# Patient Record
Sex: FEMALE | Race: WHITE | NOT HISPANIC OR LATINO | Employment: OTHER | URBAN - METROPOLITAN AREA
[De-identification: names, ages, dates, MRNs, and addresses within clinical notes are randomized per-mention and may not be internally consistent; named-entity substitution may affect disease eponyms.]

---

## 2023-03-23 PROBLEM — D64.9 ANEMIA: Status: ACTIVE | Noted: 2023-03-23

## 2023-03-23 PROBLEM — E78.00 HYPERCHOLESTEREMIA: Status: ACTIVE | Noted: 2023-03-23

## 2023-03-23 PROBLEM — K21.9 ACID REFLUX: Status: ACTIVE | Noted: 2023-03-23

## 2023-03-23 PROBLEM — F33.9 MAJOR DEPRESSION, RECURRENT (CMS-HCC): Status: ACTIVE | Noted: 2023-03-23

## 2023-03-23 PROBLEM — I10 HYPERTENSION: Status: ACTIVE | Noted: 2023-03-23

## 2023-03-23 PROBLEM — G47.30 SEVERE SLEEP APNEA: Status: ACTIVE | Noted: 2023-03-23

## 2023-03-23 PROBLEM — M85.80 OSTEOPENIA: Status: ACTIVE | Noted: 2023-03-23

## 2023-03-23 PROBLEM — E11.22: Status: ACTIVE | Noted: 2023-03-23

## 2023-03-23 PROBLEM — N18.4 STAGE 4 CHRONIC KIDNEY DISEASE (MULTI): Status: ACTIVE | Noted: 2023-03-23

## 2023-03-23 PROBLEM — E66.812 CLASS 2 SEVERE OBESITY WITH SERIOUS COMORBIDITY AND BODY MASS INDEX (BMI) OF 38.0 TO 38.9 IN ADULT: Status: ACTIVE | Noted: 2023-03-23

## 2023-03-23 PROBLEM — E83.42 HYPOMAGNESEMIA: Status: ACTIVE | Noted: 2023-03-23

## 2023-03-23 PROBLEM — E66.01 CLASS 2 SEVERE OBESITY WITH SERIOUS COMORBIDITY AND BODY MASS INDEX (BMI) OF 38.0 TO 38.9 IN ADULT (MULTI): Status: ACTIVE | Noted: 2023-03-23

## 2023-03-23 RX ORDER — MULTIVITAMIN
TABLET ORAL
COMMUNITY

## 2023-03-23 RX ORDER — NIFEDIPINE 60 MG/1
1 TABLET, FILM COATED, EXTENDED RELEASE ORAL DAILY
COMMUNITY
Start: 2021-09-14 | End: 2024-03-20 | Stop reason: SDUPTHER

## 2023-03-23 RX ORDER — LANOLIN ALCOHOL/MO/W.PET/CERES
1 CREAM (GRAM) TOPICAL DAILY
COMMUNITY

## 2023-03-23 RX ORDER — SERTRALINE HYDROCHLORIDE 50 MG/1
1 TABLET, FILM COATED ORAL DAILY
COMMUNITY
Start: 2020-12-07 | End: 2023-04-11

## 2023-03-23 RX ORDER — OMEPRAZOLE 40 MG/1
1 CAPSULE, DELAYED RELEASE ORAL 2 TIMES DAILY
COMMUNITY
Start: 2020-12-07 | End: 2023-04-11

## 2023-03-23 RX ORDER — LANCETS
EACH MISCELLANEOUS DAILY
COMMUNITY
End: 2024-01-02

## 2023-03-23 RX ORDER — LISINOPRIL 40 MG/1
1 TABLET ORAL DAILY
COMMUNITY
Start: 2021-04-22 | End: 2023-08-17 | Stop reason: SDUPTHER

## 2023-03-23 RX ORDER — ASPIRIN 81 MG/1
1 TABLET ORAL DAILY
COMMUNITY
End: 2024-02-15 | Stop reason: ALTCHOICE

## 2023-03-23 RX ORDER — PRAVASTATIN SODIUM 20 MG/1
20 TABLET ORAL DAILY
COMMUNITY
End: 2023-05-18

## 2023-03-23 RX ORDER — IBUPROFEN 200 MG
CAPSULE ORAL
COMMUNITY
End: 2024-01-02

## 2023-03-24 ENCOUNTER — LAB (OUTPATIENT)
Dept: LAB | Facility: LAB | Age: 78
End: 2023-03-24
Payer: MEDICARE

## 2023-03-24 ENCOUNTER — TELEPHONE (OUTPATIENT)
Dept: PRIMARY CARE | Facility: CLINIC | Age: 78
End: 2023-03-24

## 2023-03-24 ENCOUNTER — OFFICE VISIT (OUTPATIENT)
Dept: PRIMARY CARE | Facility: CLINIC | Age: 78
End: 2023-03-24
Payer: MEDICARE

## 2023-03-24 VITALS
SYSTOLIC BLOOD PRESSURE: 130 MMHG | BODY MASS INDEX: 37.38 KG/M2 | HEART RATE: 72 BPM | WEIGHT: 198 LBS | DIASTOLIC BLOOD PRESSURE: 80 MMHG | HEIGHT: 61 IN

## 2023-03-24 DIAGNOSIS — R82.90 ABNORMAL FINDING ON URINALYSIS: Primary | ICD-10-CM

## 2023-03-24 DIAGNOSIS — R10.84 GENERALIZED ABDOMINAL PAIN: ICD-10-CM

## 2023-03-24 DIAGNOSIS — N18.4 TYPE 2 DIABETES MELLITUS WITH STAGE 4 CHRONIC KIDNEY DISEASE, WITHOUT LONG-TERM CURRENT USE OF INSULIN (MULTI): ICD-10-CM

## 2023-03-24 DIAGNOSIS — F33.0 MILD EPISODE OF RECURRENT MAJOR DEPRESSIVE DISORDER (CMS-HCC): ICD-10-CM

## 2023-03-24 DIAGNOSIS — K59.09 OTHER CONSTIPATION: ICD-10-CM

## 2023-03-24 DIAGNOSIS — E11.22 TYPE 2 DIABETES MELLITUS WITH STAGE 4 CHRONIC KIDNEY DISEASE, WITHOUT LONG-TERM CURRENT USE OF INSULIN (MULTI): ICD-10-CM

## 2023-03-24 DIAGNOSIS — E66.1 CLASS 2 DRUG-INDUCED OBESITY WITH SERIOUS COMORBIDITY AND BODY MASS INDEX (BMI) OF 37.0 TO 37.9 IN ADULT: Primary | ICD-10-CM

## 2023-03-24 DIAGNOSIS — N18.4 STAGE 4 CHRONIC KIDNEY DISEASE (MULTI): ICD-10-CM

## 2023-03-24 PROBLEM — E66.812 CLASS 2 SEVERE OBESITY WITH SERIOUS COMORBIDITY AND BODY MASS INDEX (BMI) OF 38.0 TO 38.9 IN ADULT: Status: RESOLVED | Noted: 2023-03-23 | Resolved: 2023-03-24

## 2023-03-24 PROBLEM — E66.01 CLASS 2 SEVERE OBESITY WITH SERIOUS COMORBIDITY AND BODY MASS INDEX (BMI) OF 38.0 TO 38.9 IN ADULT (MULTI): Status: RESOLVED | Noted: 2023-03-23 | Resolved: 2023-03-24

## 2023-03-24 LAB
ALANINE AMINOTRANSFERASE (SGPT) (U/L) IN SER/PLAS: 9 U/L (ref 7–45)
ALBUMIN (G/DL) IN SER/PLAS: 4.3 G/DL (ref 3.4–5)
ALKALINE PHOSPHATASE (U/L) IN SER/PLAS: 84 U/L (ref 33–136)
ANION GAP IN SER/PLAS: 14 MMOL/L (ref 10–20)
ASPARTATE AMINOTRANSFERASE (SGOT) (U/L) IN SER/PLAS: 15 U/L (ref 9–39)
BACTERIA, URINE: ABNORMAL /HPF
BASOPHILS (10*3/UL) IN BLOOD BY AUTOMATED COUNT: 0.05 X10E9/L (ref 0–0.1)
BASOPHILS/100 LEUKOCYTES IN BLOOD BY AUTOMATED COUNT: 0.4 % (ref 0–2)
BILIRUBIN TOTAL (MG/DL) IN SER/PLAS: 0.7 MG/DL (ref 0–1.2)
CALCIUM (MG/DL) IN SER/PLAS: 9.3 MG/DL (ref 8.6–10.3)
CARBON DIOXIDE, TOTAL (MMOL/L) IN SER/PLAS: 24 MMOL/L (ref 21–32)
CHLORIDE (MMOL/L) IN SER/PLAS: 104 MMOL/L (ref 98–107)
CREATININE (MG/DL) IN SER/PLAS: 1.6 MG/DL (ref 0.5–1.05)
EOSINOPHILS (10*3/UL) IN BLOOD BY AUTOMATED COUNT: 0.13 X10E9/L (ref 0–0.4)
EOSINOPHILS/100 LEUKOCYTES IN BLOOD BY AUTOMATED COUNT: 1 % (ref 0–6)
ERYTHROCYTE DISTRIBUTION WIDTH (RATIO) BY AUTOMATED COUNT: 13.7 % (ref 11.5–14.5)
ERYTHROCYTE MEAN CORPUSCULAR HEMOGLOBIN CONCENTRATION (G/DL) BY AUTOMATED: 31.2 G/DL (ref 32–36)
ERYTHROCYTE MEAN CORPUSCULAR VOLUME (FL) BY AUTOMATED COUNT: 91 FL (ref 80–100)
ERYTHROCYTES (10*6/UL) IN BLOOD BY AUTOMATED COUNT: 4.18 X10E12/L (ref 4–5.2)
GFR FEMALE: 33 ML/MIN/1.73M2
GLUCOSE (MG/DL) IN SER/PLAS: 110 MG/DL (ref 74–99)
HEMATOCRIT (%) IN BLOOD BY AUTOMATED COUNT: 38.2 % (ref 36–46)
HEMOGLOBIN (G/DL) IN BLOOD: 11.9 G/DL (ref 12–16)
IMMATURE GRANULOCYTES/100 LEUKOCYTES IN BLOOD BY AUTOMATED COUNT: 0.5 % (ref 0–0.9)
LEUKOCYTES (10*3/UL) IN BLOOD BY AUTOMATED COUNT: 13.1 X10E9/L (ref 4.4–11.3)
LYMPHOCYTES (10*3/UL) IN BLOOD BY AUTOMATED COUNT: 2.17 X10E9/L (ref 0.8–3)
LYMPHOCYTES/100 LEUKOCYTES IN BLOOD BY AUTOMATED COUNT: 16.5 % (ref 13–44)
MAGNESIUM (MG/DL) IN SER/PLAS: 1.85 MG/DL (ref 1.6–2.4)
MONOCYTES (10*3/UL) IN BLOOD BY AUTOMATED COUNT: 1.3 X10E9/L (ref 0.05–0.8)
MONOCYTES/100 LEUKOCYTES IN BLOOD BY AUTOMATED COUNT: 9.9 % (ref 2–10)
MUCUS, URINE: ABNORMAL /LPF
NEUTROPHILS (10*3/UL) IN BLOOD BY AUTOMATED COUNT: 9.41 X10E9/L (ref 1.6–5.5)
NEUTROPHILS/100 LEUKOCYTES IN BLOOD BY AUTOMATED COUNT: 71.7 % (ref 40–80)
PLATELETS (10*3/UL) IN BLOOD AUTOMATED COUNT: 396 X10E9/L (ref 150–450)
POTASSIUM (MMOL/L) IN SER/PLAS: 4.3 MMOL/L (ref 3.5–5.3)
PROTEIN TOTAL: 6.7 G/DL (ref 6.4–8.2)
RBC, URINE: 3 /HPF (ref 0–5)
SODIUM (MMOL/L) IN SER/PLAS: 138 MMOL/L (ref 136–145)
SQUAMOUS EPITHELIAL CELLS, URINE: 2 /HPF
UREA NITROGEN (MG/DL) IN SER/PLAS: 24 MG/DL (ref 6–23)
WBC, URINE: 15 /HPF (ref 0–5)

## 2023-03-24 PROCEDURE — 36415 COLL VENOUS BLD VENIPUNCTURE: CPT

## 2023-03-24 PROCEDURE — 1159F MED LIST DOCD IN RCRD: CPT | Performed by: FAMILY MEDICINE

## 2023-03-24 PROCEDURE — 1036F TOBACCO NON-USER: CPT | Performed by: FAMILY MEDICINE

## 2023-03-24 PROCEDURE — 81001 URINALYSIS AUTO W/SCOPE: CPT

## 2023-03-24 PROCEDURE — 99214 OFFICE O/P EST MOD 30 MIN: CPT | Performed by: FAMILY MEDICINE

## 2023-03-24 PROCEDURE — 80053 COMPREHEN METABOLIC PANEL: CPT

## 2023-03-24 PROCEDURE — 3075F SYST BP GE 130 - 139MM HG: CPT | Performed by: FAMILY MEDICINE

## 2023-03-24 PROCEDURE — 83735 ASSAY OF MAGNESIUM: CPT

## 2023-03-24 PROCEDURE — 85025 COMPLETE CBC W/AUTO DIFF WBC: CPT

## 2023-03-24 PROCEDURE — 3079F DIAST BP 80-89 MM HG: CPT | Performed by: FAMILY MEDICINE

## 2023-03-24 PROCEDURE — 1160F RVW MEDS BY RX/DR IN RCRD: CPT | Performed by: FAMILY MEDICINE

## 2023-03-24 ASSESSMENT — ENCOUNTER SYMPTOMS
CONSTIPATION: 1
ABDOMINAL PAIN: 1

## 2023-03-24 NOTE — TELEPHONE ENCOUNTER
----- Message from Lindsey Engladn MD sent at 3/24/2023  1:13 PM EDT -----  Can you please see if lab can add a urine culture tp labs done this morning, I'll put the order in

## 2023-03-24 NOTE — PROGRESS NOTES
Subjective  Georgie Friedman is a 77 y.o. female who presents for Abdominal Pain, Constipation, and Chills.  Abdominal Pain  Associated symptoms include constipation.   Constipation  Associated symptoms include abdominal pain.     Pt presents with about two weeks of abdominal cramping, and at first diarrhea.   The diarrhea stopped after a few days, and then became constipated.   Using otc laxatives did help.   Passing gas.  Last BM was two days ago.  Having abdominal pain,, but has improved.  Pain is diffuse.  Appetite was diminished.  Has not eaten today.  She is mildly nauseous.   No blood in stool, no dark tarry stool.    History of diverticulitis, 2017 was hospitalized two weeks.  History of hypomagnesium, she had at some point stopped taking magnesium awhile ago.   No fevers, has had chills.  Miralax and dulcolax helped, she hasn't taken any for two days.   Pleasant.  Poor historian .  Has apt with GI.  Reviewed two pages she brought with her concerns and what she has tried, will scan into record.  BMI 37, history of stage 4 kidney disease, diabetes, have been stable.  Review of Systems   Gastrointestinal:  Positive for abdominal pain and constipation.   All other systems reviewed and are negative.  .    Objective     Visit Vitals  /80 (BP Location: Left arm, Patient Position: Sitting)   Pulse 72      Physical Exam  Vitals and nursing note reviewed.   Constitutional:       General: She is not in acute distress.     Appearance: Normal appearance. She is not toxic-appearing.   HENT:      Head: Normocephalic and atraumatic.   Eyes:      General: No scleral icterus.  Cardiovascular:      Rate and Rhythm: Normal rate and regular rhythm.      Heart sounds: No murmur heard.  Pulmonary:      Effort: Pulmonary effort is normal.      Breath sounds: Normal breath sounds.   Abdominal:      General: There is no distension.      Palpations: Abdomen is soft. There is no mass.      Tenderness: There is no  abdominal tenderness. There is no guarding or rebound.   Musculoskeletal:      Cervical back: Neck supple. No rigidity.      Comments:     Skin:     General: Skin is warm and dry.   Neurological:      General: No focal deficit present.      Mental Status: She is alert and oriented to person, place, and time.   Psychiatric:         Mood and Affect: Mood normal.         Behavior: Behavior normal.         Assessment/Plan   Problem List Items Addressed This Visit          Genitourinary    Stage 4 chronic kidney disease (CMS/HCC)       Endocrine/Metabolic    Diabetes mellitus with chronic kidney disease, without long-term current use of insulin (CMS/HCC)       Other    Major depression, recurrent (CMS/HCC)     Other Visit Diagnoses       Class 2 drug-induced obesity with serious comorbidity and body mass index (BMI) of 37.0 to 37.9 in adult    -  Primary    Generalized abdominal pain        Relevant Orders    XR abdomen 2 views w chest 1 view    CBC and Auto Differential    Comprehensive Metabolic Panel    Magnesium    Urinalysis Microscopic Only    Other constipation                       Lindsey England MD

## 2023-03-26 LAB — URINE CULTURE: NORMAL

## 2023-03-28 ENCOUNTER — TELEPHONE (OUTPATIENT)
Dept: PRIMARY CARE | Facility: CLINIC | Age: 78
End: 2023-03-28
Payer: MEDICARE

## 2023-03-28 NOTE — RESULT ENCOUNTER NOTE
Final urine culture was negative, if her symptoms do not resolve, recommend she make a followup appt for further evaluation.

## 2023-03-28 NOTE — TELEPHONE ENCOUNTER
----- Message from Lindsey England MD sent at 3/28/2023  7:51 AM EDT -----  Final urine culture was negative, if her symptoms do not resolve, recommend she make a followup appt for further evaluation.

## 2023-03-28 NOTE — TELEPHONE ENCOUNTER
"States she is feeling better but still not good. Still having RT ABD pain; states feels like her \"insides are swollen\". States [facility] told her that her WBC was elevated which meant she had some kind of infection; requesting ATB. Please advise, thanks.  "

## 2023-03-29 ENCOUNTER — APPOINTMENT (OUTPATIENT)
Dept: PRIMARY CARE | Facility: CLINIC | Age: 78
End: 2023-03-29
Payer: MEDICARE

## 2023-03-30 ENCOUNTER — OFFICE VISIT (OUTPATIENT)
Dept: PRIMARY CARE | Facility: CLINIC | Age: 78
End: 2023-03-30
Payer: MEDICARE

## 2023-03-30 VITALS
DIASTOLIC BLOOD PRESSURE: 70 MMHG | HEIGHT: 61 IN | SYSTOLIC BLOOD PRESSURE: 124 MMHG | WEIGHT: 196.2 LBS | HEART RATE: 80 BPM | BODY MASS INDEX: 37.04 KG/M2

## 2023-03-30 DIAGNOSIS — K59.00 CONSTIPATION IN FEMALE: Primary | ICD-10-CM

## 2023-03-30 PROCEDURE — 3074F SYST BP LT 130 MM HG: CPT | Performed by: FAMILY MEDICINE

## 2023-03-30 PROCEDURE — 99213 OFFICE O/P EST LOW 20 MIN: CPT | Performed by: FAMILY MEDICINE

## 2023-03-30 PROCEDURE — 1160F RVW MEDS BY RX/DR IN RCRD: CPT | Performed by: FAMILY MEDICINE

## 2023-03-30 PROCEDURE — 1159F MED LIST DOCD IN RCRD: CPT | Performed by: FAMILY MEDICINE

## 2023-03-30 PROCEDURE — 3078F DIAST BP <80 MM HG: CPT | Performed by: FAMILY MEDICINE

## 2023-03-30 PROCEDURE — 1036F TOBACCO NON-USER: CPT | Performed by: FAMILY MEDICINE

## 2023-03-30 ASSESSMENT — ENCOUNTER SYMPTOMS
ABDOMINAL PAIN: 1
FATIGUE: 1
CONSTIPATION: 1

## 2023-03-30 NOTE — PATIENT INSTRUCTIONS
We discussed colace 100 mg daily.  Miralax as directed on the bottle daily or every other day. If needed occasional dose of milk of magnesia.  If not improving,/pain consider ct scan. Call concerns.

## 2023-03-30 NOTE — PROGRESS NOTES
Subjective  Georgie Friedman is a 77 y.o. female who presents for Abdominal Pain, Constipation, and Fatigue.  Abdominal Pain  Associated symptoms include constipation.   Constipation  Associated symptoms include abdominal pain.   Fatigue  Associated symptoms include abdominal pain and fatigue.     Georgie presents for followup, see previous note.  State her main problem now is constipation  she had an acute illness that every one at work had, with diarrhea, cramping, that has resolved.  Now constipated, went small amount this morning . Feels bloated, no vomiting. Tried colace and miralax once, helped some.  No blood, no dark tarry stools.  Reviewed recent results with her.   Review of Systems   Constitutional:  Positive for fatigue.   Gastrointestinal:  Positive for abdominal pain and constipation.   All other systems reviewed and are negative.  .    Objective     Visit Vitals  /70 (BP Location: Left arm, Patient Position: Sitting)   Pulse 80      Physical Exam  Constitutional:       Appearance: Normal appearance.   Pulmonary:      Effort: Pulmonary effort is normal.   Abdominal:      General: Abdomen is flat. There is distension.      Palpations: Abdomen is soft.      Tenderness: There is no abdominal tenderness. There is no guarding or rebound.   Neurological:      Mental Status: She is alert.         Assessment/Plan   Problem List Items Addressed This Visit    None  Visit Diagnoses       Constipation in female    -  Primary                   Lindsey England MD

## 2023-04-10 DIAGNOSIS — K21.9 GASTROESOPHAGEAL REFLUX DISEASE, UNSPECIFIED WHETHER ESOPHAGITIS PRESENT: ICD-10-CM

## 2023-04-10 DIAGNOSIS — F33.9 RECURRENT MAJOR DEPRESSIVE DISORDER, REMISSION STATUS UNSPECIFIED (CMS-HCC): ICD-10-CM

## 2023-04-11 RX ORDER — SERTRALINE HYDROCHLORIDE 50 MG/1
TABLET, FILM COATED ORAL
Qty: 90 TABLET | Refills: 3 | Status: SHIPPED | OUTPATIENT
Start: 2023-04-11 | End: 2023-08-17 | Stop reason: SDUPTHER

## 2023-04-11 RX ORDER — OMEPRAZOLE 40 MG/1
CAPSULE, DELAYED RELEASE ORAL
Qty: 180 CAPSULE | Refills: 3 | Status: SHIPPED | OUTPATIENT
Start: 2023-04-11 | End: 2023-08-17 | Stop reason: SDUPTHER

## 2023-05-18 DIAGNOSIS — E11.22 TYPE 2 DIABETES MELLITUS WITH DIABETIC CHRONIC KIDNEY DISEASE (MULTI): ICD-10-CM

## 2023-05-18 LAB
APPEARANCE, URINE: ABNORMAL
BILIRUBIN, URINE: NEGATIVE
BLOOD, URINE: NEGATIVE
COLOR, URINE: YELLOW
GLUCOSE, URINE: NEGATIVE MG/DL
KETONES, URINE: NEGATIVE MG/DL
LEUKOCYTE ESTERASE, URINE: ABNORMAL
MUCUS, URINE: ABNORMAL /LPF
NITRITE, URINE: NEGATIVE
PH, URINE: 5 (ref 5–8)
PROTEIN, URINE: NEGATIVE MG/DL
RBC, URINE: ABNORMAL /HPF (ref 0–5)
SPECIFIC GRAVITY, URINE: 1.01 (ref 1–1.03)
SQUAMOUS EPITHELIAL CELLS, URINE: 3 /HPF
UROBILINOGEN, URINE: <2 MG/DL (ref 0–1.9)
WBC, URINE: 55 /HPF (ref 0–5)

## 2023-05-18 RX ORDER — PRAVASTATIN SODIUM 20 MG/1
TABLET ORAL
Qty: 90 TABLET | Refills: 1 | Status: SHIPPED | OUTPATIENT
Start: 2023-05-18 | End: 2023-08-17 | Stop reason: SDUPTHER

## 2023-05-27 LAB — URINE CULTURE: ABNORMAL

## 2023-08-10 LAB
ANION GAP IN SER/PLAS: 13 MMOL/L (ref 10–20)
CALCIUM (MG/DL) IN SER/PLAS: 9.4 MG/DL (ref 8.6–10.3)
CARBON DIOXIDE, TOTAL (MMOL/L) IN SER/PLAS: 25 MMOL/L (ref 21–32)
CHLORIDE (MMOL/L) IN SER/PLAS: 106 MMOL/L (ref 98–107)
CREATININE (MG/DL) IN SER/PLAS: 1.7 MG/DL (ref 0.5–1.05)
ESTIMATED AVERAGE GLUCOSE FOR HBA1C: 131 MG/DL
GFR FEMALE: 31 ML/MIN/1.73M2
GLUCOSE (MG/DL) IN SER/PLAS: 103 MG/DL (ref 74–99)
HEMOGLOBIN A1C/HEMOGLOBIN TOTAL IN BLOOD: 6.2 %
POTASSIUM (MMOL/L) IN SER/PLAS: 4.5 MMOL/L (ref 3.5–5.3)
SODIUM (MMOL/L) IN SER/PLAS: 139 MMOL/L (ref 136–145)
UREA NITROGEN (MG/DL) IN SER/PLAS: 30 MG/DL (ref 6–23)

## 2023-08-17 ENCOUNTER — OFFICE VISIT (OUTPATIENT)
Dept: PRIMARY CARE | Facility: CLINIC | Age: 78
End: 2023-08-17
Payer: MEDICARE

## 2023-08-17 VITALS
HEART RATE: 68 BPM | WEIGHT: 196.38 LBS | SYSTOLIC BLOOD PRESSURE: 132 MMHG | DIASTOLIC BLOOD PRESSURE: 80 MMHG | BODY MASS INDEX: 37.08 KG/M2 | HEIGHT: 61 IN

## 2023-08-17 DIAGNOSIS — Z23 NEED FOR PNEUMOCOCCAL VACCINATION: Primary | ICD-10-CM

## 2023-08-17 DIAGNOSIS — E11.22 TYPE 2 DIABETES MELLITUS WITH DIABETIC CHRONIC KIDNEY DISEASE (MULTI): ICD-10-CM

## 2023-08-17 DIAGNOSIS — K21.9 GASTROESOPHAGEAL REFLUX DISEASE, UNSPECIFIED WHETHER ESOPHAGITIS PRESENT: ICD-10-CM

## 2023-08-17 DIAGNOSIS — N18.4 TYPE 2 DIABETES MELLITUS WITH STAGE 4 CHRONIC KIDNEY DISEASE, WITHOUT LONG-TERM CURRENT USE OF INSULIN (MULTI): ICD-10-CM

## 2023-08-17 DIAGNOSIS — E11.22 TYPE 2 DIABETES MELLITUS WITH STAGE 4 CHRONIC KIDNEY DISEASE, WITHOUT LONG-TERM CURRENT USE OF INSULIN (MULTI): ICD-10-CM

## 2023-08-17 DIAGNOSIS — E78.49 OTHER HYPERLIPIDEMIA: ICD-10-CM

## 2023-08-17 DIAGNOSIS — I10 HYPERTENSION, UNSPECIFIED TYPE: ICD-10-CM

## 2023-08-17 DIAGNOSIS — I10 PRIMARY HYPERTENSION: ICD-10-CM

## 2023-08-17 DIAGNOSIS — F33.9 RECURRENT MAJOR DEPRESSIVE DISORDER, REMISSION STATUS UNSPECIFIED (CMS-HCC): ICD-10-CM

## 2023-08-17 PROBLEM — E78.00 HYPERCHOLESTEREMIA: Status: RESOLVED | Noted: 2023-03-23 | Resolved: 2023-08-17

## 2023-08-17 PROCEDURE — 1036F TOBACCO NON-USER: CPT | Performed by: FAMILY MEDICINE

## 2023-08-17 PROCEDURE — G0009 ADMIN PNEUMOCOCCAL VACCINE: HCPCS | Performed by: FAMILY MEDICINE

## 2023-08-17 PROCEDURE — 3079F DIAST BP 80-89 MM HG: CPT | Performed by: FAMILY MEDICINE

## 2023-08-17 PROCEDURE — 99214 OFFICE O/P EST MOD 30 MIN: CPT | Performed by: FAMILY MEDICINE

## 2023-08-17 PROCEDURE — 3075F SYST BP GE 130 - 139MM HG: CPT | Performed by: FAMILY MEDICINE

## 2023-08-17 PROCEDURE — 1159F MED LIST DOCD IN RCRD: CPT | Performed by: FAMILY MEDICINE

## 2023-08-17 PROCEDURE — 90677 PCV20 VACCINE IM: CPT | Performed by: FAMILY MEDICINE

## 2023-08-17 PROCEDURE — 1160F RVW MEDS BY RX/DR IN RCRD: CPT | Performed by: FAMILY MEDICINE

## 2023-08-17 RX ORDER — LISINOPRIL 40 MG/1
40 TABLET ORAL DAILY
Qty: 90 TABLET | Refills: 3 | Status: SHIPPED | OUTPATIENT
Start: 2023-08-17 | End: 2024-01-02

## 2023-08-17 RX ORDER — SERTRALINE HYDROCHLORIDE 50 MG/1
50 TABLET, FILM COATED ORAL DAILY
Qty: 90 TABLET | Refills: 3 | Status: SHIPPED | OUTPATIENT
Start: 2023-08-17 | End: 2024-01-02

## 2023-08-17 RX ORDER — CALCIUM CARBONATE 300MG(750)
400 TABLET,CHEWABLE ORAL DAILY
COMMUNITY

## 2023-08-17 RX ORDER — CALCIUM CARBONATE/VITAMIN D3 500 MG-10
1 TABLET,CHEWABLE ORAL DAILY
COMMUNITY

## 2023-08-17 RX ORDER — OMEPRAZOLE 40 MG/1
40 CAPSULE, DELAYED RELEASE ORAL 2 TIMES DAILY
Qty: 180 CAPSULE | Refills: 3 | Status: SHIPPED | OUTPATIENT
Start: 2023-08-17 | End: 2024-01-02

## 2023-08-17 RX ORDER — PRAVASTATIN SODIUM 20 MG/1
20 TABLET ORAL DAILY
Qty: 90 TABLET | Refills: 3 | Status: SHIPPED | OUTPATIENT
Start: 2023-08-17 | End: 2024-02-20 | Stop reason: ALTCHOICE

## 2023-08-17 NOTE — PROGRESS NOTES
Patient presents for periodic surveillance of chronic medical problems.     Subjective  Georgie Friedman is a 77 y.o. female who presents for Annual Exam.  HPI  Dm, diet controlled, A1c6.2  Stage 4 kidney disease with anemia, sees nephrology, appreciate their consults  Depression, stable on current regimen, wishes to continue  Sleep apnea, compliant with cpap  She had pneumonia shot in 2020 in [place], no clear which one, discussed Prevnar 20 and we opted to update this and then we're sure she's up to date, she's agreeable.    Review of Systems   All other systems reviewed and are negative.  .    Objective     Visit Vitals  /80 (BP Location: Left arm, Patient Position: Sitting)   Pulse 68      Physical Exam  Vitals and nursing note reviewed.   Constitutional:       General: She is not in acute distress.     Appearance: Normal appearance. She is not toxic-appearing.   HENT:      Head: Normocephalic and atraumatic.   Cardiovascular:      Rate and Rhythm: Normal rate and regular rhythm.   Pulmonary:      Effort: Pulmonary effort is normal.      Breath sounds: Normal breath sounds.   Musculoskeletal:      Comments:     Skin:     General: Skin is warm and dry.   Neurological:      General: No focal deficit present.      Mental Status: She is alert and oriented to person, place, and time.   Psychiatric:         Mood and Affect: Mood normal.         Behavior: Behavior normal.         Assessment/Plan   Problem List Items Addressed This Visit     Diabetes mellitus with chronic kidney disease, without long-term current use of insulin (CMS/[facility])    Relevant Orders    Follow Up In Primary Care - Established    Hemoglobin A1C    Lipid Panel    TSH with reflex to Free T4 if abnormal    Urinalysis Microscopic Only    Albumin , Urine Random    Vitamin B12    Other hyperlipidemia    Hypertension   Other Visit Diagnoses     Need for pneumococcal vaccination    -  Primary    Relevant Orders    Pneumococcal conjugate vaccine,  20-valent, adult (PREVNAR 20) (Completed)                 Lindsey England MD Patient was identified as a fall risk. Risk prevention instructions provided.  Patient was identified as a fall risk. Risk prevention instructions provided.

## 2023-08-17 NOTE — PATIENT INSTRUCTIONS

## 2023-09-19 ENCOUNTER — OFFICE VISIT (OUTPATIENT)
Dept: PRIMARY CARE | Facility: CLINIC | Age: 78
End: 2023-09-19
Payer: MEDICARE

## 2023-09-19 VITALS
SYSTOLIC BLOOD PRESSURE: 132 MMHG | WEIGHT: 199.2 LBS | HEIGHT: 61 IN | BODY MASS INDEX: 37.61 KG/M2 | DIASTOLIC BLOOD PRESSURE: 78 MMHG | HEART RATE: 88 BPM

## 2023-09-19 DIAGNOSIS — G89.29 CHRONIC PAIN OF LEFT KNEE: Primary | ICD-10-CM

## 2023-09-19 DIAGNOSIS — Z23 NEED FOR INFLUENZA VACCINATION: ICD-10-CM

## 2023-09-19 DIAGNOSIS — M25.562 CHRONIC PAIN OF LEFT KNEE: Primary | ICD-10-CM

## 2023-09-19 PROCEDURE — G0008 ADMIN INFLUENZA VIRUS VAC: HCPCS | Performed by: FAMILY MEDICINE

## 2023-09-19 PROCEDURE — 1159F MED LIST DOCD IN RCRD: CPT | Performed by: FAMILY MEDICINE

## 2023-09-19 PROCEDURE — 1036F TOBACCO NON-USER: CPT | Performed by: FAMILY MEDICINE

## 2023-09-19 PROCEDURE — 90662 IIV NO PRSV INCREASED AG IM: CPT | Performed by: FAMILY MEDICINE

## 2023-09-19 PROCEDURE — 3078F DIAST BP <80 MM HG: CPT | Performed by: FAMILY MEDICINE

## 2023-09-19 PROCEDURE — 1160F RVW MEDS BY RX/DR IN RCRD: CPT | Performed by: FAMILY MEDICINE

## 2023-09-19 PROCEDURE — 3075F SYST BP GE 130 - 139MM HG: CPT | Performed by: FAMILY MEDICINE

## 2023-09-19 PROCEDURE — 99213 OFFICE O/P EST LOW 20 MIN: CPT | Performed by: FAMILY MEDICINE

## 2023-09-19 NOTE — LETTER
September 19, 2023     Patient: Georgie Friedman   YOB: 1945   Date of Visit: 9/19/2023       To Whom It May Concern:    Georgie Friedman was seen in my clinic on 9/19/2023 at 1:20 pm. Please excuse due to medication condition 9/18 to 9/22/23 .  May return 9/25/23.  If you have any questions or concerns, please don't hesitate to call.         Sincerely,         Lindsey England MD        CC: No Recipients

## 2023-09-19 NOTE — PROGRESS NOTES
Patient presents for periodic surveillance of chronic medical problems.     Subjective  Georgie Friedman is a 77 y.o. female who presents for Knee Pain.  HPI  Pt presents with left knee pain and swelling since Sunday.  She was unable to walk for a bit Sunday.  She had a tkr right 16 years ago, and was told at that time the left needed done when she was ready.  She's ready.  No calf pain or tenderness.  Taking tylenol and satisfied with that.  Requests note for work, and high dose influenza vaccine today. Disability placard.   Review of Systems   All other systems reviewed and are negative.  .    Objective     Visit Vitals  /78 (BP Location: Left arm, Patient Position: Sitting)   Pulse 88      Physical Exam  Musculoskeletal:      Comments: Left knee FROM mild effusion, not red, not warm   Neurological:      Mental Status: She is alert.         Assessment/Plan   Problem List Items Addressed This Visit    None  Visit Diagnoses       Chronic pain of left knee    -  Primary    Relevant Orders    Referral to Orthopaedic Surgery    XR knee left 4+ views    Disability Placard    Need for influenza vaccination        Relevant Orders    Flu vaccine, quadrivalent, high-dose, preservative free, age 65y+ (FLUZONE)                   Lindsey England MD

## 2023-10-05 ENCOUNTER — OFFICE VISIT (OUTPATIENT)
Dept: ORTHOPEDIC SURGERY | Facility: CLINIC | Age: 78
End: 2023-10-05
Payer: MEDICARE

## 2023-10-05 DIAGNOSIS — M25.562 CHRONIC PAIN OF LEFT KNEE: Primary | ICD-10-CM

## 2023-10-05 DIAGNOSIS — G89.29 CHRONIC PAIN OF LEFT KNEE: Primary | ICD-10-CM

## 2023-10-05 DIAGNOSIS — Z01.89 ENCOUNTER FOR OTHER SPECIFIED SPECIAL EXAMINATIONS: ICD-10-CM

## 2023-10-05 PROCEDURE — 1160F RVW MEDS BY RX/DR IN RCRD: CPT | Performed by: SPECIALIST

## 2023-10-05 PROCEDURE — 1125F AMNT PAIN NOTED PAIN PRSNT: CPT | Performed by: SPECIALIST

## 2023-10-05 PROCEDURE — 99214 OFFICE O/P EST MOD 30 MIN: CPT | Performed by: SPECIALIST

## 2023-10-05 PROCEDURE — 1036F TOBACCO NON-USER: CPT | Performed by: SPECIALIST

## 2023-10-05 PROCEDURE — 1159F MED LIST DOCD IN RCRD: CPT | Performed by: SPECIALIST

## 2023-10-05 ASSESSMENT — PAIN - FUNCTIONAL ASSESSMENT: PAIN_FUNCTIONAL_ASSESSMENT: 0-10

## 2023-10-05 ASSESSMENT — PAIN DESCRIPTION - DESCRIPTORS: DESCRIPTORS: BURNING;TIGHTNESS

## 2023-10-05 ASSESSMENT — PAIN SCALES - GENERAL: PAINLEVEL_OUTOF10: 6

## 2023-10-05 NOTE — LETTER
October 6, 2023     Lindsey England MD  [address]  [facility] [place] [address]    Patient: Georgie Friedman   YOB: 1945   Date of Visit: 10/5/2023       Dear Dr. Lindsey England MD:    Thank you for referring Georgie Friedman to me for evaluation. Below are my notes for this consultation.  If you have questions, please do not hesitate to call me. I look forward to following your patient along with you.       Sincerely,     Phil Minor MD      CC: No Recipients  ______________________________________________________________________________________    Subjective   Patient ID: Georgie Friedman is a 77 y.o. female.    Chief Complaint: Pain of the Left Knee     Last Surgery: No surgery found  Last Surgery Date: No surgery found    Left Knee       77-year-old female who comes in today for evaluation of her left knee pain.  She had x-rays which were obtained on 9/25/2023 and show tricompartmental arthritis with hypertrophic osteophytes and some varus angulation and medial joint space narrowing.  She was interested in exploring treatment options especially nonsurgical.  Objective  Ortho Exam    Left knee Exam    Appears healthy and in no acute distress.     Examination of the knee reveals the following:    Inspection: In the standing position, knee alignment is normal.   There is no muscle atrophy around the knee.   Walking gait is slightly antalgic.     In the supine position, there is no obvious leg length discrepancy or difference in alignment.     Palpation:  No calor- warmth  No tumor- soft tissue swelling  Minimal effusion.  No ecchymosis.    Dulor- pain:  Minimal tenderness to the lateral joint line.  Mild tenderness to the medial joint line.  Minimal pain with patellofemoral compression.    Neurovascular:  Dorsalis pedis pulse is palpable with brisk capillary refill.  Sensation is intact over the lower leg, dorsum of the foot, plantar aspect  and first web  space.   The calves are non-tender to palpation bilaterally.    Range of Motion:   Active knee extention is  0 degrees and is painless.  Active knee flexion is >125 degrees and is painless.  Active ankle range of motion is full.   Full passive internal and external rotation of the hip does not cause any pain.  Normal flexibility in the hamstrings, quadriceps and heel cords.    Strength Testing:     Hip flexors, hip extensors, abductors, adductors, knee flexors, knee extensors, ankle dorsiflexor and ankle plantar flexor strength are all 5/5.    Stability and Provocative Testing:   Medial Collateral Ligament: There is no laxity or pain with valgus stress testing at 0 and 15 degrees of knee flexion.  Lateral Collateral Ligament: There is no laxity or pain with varus stress testing at 0 and 15 degrees of knee flexion.  Anterior Cruciate Ligament: Anterior Drawer reveals a firm endpoint and no laxity.  Anterior Cruciate Ligament: Lachman's reveals a firm endpoint and no laxity.  Posterior Cruciate Ligament: There is no posterior translation of the tibia plateau.     Medial and Lateral Menisci: Jeremiah's test is negative.    Hip- Range of motion and cursory exam was non-tender and not provacative.    Image Results:  XR knee complete 4 or more views  Narrative: Interpreted By:  LEIDY HIGHTOWER MD  MRN: [id number]  Patient Name: GEORGIE MARIANO     STUDY:  KNEE; COMPLT, 4 OR MORE VIEWS;  9/25/2023 10:10 am     INDICATION:  left knee pain.     COMPARISON:  none     ACCESSION NUMBER(S):  [id number]     ORDERING CLINICIAN:  ALVERTO ALCANTAR     FINDINGS:  Severe tricompartment productive degenerative changes. No fracture or  dislocation. No osseous lesion. No effusion.     Impression: Severe left knee osteoarthrosis       Patient ID: Georgie Mariano is a 77 y.o. female.    L Inj/Asp: L knee on 10/6/2023 7:30 AM  Indications: pain  Details: ultrasound-guided lateral approach  Medications: 2.5 mg triamcinolone acetonide 40  mg/mL  Consent was given by the patient.       DIAGNOSTIC ULTRASOUND REPORT FINAL: Left KNEE  Sonographer: Phil Minor MD  Indication: Knee Pain  Procedure: Ultrasound, extremity, nonvascular, real-time, COMPLETE, anatomic specific  Technique: B-Mode Ultrasound Examination performed using 6- 9 MHz linear transducer with [employer]  STUDY TYPE:   1. ULTRASOUND EXTREMITY  2. REAL TIME WITH IMAGE DOCUMENTATION  3. NON-VASCULAR  4. COMPLETE STUDY, INCLUDING BUT NOT LIMITED TO MUSCLE, TENDONS, LIGAMENTS, SOFT TISSUES, ADIPOSE TISSUE AND SUBCUTANEOUS TISSUE.  Site: KNEE   Live ultrasound was performed with of patient's  KNEE and PERMANENTLY documented. I personally performed the ultrasound and reviewed the findings. These show:    Cass-articular evaluation:   An intact Quadriceps Tendon with the Quadriceps Muscle fibers showing normal striations Quadriceps Tendon demonstrating normal fibrillar pattern. . The Patellar Tendon demonstrates normal fibrillar pattern and is intact.   No significant soft tissue fluid collection/abscess appreciated.     Joint Evaluation:  The lateral joint line shows an intact LCL.   Medial joint line exam shows an intact MCL.   The patellar tendon was within normal limits.  Mild joint effusion noted.     The patient tolerated the procedure well.    Patient was made aware of the risks and benefits of the procedure and he consented to having a knee injection.  Under sterile conditions 40 mg of Kenalog and a mixture of 3 cc 1% lidocaine and 3 cc quarter percent Marcaine were injected into the intra-articular knee space.  Post-injection instructions were reviewed with the patient and the patient voiced understanding of these instructions.  Patient did well with this and had a excellent lidocaine suppression test.    Assessment/Plan    Arthritis of knee, left  Assessment: Left knee arthritis-tricompartmental with varus narrowing    Plan:  Ultrasound-guided Kenalog injection with 40 mg of Kenalog  and lidocaine.  Excellent lidocaine suppression test.  Continue with home exercise program.  Nonsteroidal anti-inflammatories and Voltaren were not utilized as the patient has stage IV chronic kidney disease.  Follow-up in 6 weeks for reevaluation.   Encounter Diagnoses:  Chronic pain of left knee    Encounter for other specified special examinations    Orders Placed This Encounter   • Point of Care Ultrasound     No follow-ups on file.

## 2023-10-06 PROBLEM — M17.12 ARTHRITIS OF KNEE, LEFT: Status: ACTIVE | Noted: 2023-10-06

## 2023-10-06 PROCEDURE — 20611 DRAIN/INJ JOINT/BURSA W/US: CPT | Performed by: SPECIALIST

## 2023-10-06 RX ORDER — TRIAMCINOLONE ACETONIDE 40 MG/ML
2.5 INJECTION, SUSPENSION INTRA-ARTICULAR; INTRAMUSCULAR
Status: COMPLETED | OUTPATIENT
Start: 2023-10-06 | End: 2023-10-06

## 2023-10-06 RX ADMIN — TRIAMCINOLONE ACETONIDE 2.5 MG: 40 INJECTION, SUSPENSION INTRA-ARTICULAR; INTRAMUSCULAR at 07:30

## 2023-10-06 ASSESSMENT — ENCOUNTER SYMPTOMS: KNEE DEFORMITY: 1

## 2023-10-06 NOTE — ASSESSMENT & PLAN NOTE
Assessment: Left knee arthritis-tricompartmental with varus narrowing    Plan:  Ultrasound-guided Kenalog injection with 40 mg of Kenalog and lidocaine.  Excellent lidocaine suppression test.  Continue with home exercise program.  Nonsteroidal anti-inflammatories and Voltaren were not utilized as the patient has stage IV chronic kidney disease.  Follow-up in 6 weeks for reevaluation.

## 2023-10-06 NOTE — PROGRESS NOTES
Subjective    Patient ID: Georgie Friedman is a 77 y.o. female.    Chief Complaint: Pain of the Left Knee     Last Surgery: No surgery found  Last Surgery Date: No surgery found    Left Knee       77-year-old female who comes in today for evaluation of her left knee pain.  She had x-rays which were obtained on 9/25/2023 and show tricompartmental arthritis with hypertrophic osteophytes and some varus angulation and medial joint space narrowing.  She was interested in exploring treatment options especially nonsurgical.  Objective   Ortho Exam    Left knee Exam    Appears healthy and in no acute distress.     Examination of the knee reveals the following:    Inspection: In the standing position, knee alignment is normal.   There is no muscle atrophy around the knee.   Walking gait is slightly antalgic.     In the supine position, there is no obvious leg length discrepancy or difference in alignment.     Palpation:  No calor- warmth  No tumor- soft tissue swelling  Minimal effusion.  No ecchymosis.    Dulor- pain:  Minimal tenderness to the lateral joint line.  Mild tenderness to the medial joint line.  Minimal pain with patellofemoral compression.    Neurovascular:  Dorsalis pedis pulse is palpable with brisk capillary refill.  Sensation is intact over the lower leg, dorsum of the foot, plantar aspect  and first web space.   The calves are non-tender to palpation bilaterally.    Range of Motion:   Active knee extention is  0 degrees and is painless.  Active knee flexion is >125 degrees and is painless.  Active ankle range of motion is full.   Full passive internal and external rotation of the hip does not cause any pain.  Normal flexibility in the hamstrings, quadriceps and heel cords.    Strength Testing:     Hip flexors, hip extensors, abductors, adductors, knee flexors, knee extensors, ankle dorsiflexor and ankle plantar flexor strength are all 5/5.    Stability and Provocative Testing:   Medial Collateral  Ligament: There is no laxity or pain with valgus stress testing at 0 and 15 degrees of knee flexion.  Lateral Collateral Ligament: There is no laxity or pain with varus stress testing at 0 and 15 degrees of knee flexion.  Anterior Cruciate Ligament: Anterior Drawer reveals a firm endpoint and no laxity.  Anterior Cruciate Ligament: Lachman's reveals a firm endpoint and no laxity.  Posterior Cruciate Ligament: There is no posterior translation of the tibia plateau.     Medial and Lateral Menisci: Jeremiah's test is negative.    Hip- Range of motion and cursory exam was non-tender and not provacative.    Image Results:  XR knee complete 4 or more views  Narrative: Interpreted By:  LEIDY HIGHTOWER MD  MRN: [id number]  Patient Name: GEORGIE MARIANO     STUDY:  KNEE; COMPLT, 4 OR MORE VIEWS;  9/25/2023 10:10 am     INDICATION:  left knee pain.     COMPARISON:  none     ACCESSION NUMBER(S):  [id number]     ORDERING CLINICIAN:  ALVERTO ALCANTAR     FINDINGS:  Severe tricompartment productive degenerative changes. No fracture or  dislocation. No osseous lesion. No effusion.     Impression: Severe left knee osteoarthrosis       Patient ID: Georgie Mariano is a 77 y.o. female.    L Inj/Asp: L knee on 10/6/2023 7:30 AM  Indications: pain  Details: ultrasound-guided lateral approach  Medications: 2.5 mg triamcinolone acetonide 40 mg/mL  Consent was given by the patient.       DIAGNOSTIC ULTRASOUND REPORT FINAL: Left KNEE  Sonographer: Phil Minor MD  Indication: Knee Pain  Procedure: Ultrasound, extremity, nonvascular, real-time, COMPLETE, anatomic specific  Technique: B-Mode Ultrasound Examination performed using 6- 9 MHz linear transducer with [employer]  STUDY TYPE:   1. ULTRASOUND EXTREMITY  2. REAL TIME WITH IMAGE DOCUMENTATION  3. NON-VASCULAR  4. COMPLETE STUDY, INCLUDING BUT NOT LIMITED TO MUSCLE, TENDONS, LIGAMENTS, SOFT TISSUES, ADIPOSE TISSUE AND SUBCUTANEOUS TISSUE.  Site: KNEE   Live ultrasound was performed  with of patient's  KNEE and PERMANENTLY documented. I personally performed the ultrasound and reviewed the findings. These show:    Cass-articular evaluation:   An intact Quadriceps Tendon with the Quadriceps Muscle fibers showing normal striations Quadriceps Tendon demonstrating normal fibrillar pattern. . The Patellar Tendon demonstrates normal fibrillar pattern and is intact.   No significant soft tissue fluid collection/abscess appreciated.     Joint Evaluation:  The lateral joint line shows an intact LCL.   Medial joint line exam shows an intact MCL.   The patellar tendon was within normal limits.  Mild joint effusion noted.     The patient tolerated the procedure well.    Patient was made aware of the risks and benefits of the procedure and he consented to having a knee injection.  Under sterile conditions 40 mg of Kenalog and a mixture of 3 cc 1% lidocaine and 3 cc quarter percent Marcaine were injected into the intra-articular knee space.  Post-injection instructions were reviewed with the patient and the patient voiced understanding of these instructions.  Patient did well with this and had a excellent lidocaine suppression test.    Assessment/Plan     Arthritis of knee, left  Assessment: Left knee arthritis-tricompartmental with varus narrowing    Plan:  Ultrasound-guided Kenalog injection with 40 mg of Kenalog and lidocaine.  Excellent lidocaine suppression test.  Continue with home exercise program.  Nonsteroidal anti-inflammatories and Voltaren were not utilized as the patient has stage IV chronic kidney disease.  Follow-up in 6 weeks for reevaluation.   Encounter Diagnoses:  Chronic pain of left knee    Encounter for other specified special examinations    Orders Placed This Encounter    Point of Care Ultrasound     No follow-ups on file.

## 2023-11-07 ENCOUNTER — OFFICE VISIT (OUTPATIENT)
Dept: PRIMARY CARE | Facility: CLINIC | Age: 78
End: 2023-11-07
Payer: MEDICARE

## 2023-11-07 VITALS
WEIGHT: 203 LBS | DIASTOLIC BLOOD PRESSURE: 82 MMHG | HEIGHT: 61 IN | HEART RATE: 68 BPM | BODY MASS INDEX: 38.33 KG/M2 | SYSTOLIC BLOOD PRESSURE: 142 MMHG

## 2023-11-07 DIAGNOSIS — R05.2 SUBACUTE COUGH: Primary | ICD-10-CM

## 2023-11-07 PROCEDURE — 1160F RVW MEDS BY RX/DR IN RCRD: CPT | Performed by: FAMILY MEDICINE

## 2023-11-07 PROCEDURE — 1159F MED LIST DOCD IN RCRD: CPT | Performed by: FAMILY MEDICINE

## 2023-11-07 PROCEDURE — 99213 OFFICE O/P EST LOW 20 MIN: CPT | Performed by: FAMILY MEDICINE

## 2023-11-07 PROCEDURE — 3079F DIAST BP 80-89 MM HG: CPT | Performed by: FAMILY MEDICINE

## 2023-11-07 PROCEDURE — 1125F AMNT PAIN NOTED PAIN PRSNT: CPT | Performed by: FAMILY MEDICINE

## 2023-11-07 PROCEDURE — 3077F SYST BP >= 140 MM HG: CPT | Performed by: FAMILY MEDICINE

## 2023-11-07 PROCEDURE — 1036F TOBACCO NON-USER: CPT | Performed by: FAMILY MEDICINE

## 2023-11-07 RX ORDER — BENZONATATE 100 MG/1
100 CAPSULE ORAL 3 TIMES DAILY PRN
Qty: 20 CAPSULE | Refills: 0 | Status: SHIPPED | OUTPATIENT
Start: 2023-11-07 | End: 2024-02-20 | Stop reason: ALTCHOICE

## 2023-11-07 RX ORDER — PREDNISONE 20 MG/1
40 TABLET ORAL DAILY
Qty: 10 TABLET | Refills: 0 | Status: SHIPPED | OUTPATIENT
Start: 2023-11-07 | End: 2023-11-12

## 2023-11-07 RX ORDER — AZITHROMYCIN 500 MG/1
500 TABLET, FILM COATED ORAL DAILY
Qty: 5 TABLET | Refills: 0 | Status: SHIPPED | OUTPATIENT
Start: 2023-11-07 | End: 2023-11-12

## 2023-11-07 ASSESSMENT — ENCOUNTER SYMPTOMS: COUGH: 1

## 2023-11-07 NOTE — PATIENT INSTRUCTIONS
Call concerns.   Ways to Help Prevent Falls at Home    Quick Tips   ? Ask for help if you need it. Most people want to help!   ? Get up slowly after sitting or laying down   ? Wear a medical alert device or keep cell phone in your pocket   ? Use night lights, especially areas near a bathroom   ? Keep the items you use often within reach on a small stool or end table   ? Use an assistive device such as walker or cane, as directed by provider/physical therapy   ? Use a non-slip mat and grab bars in your bathroom. Look for home health sections for best options     Other Areas to Focus On   ? Exercise and nutrition: Regular exercise or taking a falls prevention class are great ways improve strength and balance. Don’t forget to stay hydrated and bring a snack!   ? Medicine side effects: Some medicines can make you sleepy or dizzy, which could cause a fall. Ask your healthcare provider about the side effects your medicines could cause. Be sure to let them know if you take any vitamins or supplements as well.   ? Tripping hazards: Remove items you could trip on, such as loose mats, rugs, cords, and clutter. Wear closed toe shoes with rubber soles.   ? Health and wellness: Get regular checkups with your healthcare provider, plus routine vision and hearing screenings. Talk with your healthcare provider about:   o Your medicines and the possible side effects - bring them in a bag if that is easier!   o Problems with balance or feeling dizzy   o Ways to promote bone health, such as Vitamin D and calcium supplements   o Questions or concerns about falling     *Ask your healthcare team if you have questions     [facility], 2022         Ways to Help Prevent Falls at Home    Quick Tips   ? Ask for help if you need it. Most people want to help!   ? Get up slowly after sitting or laying down   ? Wear a medical alert device or keep cell phone in your pocket   ? Use night lights, especially areas near a bathroom   ?  Keep the items you use often within reach on a small stool or end table   ? Use an assistive device such as walker or cane, as directed by provider/physical therapy   ? Use a non-slip mat and grab bars in your bathroom. Look for home health sections for best options     Other Areas to Focus On   ? Exercise and nutrition: Regular exercise or taking a falls prevention class are great ways improve strength and balance. Don’t forget to stay hydrated and bring a snack!   ? Medicine side effects: Some medicines can make you sleepy or dizzy, which could cause a fall. Ask your healthcare provider about the side effects your medicines could cause. Be sure to let them know if you take any vitamins or supplements as well.   ? Tripping hazards: Remove items you could trip on, such as loose mats, rugs, cords, and clutter. Wear closed toe shoes with rubber soles.   ? Health and wellness: Get regular checkups with your healthcare provider, plus routine vision and hearing screenings. Talk with your healthcare provider about:   o Your medicines and the possible side effects - bring them in a bag if that is easier!   o Problems with balance or feeling dizzy   o Ways to promote bone health, such as Vitamin D and calcium supplements   o Questions or concerns about falling     *Ask your healthcare team if you have questions     ©[facility], 2022

## 2023-11-07 NOTE — PROGRESS NOTES
Subjective  Georgie Friedman is a 78 y.o. female who presents for Cough.  Karthikeyan Jacques presents with four weeks of cough and cold symptoms . Cough is worse at night.  Had fevers at first, and denies feeling short of breath. No sneezing, no itchy watery eyes.  No leg swelling.  Has runny nose and congestion  Review of Systems   Respiratory:  Positive for cough.    All other systems reviewed and are negative.  .    Objective     Visit Vitals  /82 (BP Location: Left arm, Patient Position: Sitting)   Pulse 68      Physical Exam  Vitals and nursing note reviewed.   Constitutional:       General: She is not in acute distress.     Appearance: Normal appearance. She is not toxic-appearing.   HENT:      Head: Normocephalic and atraumatic.   Cardiovascular:      Rate and Rhythm: Normal rate and regular rhythm.      Heart sounds: No murmur heard.  Pulmonary:      Effort: Pulmonary effort is normal.      Breath sounds: Normal breath sounds.      Comments: Barky bronchospasm like cough  Musculoskeletal:      Cervical back: Neck supple. No rigidity.      Comments: Normal gait   Skin:     General: Skin is warm and dry.   Neurological:      General: No focal deficit present.      Mental Status: She is alert and oriented to person, place, and time.   Psychiatric:         Mood and Affect: Mood normal.         Behavior: Behavior normal.         Assessment/Plan   Problem List Items Addressed This Visit    None  Visit Diagnoses       Subacute cough    -  Primary    Relevant Medications    azithromycin (Zithromax) 500 mg tablet    predniSONE (Deltasone) 20 mg tablet    benzonatate (Tessalon) 100 mg capsule                   Lindsey England MD Patient was identified as a fall risk. Risk prevention instructions provided.Patient was identified as a fall risk. Risk prevention instructions provided.

## 2023-11-16 ENCOUNTER — APPOINTMENT (OUTPATIENT)
Dept: ORTHOPEDIC SURGERY | Facility: CLINIC | Age: 78
End: 2023-11-16
Payer: MEDICARE

## 2023-12-13 ENCOUNTER — ANCILLARY PROCEDURE (OUTPATIENT)
Dept: RADIOLOGY | Facility: CLINIC | Age: 78
End: 2023-12-13
Payer: MEDICARE

## 2023-12-13 DIAGNOSIS — M17.12 ARTHRITIS OF KNEE, LEFT: ICD-10-CM

## 2023-12-13 PROCEDURE — 73564 X-RAY EXAM KNEE 4 OR MORE: CPT | Mod: LT

## 2023-12-13 PROCEDURE — 73564 X-RAY EXAM KNEE 4 OR MORE: CPT | Mod: LEFT SIDE | Performed by: RADIOLOGY

## 2023-12-14 ENCOUNTER — OFFICE VISIT (OUTPATIENT)
Dept: ORTHOPEDIC SURGERY | Facility: CLINIC | Age: 78
End: 2023-12-14
Payer: MEDICARE

## 2023-12-14 DIAGNOSIS — M17.11 PRIMARY OSTEOARTHRITIS OF RIGHT KNEE: ICD-10-CM

## 2023-12-14 DIAGNOSIS — M17.12 ARTHRITIS OF KNEE, LEFT: ICD-10-CM

## 2023-12-14 PROCEDURE — 99213 OFFICE O/P EST LOW 20 MIN: CPT | Performed by: SPECIALIST

## 2023-12-14 PROCEDURE — 1036F TOBACCO NON-USER: CPT | Performed by: SPECIALIST

## 2023-12-14 PROCEDURE — 1159F MED LIST DOCD IN RCRD: CPT | Performed by: SPECIALIST

## 2023-12-14 PROCEDURE — 1125F AMNT PAIN NOTED PAIN PRSNT: CPT | Performed by: SPECIALIST

## 2023-12-14 PROCEDURE — 1160F RVW MEDS BY RX/DR IN RCRD: CPT | Performed by: SPECIALIST

## 2023-12-14 RX ORDER — DICLOFENAC SODIUM 10 MG/G
4 GEL TOPICAL 4 TIMES DAILY PRN
Qty: 100 G | Refills: 1 | Status: SHIPPED | OUTPATIENT
Start: 2023-12-14 | End: 2023-12-15 | Stop reason: ENTERED-IN-ERROR

## 2023-12-14 ASSESSMENT — ENCOUNTER SYMPTOMS: KNEE DEFORMITY: 1

## 2023-12-14 ASSESSMENT — PAIN - FUNCTIONAL ASSESSMENT: PAIN_FUNCTIONAL_ASSESSMENT: NO/DENIES PAIN

## 2023-12-14 NOTE — PROGRESS NOTES
Subjective    Patient ID: Georgie Friedman is a 78 y.o. female.    Chief Complaint: Pain of the Left Knee (FUV LEFT KNEE PAIN/X-RAYS 12-12-23/LAST INJECTION KENALOG 10-6-23/PAIN RATE 0)     Last Surgery: No surgery found  Last Surgery Date: No surgery found    Left Knee       77-year-old female who comes in today for evaluation of her left knee pain.  She had x-rays which were obtained on 9/25/2023 and show tricompartmental arthritis with hypertrophic osteophytes and some varus angulation and medial joint space narrowing.  She was interested in exploring treatment options especially nonsurgical.  Objective   Ortho Exam    Left knee Exam    Appears healthy and in no acute distress.     Examination of the knee reveals the following:    Inspection: In the standing position, knee alignment is normal.   There is no muscle atrophy around the knee.   Walking gait is slightly antalgic.     In the supine position, there is no obvious leg length discrepancy or difference in alignment.     Palpation:  No calor- warmth  No tumor- soft tissue swelling  Minimal effusion.  No ecchymosis.    Dulor- pain:  Minimal tenderness to the lateral joint line.  Mild tenderness to the medial joint line.  Minimal pain with patellofemoral compression.    Neurovascular:  Dorsalis pedis pulse is palpable with brisk capillary refill.  Sensation is intact over the lower leg, dorsum of the foot, plantar aspect  and first web space.   The calves are non-tender to palpation bilaterally.    Range of Motion:   Active knee extention is  0 degrees and is painless.  Active knee flexion is >125 degrees and is painless.  Active ankle range of motion is full.   Full passive internal and external rotation of the hip does not cause any pain.  Normal flexibility in the hamstrings, quadriceps and heel cords.    Strength Testing:     Hip flexors, hip extensors, abductors, adductors, knee flexors, knee extensors, ankle dorsiflexor and ankle plantar flexor  strength are all 5/5.    Stability and Provocative Testing:   Medial Collateral Ligament: There is no laxity or pain with valgus stress testing at 0 and 15 degrees of knee flexion.  Lateral Collateral Ligament: There is no laxity or pain with varus stress testing at 0 and 15 degrees of knee flexion.  Anterior Cruciate Ligament: Anterior Drawer reveals a firm endpoint and no laxity.  Anterior Cruciate Ligament: Lachman's reveals a firm endpoint and no laxity.  Posterior Cruciate Ligament: There is no posterior translation of the tibia plateau.     Medial and Lateral Menisci: Jeremiah's test is negative.    Hip- Range of motion and cursory exam was non-tender and not provacative.    Image Results:  XR knee complete 4 or more views  Narrative: Interpreted By:  LEIDY HIGHTOWER MD  MRN: [id number]  Patient Name: GEORGIE MARIANO     STUDY:  KNEE; COMPLT, 4 OR MORE VIEWS;  9/25/2023 10:10 am     INDICATION:  left knee pain.     COMPARISON:  none     ACCESSION NUMBER(S):  [id number]     ORDERING CLINICIAN:  ALVERTO ALCANTAR     FINDINGS:  Severe tricompartment productive degenerative changes. No fracture or  dislocation. No osseous lesion. No effusion.     Impression: Severe left knee osteoarthrosis       Patient ID: Georgie Mariano is a 78 y.o. female.    ProceduresDIAGNOSTIC ULTRASOUND REPORT FINAL: Left KNEE  Sonographer: Phil Minor MD  Indication: Knee Pain  Procedure: Ultrasound, extremity, nonvascular, real-time, COMPLETE, anatomic specific  Technique: B-Mode Ultrasound Examination performed using 6- 9 MHz linear transducer with [employer]  STUDY TYPE:   1. ULTRASOUND EXTREMITY  2. REAL TIME WITH IMAGE DOCUMENTATION  3. NON-VASCULAR  4. COMPLETE STUDY, INCLUDING BUT NOT LIMITED TO MUSCLE, TENDONS, LIGAMENTS, SOFT TISSUES, ADIPOSE TISSUE AND SUBCUTANEOUS TISSUE.  Site: KNEE   Live ultrasound was performed with of patient's  KNEE and PERMANENTLY documented. I personally performed the ultrasound and reviewed the  findings. These show:    Cass-articular evaluation:   An intact Quadriceps Tendon with the Quadriceps Muscle fibers showing normal striations Quadriceps Tendon demonstrating normal fibrillar pattern. . The Patellar Tendon demonstrates normal fibrillar pattern and is intact.   No significant soft tissue fluid collection/abscess appreciated.     Joint Evaluation:  The lateral joint line shows an intact LCL.   Medial joint line exam shows an intact MCL.   The patellar tendon was within normal limits.  Mild joint effusion noted.     The patient tolerated the procedure well.      Assessment/Plan     Arthritis of knee, left  Assessment: Left knee osteoarthritis.  She had an injection of 40 mg of Kenalog on 10/6/2023.  She has varus arthritis.  She is doing 80 to 90% improved from the Kenalog injection.  She states that she had a little soreness after shopping a lot this last weekend.      Plan:  Continue with the gentle home exercise program.  Voltaren gel use as directed.  For as needed basis.  Follow-up in 2 to 3 months for reevaluation.  She would be a good candidate for viscosupplementation's if the cortisone benefit diminishes.   Encounter Diagnoses:  Arthritis of knee, left    Primary osteoarthritis of right knee    Orders Placed This Encounter    XR knee left 4+ views    Point of Care Ultrasound     No follow-ups on file.

## 2023-12-14 NOTE — ASSESSMENT & PLAN NOTE
Assessment: Left knee osteoarthritis.  She had an injection of 40 mg of Kenalog on 10/6/2023.  She has varus arthritis.  She is doing 80 to 90% improved from the Kenalog injection.  She states that she had a little soreness after shopping a lot this last weekend.      Plan:  Continue with the gentle home exercise program.  Voltaren gel use as directed.  For as needed basis.  Follow-up in 2 to 3 months for reevaluation.  She would be a good candidate for viscosupplementation's if the cortisone benefit diminishes.

## 2023-12-15 ENCOUNTER — TELEPHONE (OUTPATIENT)
Dept: ORTHOPEDIC SURGERY | Facility: CLINIC | Age: 78
End: 2023-12-15
Payer: MEDICARE

## 2023-12-15 DIAGNOSIS — M17.11 PRIMARY OSTEOARTHRITIS OF RIGHT KNEE: ICD-10-CM

## 2023-12-15 DIAGNOSIS — M17.12 ARTHRITIS OF KNEE, LEFT: ICD-10-CM

## 2023-12-15 NOTE — TELEPHONE ENCOUNTER
GEL FOR KNEE WAS SUPPOSED TO [facility] AND THE PATIENT CALLED AND IT IS NOT THERE. CAN YOU PLEASE PUT THE RX IN AND UPDATE THE PATIENT WHEN YOU HAVE DONE SO.

## 2023-12-15 NOTE — TELEPHONE ENCOUNTER
GEL FOR KNEE WAS SUPPOSED TO [facility] AND THE PATIENT CALLED AND IT IS NOT THERE. CAN YOU PLEASE PUT THE RX IN AND UPDATE THE PATIENT WHEN YOU HAVE DONE SO.      This nurse changed the patient pharmacy of choice and sent of the medication per doctor request

## 2023-12-19 RX ORDER — DICLOFENAC SODIUM 10 MG/G
4 GEL TOPICAL 4 TIMES DAILY PRN
Qty: 100 G | Refills: 1 | Status: SHIPPED | OUTPATIENT
Start: 2023-12-19

## 2024-01-02 DIAGNOSIS — E78.49 OTHER HYPERLIPIDEMIA: ICD-10-CM

## 2024-01-02 DIAGNOSIS — E11.22 TYPE 2 DIABETES MELLITUS WITH CHRONIC KIDNEY DISEASE, WITHOUT LONG-TERM CURRENT USE OF INSULIN, UNSPECIFIED CKD STAGE (MULTI): ICD-10-CM

## 2024-01-02 DIAGNOSIS — I10 PRIMARY HYPERTENSION: Primary | ICD-10-CM

## 2024-01-02 DIAGNOSIS — I10 HYPERTENSION, UNSPECIFIED TYPE: ICD-10-CM

## 2024-01-02 DIAGNOSIS — F33.9 RECURRENT MAJOR DEPRESSIVE DISORDER, REMISSION STATUS UNSPECIFIED (CMS-HCC): ICD-10-CM

## 2024-01-02 DIAGNOSIS — K21.9 GASTROESOPHAGEAL REFLUX DISEASE, UNSPECIFIED WHETHER ESOPHAGITIS PRESENT: ICD-10-CM

## 2024-01-02 RX ORDER — OMEPRAZOLE 40 MG/1
40 CAPSULE, DELAYED RELEASE ORAL
Qty: 180 CAPSULE | Refills: 1 | Status: SHIPPED | OUTPATIENT
Start: 2024-01-02 | End: 2024-05-28

## 2024-01-02 RX ORDER — NIFEDIPINE 30 MG/1
30 TABLET, FILM COATED, EXTENDED RELEASE ORAL DAILY
Qty: 90 TABLET | Refills: 3 | Status: SHIPPED | OUTPATIENT
Start: 2024-01-02 | End: 2024-02-20 | Stop reason: ALTCHOICE

## 2024-01-02 RX ORDER — LANCETS
1 EACH MISCELLANEOUS DAILY
Qty: 100 EACH | Refills: 1 | Status: SHIPPED | OUTPATIENT
Start: 2024-01-02 | End: 2024-05-28

## 2024-01-02 RX ORDER — DEXTROSE 4 G
1 TABLET,CHEWABLE ORAL DAILY
Qty: 1 EACH | Refills: 0 | Status: SHIPPED | OUTPATIENT
Start: 2024-01-02

## 2024-01-02 RX ORDER — ISOPROPYL ALCOHOL 70 ML/100ML
1 SWAB TOPICAL DAILY
Qty: 100 EACH | Refills: 1 | Status: SHIPPED | OUTPATIENT
Start: 2024-01-02 | End: 2024-05-28

## 2024-01-02 RX ORDER — LISINOPRIL 40 MG/1
40 TABLET ORAL DAILY
Qty: 90 TABLET | Refills: 1 | Status: SHIPPED | OUTPATIENT
Start: 2024-01-02 | End: 2024-05-28

## 2024-01-02 RX ORDER — SERTRALINE HYDROCHLORIDE 50 MG/1
50 TABLET, FILM COATED ORAL DAILY
Qty: 90 TABLET | Refills: 1 | Status: SHIPPED | OUTPATIENT
Start: 2024-01-02 | End: 2024-05-28

## 2024-01-02 RX ORDER — ROSUVASTATIN CALCIUM 10 MG/1
10 TABLET, COATED ORAL DAILY
Qty: 90 TABLET | Refills: 1 | Status: SHIPPED | OUTPATIENT
Start: 2024-01-02 | End: 2024-05-28

## 2024-01-02 RX ORDER — BLOOD SUGAR DIAGNOSTIC
1 STRIP MISCELLANEOUS DAILY
Qty: 100 STRIP | Refills: 1 | Status: SHIPPED | OUTPATIENT
Start: 2024-01-02 | End: 2024-05-28

## 2024-02-08 ENCOUNTER — LAB (OUTPATIENT)
Dept: LAB | Facility: LAB | Age: 79
End: 2024-02-08
Payer: MEDICARE

## 2024-02-08 DIAGNOSIS — N39.0 URINARY TRACT INFECTION WITHOUT HEMATURIA, SITE UNSPECIFIED: Primary | ICD-10-CM

## 2024-02-08 DIAGNOSIS — N18.4 TYPE 2 DIABETES MELLITUS WITH STAGE 4 CHRONIC KIDNEY DISEASE, WITHOUT LONG-TERM CURRENT USE OF INSULIN (MULTI): ICD-10-CM

## 2024-02-08 DIAGNOSIS — N39.0 URINARY TRACT INFECTION WITHOUT HEMATURIA, SITE UNSPECIFIED: ICD-10-CM

## 2024-02-08 DIAGNOSIS — E11.22 TYPE 2 DIABETES MELLITUS WITH STAGE 4 CHRONIC KIDNEY DISEASE, WITHOUT LONG-TERM CURRENT USE OF INSULIN (MULTI): ICD-10-CM

## 2024-02-08 DIAGNOSIS — E11.22 TYPE 2 DIABETES MELLITUS WITH DIABETIC CHRONIC KIDNEY DISEASE (MULTI): ICD-10-CM

## 2024-02-08 DIAGNOSIS — N18.4 CHRONIC KIDNEY DISEASE, STAGE 4 (SEVERE) (MULTI): Primary | ICD-10-CM

## 2024-02-08 LAB
ANION GAP SERPL CALC-SCNC: 12 MMOL/L (ref 10–20)
APPEARANCE UR: ABNORMAL
BACTERIA #/AREA URNS AUTO: ABNORMAL /HPF
BILIRUB UR STRIP.AUTO-MCNC: NEGATIVE MG/DL
BUN SERPL-MCNC: 26 MG/DL (ref 6–23)
CALCIUM SERPL-MCNC: 9.2 MG/DL (ref 8.6–10.3)
CHLORIDE SERPL-SCNC: 106 MMOL/L (ref 98–107)
CHOLEST SERPL-MCNC: 278 MG/DL (ref 0–199)
CHOLESTEROL/HDL RATIO: 4.7
CO2 SERPL-SCNC: 25 MMOL/L (ref 21–32)
COLOR UR: YELLOW
CREAT SERPL-MCNC: 1.61 MG/DL (ref 0.5–1.05)
CREAT UR-MCNC: 154 MG/DL (ref 20–320)
CREAT UR-MCNC: 158 MG/DL (ref 20–320)
EGFRCR SERPLBLD CKD-EPI 2021: 33 ML/MIN/1.73M*2
EST. AVERAGE GLUCOSE BLD GHB EST-MCNC: 126 MG/DL
GLUCOSE SERPL-MCNC: 104 MG/DL (ref 74–99)
GLUCOSE UR STRIP.AUTO-MCNC: NEGATIVE MG/DL
HBA1C MFR BLD: 6 %
HDLC SERPL-MCNC: 59 MG/DL
HYALINE CASTS #/AREA URNS AUTO: ABNORMAL /LPF
KETONES UR STRIP.AUTO-MCNC: NEGATIVE MG/DL
LDLC SERPL CALC-MCNC: 182 MG/DL
LEUKOCYTE ESTERASE UR QL STRIP.AUTO: ABNORMAL
MAGNESIUM SERPL-MCNC: 1.9 MG/DL (ref 1.6–2.4)
MICROALBUMIN UR-MCNC: 33.2 MG/L
MICROALBUMIN/CREAT UR: 21 UG/MG CREAT
MUCOUS THREADS #/AREA URNS AUTO: ABNORMAL /LPF
NITRITE UR QL STRIP.AUTO: NEGATIVE
NON HDL CHOLESTEROL: 219 MG/DL (ref 0–149)
PH UR STRIP.AUTO: 5 [PH]
PHOSPHATE SERPL-MCNC: 4.1 MG/DL (ref 2.5–4.9)
POTASSIUM SERPL-SCNC: 4.5 MMOL/L (ref 3.5–5.3)
PROT UR STRIP.AUTO-MCNC: NEGATIVE MG/DL
PROT UR-ACNC: 25 MG/DL (ref 5–24)
PROT/CREAT UR: 0.16 MG/MG CREAT (ref 0–0.17)
RBC # UR STRIP.AUTO: NEGATIVE /UL
RBC #/AREA URNS AUTO: ABNORMAL /HPF
SODIUM SERPL-SCNC: 138 MMOL/L (ref 136–145)
SP GR UR STRIP.AUTO: 1.01
SQUAMOUS #/AREA URNS AUTO: ABNORMAL /HPF
TRIGL SERPL-MCNC: 183 MG/DL (ref 0–149)
TSH SERPL-ACNC: 2.15 MIU/L (ref 0.44–3.98)
URATE SERPL-MCNC: 5.4 MG/DL (ref 2.3–6.7)
UROBILINOGEN UR STRIP.AUTO-MCNC: <2 MG/DL
VIT B12 SERPL-MCNC: 681 PG/ML (ref 211–911)
VLDL: 37 MG/DL (ref 0–40)
WBC #/AREA URNS AUTO: >50 /HPF
WBC CLUMPS #/AREA URNS AUTO: ABNORMAL /HPF

## 2024-02-08 PROCEDURE — 80048 BASIC METABOLIC PNL TOTAL CA: CPT

## 2024-02-08 PROCEDURE — 36415 COLL VENOUS BLD VENIPUNCTURE: CPT

## 2024-02-08 PROCEDURE — 84443 ASSAY THYROID STIM HORMONE: CPT

## 2024-02-08 PROCEDURE — 82043 UR ALBUMIN QUANTITATIVE: CPT

## 2024-02-08 PROCEDURE — 83735 ASSAY OF MAGNESIUM: CPT

## 2024-02-08 PROCEDURE — 81001 URINALYSIS AUTO W/SCOPE: CPT

## 2024-02-08 PROCEDURE — 82570 ASSAY OF URINE CREATININE: CPT

## 2024-02-08 PROCEDURE — 82607 VITAMIN B-12: CPT

## 2024-02-08 PROCEDURE — 84550 ASSAY OF BLOOD/URIC ACID: CPT

## 2024-02-08 PROCEDURE — 84100 ASSAY OF PHOSPHORUS: CPT

## 2024-02-08 PROCEDURE — 83036 HEMOGLOBIN GLYCOSYLATED A1C: CPT

## 2024-02-08 PROCEDURE — 87086 URINE CULTURE/COLONY COUNT: CPT | Mod: SAMLAB | Performed by: CLINICAL NURSE SPECIALIST

## 2024-02-08 PROCEDURE — 84156 ASSAY OF PROTEIN URINE: CPT

## 2024-02-08 PROCEDURE — 80061 LIPID PANEL: CPT

## 2024-02-10 LAB — BACTERIA UR CULT: ABNORMAL

## 2024-02-15 ENCOUNTER — APPOINTMENT (OUTPATIENT)
Dept: PRIMARY CARE | Facility: CLINIC | Age: 79
End: 2024-02-15
Payer: MEDICARE

## 2024-02-15 ENCOUNTER — OFFICE VISIT (OUTPATIENT)
Dept: NEPHROLOGY | Facility: CLINIC | Age: 79
End: 2024-02-15
Payer: MEDICARE

## 2024-02-15 VITALS
BODY MASS INDEX: 38.29 KG/M2 | HEIGHT: 61 IN | HEART RATE: 76 BPM | SYSTOLIC BLOOD PRESSURE: 136 MMHG | DIASTOLIC BLOOD PRESSURE: 80 MMHG | WEIGHT: 202.8 LBS

## 2024-02-15 DIAGNOSIS — N18.4 TYPE 2 DIABETES MELLITUS WITH STAGE 4 CHRONIC KIDNEY DISEASE, WITHOUT LONG-TERM CURRENT USE OF INSULIN (MULTI): ICD-10-CM

## 2024-02-15 DIAGNOSIS — E11.22 TYPE 2 DIABETES MELLITUS WITH STAGE 4 CHRONIC KIDNEY DISEASE, WITHOUT LONG-TERM CURRENT USE OF INSULIN (MULTI): ICD-10-CM

## 2024-02-15 DIAGNOSIS — N18.4 STAGE 4 CHRONIC KIDNEY DISEASE (MULTI): ICD-10-CM

## 2024-02-15 DIAGNOSIS — I10 PRIMARY HYPERTENSION: Primary | ICD-10-CM

## 2024-02-15 PROCEDURE — 1036F TOBACCO NON-USER: CPT | Performed by: CLINICAL NURSE SPECIALIST

## 2024-02-15 PROCEDURE — 3075F SYST BP GE 130 - 139MM HG: CPT | Performed by: CLINICAL NURSE SPECIALIST

## 2024-02-15 PROCEDURE — 99213 OFFICE O/P EST LOW 20 MIN: CPT | Performed by: CLINICAL NURSE SPECIALIST

## 2024-02-15 PROCEDURE — 3079F DIAST BP 80-89 MM HG: CPT | Performed by: CLINICAL NURSE SPECIALIST

## 2024-02-15 PROCEDURE — 1160F RVW MEDS BY RX/DR IN RCRD: CPT | Performed by: CLINICAL NURSE SPECIALIST

## 2024-02-15 PROCEDURE — 1125F AMNT PAIN NOTED PAIN PRSNT: CPT | Performed by: CLINICAL NURSE SPECIALIST

## 2024-02-15 PROCEDURE — 1159F MED LIST DOCD IN RCRD: CPT | Performed by: CLINICAL NURSE SPECIALIST

## 2024-02-15 RX ORDER — ASPIRIN 81 MG/1
81 TABLET ORAL DAILY
COMMUNITY

## 2024-02-15 ASSESSMENT — ENCOUNTER SYMPTOMS
CONSTITUTIONAL NEGATIVE: 1
GASTROINTESTINAL NEGATIVE: 1
JOINT SWELLING: 1
PSYCHIATRIC NEGATIVE: 1
CARDIOVASCULAR NEGATIVE: 1
ENDOCRINE NEGATIVE: 1
RESPIRATORY NEGATIVE: 1

## 2024-02-15 NOTE — PROGRESS NOTES
Subjective   Patient ID: Georgie Friedman is a 78 y.o. female who presents for Follow-up (6 month ck/Review labs 2/8).  Patient being seen in follow-up for chronic kidney disease stage IV with history of hypertension and diabetes mellitus    Labs reviewed  Urinalysis with WBCs greater than 50, RBC 6-10, 1+ bacteria, urine culture was completed which showed multiple organisms and contamination  Albumin creatinine ratio 21.0  Total protein creatinine ratio 0.16  Uric acid 5.4  Phosphorus 4.1  Magnesium 1.90  Glucose 104  Sodium 138, potassium 4.5, chloride 106, bicarb 25  Renal function with a BUN of 26 and creatinine of 1.61 with GFR of 33  Total cholesterol 278, HDL 59, , triglycerides 183 hemoglobin A1c 6.0    She is doing well  She is complaining of some left knee pain and has had to stop working secondary to this  She has no edema  Her blood pressure is well controlled        Review of Systems   Constitutional: Negative.    Respiratory: Negative.     Cardiovascular: Negative.    Gastrointestinal: Negative.    Endocrine: Negative.    Genitourinary: Negative.    Musculoskeletal:  Positive for gait problem and joint swelling (left knee).   Skin: Negative.    Psychiatric/Behavioral: Negative.         Objective   Physical Exam  Vitals reviewed.   Constitutional:       Appearance: Normal appearance.   HENT:      Head: Normocephalic.   Cardiovascular:      Rate and Rhythm: Normal rate and regular rhythm.   Pulmonary:      Effort: Pulmonary effort is normal.      Breath sounds: Normal breath sounds.   Abdominal:      Palpations: Abdomen is soft.   Musculoskeletal:         General: Normal range of motion.   Skin:     General: Skin is warm and dry.   Neurological:      Mental Status: She is alert and oriented to person, place, and time.   Psychiatric:         Mood and Affect: Mood normal.         Behavior: Behavior normal.         Assessment/Plan   Problem List Items Addressed This Visit             ICD-10-CM     Diabetes mellitus with chronic kidney disease, without long-term current use of insulin (CMS/[facility]) E11.22     Last Hgb A1c at 6, no longer on Metformin, will start low dose SGLT-2 (Jardiance) for renal protection         Relevant Medications    empagliflozin (Jardiance) 10 mg    Other Relevant Orders    Basic metabolic panel    Basic metabolic panel    Urinalysis with Reflex Microscopic    Follow Up In Nephrology    Hypertension - Primary I10     Blood pressure is well controlled on Lisinopril and nifedipine         Stage 4 chronic kidney disease (CMS/[facility]) N18.4     Creatinine slightly improved at 1.61, with GFR at 33, will continue to monitor         Relevant Medications    empagliflozin (Jardiance) 10 mg    Other Relevant Orders    Basic metabolic panel    Basic metabolic panel    Urinalysis with Reflex Microscopic    Follow Up In Nephrology     Chronic kidney disease stage IV with baseline creatinine 1.8-2  Diabetes mellitus type 2 on Metformin with hemoglobin A1c of 6.2  Hypertension on nifedipine and lisinopril  Obesity  Obstructive sleep apnea  Hypothyroidism  Normocytic anemia  Dyslipidemia  Hematuria: Longstanding  Renal Cyst           SHARMILA Roth-HUNTER, DNP 02/15/24 10:58 AM

## 2024-02-15 NOTE — ASSESSMENT & PLAN NOTE
Last Hgb A1c at 6, no longer on Metformin, will start low dose SGLT-2 (Jardiance) for renal protection

## 2024-02-16 ENCOUNTER — OFFICE VISIT (OUTPATIENT)
Dept: ORTHOPEDIC SURGERY | Facility: CLINIC | Age: 79
End: 2024-02-16
Payer: MEDICARE

## 2024-02-16 DIAGNOSIS — M17.12 ARTHRITIS OF KNEE, LEFT: ICD-10-CM

## 2024-02-16 DIAGNOSIS — M17.11 PRIMARY OSTEOARTHRITIS OF RIGHT KNEE: ICD-10-CM

## 2024-02-16 PROCEDURE — 99214 OFFICE O/P EST MOD 30 MIN: CPT | Performed by: SPECIALIST

## 2024-02-16 PROCEDURE — 1036F TOBACCO NON-USER: CPT | Performed by: SPECIALIST

## 2024-02-16 PROCEDURE — 1160F RVW MEDS BY RX/DR IN RCRD: CPT | Performed by: SPECIALIST

## 2024-02-16 PROCEDURE — 1125F AMNT PAIN NOTED PAIN PRSNT: CPT | Performed by: SPECIALIST

## 2024-02-16 PROCEDURE — 1159F MED LIST DOCD IN RCRD: CPT | Performed by: SPECIALIST

## 2024-02-16 PROCEDURE — 20611 DRAIN/INJ JOINT/BURSA W/US: CPT | Performed by: SPECIALIST

## 2024-02-16 RX ORDER — TRIAMCINOLONE ACETONIDE 40 MG/ML
40 INJECTION, SUSPENSION INTRA-ARTICULAR; INTRAMUSCULAR
Status: COMPLETED | OUTPATIENT
Start: 2024-02-16 | End: 2024-02-16

## 2024-02-16 RX ADMIN — TRIAMCINOLONE ACETONIDE 40 MG: 40 INJECTION, SUSPENSION INTRA-ARTICULAR; INTRAMUSCULAR at 09:43

## 2024-02-16 ASSESSMENT — PAIN SCALES - GENERAL: PAINLEVEL_OUTOF10: 8

## 2024-02-16 ASSESSMENT — PAIN - FUNCTIONAL ASSESSMENT: PAIN_FUNCTIONAL_ASSESSMENT: 0-10

## 2024-02-16 ASSESSMENT — PAIN DESCRIPTION - DESCRIPTORS: DESCRIPTORS: SHARP

## 2024-02-16 NOTE — PROGRESS NOTES
Assessment/Plan   Encounter Diagnoses:  Arthritis of knee, left    Primary osteoarthritis of right knee  Arthritis of knee, left  Assessment: Left knee arthritis    Plan:  Under ultrasound control 40 mg of Kenalog and lidocaine were injected into the left knee.  She had an excellent lidocaine suppression test.  She had done well with cortisone injections in the past especially to the right knee which is feeling quite good right now.  She has changed her diet to a more whole plant-based diet and she feels this helps her.  It actually helped her renal disease quite a bit.  She would benefit from viscosupplementation gel injections and we will try to get approval for these.  She will follow-up with new x-rays in 4 to 6 weeks for reevaluation.       Subjective    Patient ID: Georgie Friedman is a 78 y.o. female.    Chief Complaint: Pain of the Left Knee (Patient had cortisone injection done on 10/6/2023)     Last Surgery: No surgery found  Last Surgery Date: No surgery found    HPI  78-year-old who comes in today for evaluation of her left knee.  She has benefited from cortisone injections in the past.  Her last injections were in October of last year or older than that.  She states she would consider a booster injection today.    OBJECTIVE: ORTHO EXAM    Left knee:  Skin healthy and intact  No gross swelling or ecchymosis  Alignment: Varus  Effusion: Minimal  ROM: 0 degrees Extension   110 degrees Flexion  Minimal crepitance with range of motion  No pain with internal rotation of the hip  Tenderness to palpation: Medial joint line     20 degrees laxity to valgus stress  Neutral laxity to varus stress  Negative Lachman´s test  Negative posterior drawer test  Mild pain with Jeremiah´s test     Neurovascular exam normal distally  2+ DP pulse and good cap refill    IMAGE RESULTS:  XR knee left 4+ views  Narrative: Interpreted By:  Elysia Antonio,   STUDY:  Left knee, 4 views.      INDICATION:  Signs/Symptoms:pain.       COMPARISON:  09/25/2023.      ACCESSION NUMBER(S):  [id number]      ORDERING CLINICIAN:  LAURYN PERSAUD      FINDINGS:  No acute fracture.  There is mild varus angulation of the knee.  Severe end-stage medial compartment osteoarthrosis with joint space  loss and osteophytes. Moderate lateral and mild patellofemoral  compartment degenerative changes as well. No significant joint  effusion.      Impression: 1. End-stage severe medial and moderate lateral compartment  osteoarthrosis with varus angulation of the left knee. Findings are  not significantly changed when compared with previous radiograph  09/25/2023.      MACRO:  None.      Signed by: Elysia Paco 12/14/2023 2:39 PM  Dictation workstation:   [id number]      ULTRASOUND  DIAGNOSTIC ULTRASOUND REPORT FINAL: Left KNEE  Sonographer: Lauryn Persaud MD  Indication: Knee Pain  Procedure: Ultrasound, extremity, nonvascular, real-time, COMPLETE, anatomic specific  Technique: B-Mode Ultrasound Examination performed using 6- 9 MHz linear transducer with [employer]  STUDY TYPE:   1. ULTRASOUND EXTREMITY  2. REAL TIME WITH IMAGE DOCUMENTATION  3. NON-VASCULAR  4. COMPLETE STUDY, INCLUDING BUT NOT LIMITED TO MUSCLE, TENDONS, LIGAMENTS, SOFT TISSUES, ADIPOSE TISSUE AND SUBCUTANEOUS TISSUE.  Site: KNEE   Live ultrasound was performed with of patient's  KNEE and PERMANENTLY documented. I personally performed the ultrasound and reviewed the findings. These show:    Cass-articular evaluation:   An intact Quadriceps Tendon with the Quadriceps Muscle fibers showing normal striations Quadriceps Tendon demonstrating normal fibrillar pattern. . The Patellar Tendon demonstrates normal fibrillar pattern and is intact.   No significant soft tissue fluid collection/abscess appreciated.     Joint Evaluation:  The lateral joint line shows an intact LCL.   Medial joint line exam shows an intact MCL.   The patellar tendon was within normal limits.  Minimal to mild joint effusion  noted.     The patient tolerated the procedure well.        L Inj/Asp: R knee on 2/16/2024 9:43 AM  Details: ultrasound-guided superolateral approach  Medications: 40 mg triamcinolone acetonide 40 mg/mL  Aspirate: clear  Procedure, treatment alternatives, risks and benefits explained, specific risks discussed. Immediately prior to procedure a time out was called to verify the correct patient, procedure, equipment, support staff and site/side marked as required. Patient was prepped and draped in the usual sterile fashion.            Orders Placed This Encounter    Point of Care Ultrasound

## 2024-02-20 ENCOUNTER — OFFICE VISIT (OUTPATIENT)
Dept: PRIMARY CARE | Facility: CLINIC | Age: 79
End: 2024-02-20
Payer: MEDICARE

## 2024-02-20 VITALS
BODY MASS INDEX: 38.51 KG/M2 | DIASTOLIC BLOOD PRESSURE: 80 MMHG | HEART RATE: 72 BPM | WEIGHT: 204 LBS | SYSTOLIC BLOOD PRESSURE: 130 MMHG | HEIGHT: 61 IN

## 2024-02-20 DIAGNOSIS — N18.4 TYPE 2 DIABETES MELLITUS WITH STAGE 4 CHRONIC KIDNEY DISEASE, WITHOUT LONG-TERM CURRENT USE OF INSULIN (MULTI): ICD-10-CM

## 2024-02-20 DIAGNOSIS — Z11.59 NEED FOR HEPATITIS C SCREENING TEST: ICD-10-CM

## 2024-02-20 DIAGNOSIS — Z12.31 SCREENING MAMMOGRAM FOR BREAST CANCER: ICD-10-CM

## 2024-02-20 DIAGNOSIS — F33.0 MILD EPISODE OF RECURRENT MAJOR DEPRESSIVE DISORDER (CMS-HCC): ICD-10-CM

## 2024-02-20 DIAGNOSIS — E11.22 TYPE 2 DIABETES MELLITUS WITH STAGE 4 CHRONIC KIDNEY DISEASE, WITHOUT LONG-TERM CURRENT USE OF INSULIN (MULTI): ICD-10-CM

## 2024-02-20 DIAGNOSIS — Z00.00 ROUTINE GENERAL MEDICAL EXAMINATION AT HEALTH CARE FACILITY: Primary | ICD-10-CM

## 2024-02-20 PROCEDURE — 1125F AMNT PAIN NOTED PAIN PRSNT: CPT | Performed by: FAMILY MEDICINE

## 2024-02-20 PROCEDURE — 1160F RVW MEDS BY RX/DR IN RCRD: CPT | Performed by: FAMILY MEDICINE

## 2024-02-20 PROCEDURE — 1159F MED LIST DOCD IN RCRD: CPT | Performed by: FAMILY MEDICINE

## 2024-02-20 PROCEDURE — 1170F FXNL STATUS ASSESSED: CPT | Performed by: FAMILY MEDICINE

## 2024-02-20 PROCEDURE — G0439 PPPS, SUBSEQ VISIT: HCPCS | Performed by: FAMILY MEDICINE

## 2024-02-20 PROCEDURE — 1036F TOBACCO NON-USER: CPT | Performed by: FAMILY MEDICINE

## 2024-02-20 PROCEDURE — 99214 OFFICE O/P EST MOD 30 MIN: CPT | Performed by: FAMILY MEDICINE

## 2024-02-20 PROCEDURE — 3075F SYST BP GE 130 - 139MM HG: CPT | Performed by: FAMILY MEDICINE

## 2024-02-20 PROCEDURE — 3079F DIAST BP 80-89 MM HG: CPT | Performed by: FAMILY MEDICINE

## 2024-02-20 PROCEDURE — 1124F ACP DISCUSS-NO DSCNMKR DOCD: CPT | Performed by: FAMILY MEDICINE

## 2024-02-20 ASSESSMENT — PATIENT HEALTH QUESTIONNAIRE - PHQ9
SUM OF ALL RESPONSES TO PHQ9 QUESTIONS 1 AND 2: 0
2. FEELING DOWN, DEPRESSED OR HOPELESS: NOT AT ALL
1. LITTLE INTEREST OR PLEASURE IN DOING THINGS: NOT AT ALL

## 2024-02-20 ASSESSMENT — ACTIVITIES OF DAILY LIVING (ADL)
TAKING_MEDICATION: INDEPENDENT
GROCERY_SHOPPING: INDEPENDENT
BATHING: INDEPENDENT
MANAGING_FINANCES: INDEPENDENT
DOING_HOUSEWORK: INDEPENDENT
DRESSING: INDEPENDENT

## 2024-02-20 NOTE — PROGRESS NOTES
"Subjective   Reason for Visit: Georgie Friedman is an 78 y.o. female here for a Medicare Wellness visit.     Past Medical, Surgical, and Family History reviewed and updated in chart.    Reviewed all medications by prescribing practitioner or clinical pharmacist (such as prescriptions, OTCs, herbal therapies and supplements) and documented in the medical record.    HPI  DM, stable, A1c 6.0  Htn, stable  Hyperlipidemia, not at goal, just restarted statin  CKD, stable  Knee pain, following ortho  Depression stable      Patient Care Team:  Lindsey England MD as PCP - General (Family Medicine)  Lindsey England MD as PCP - [employer] PCP  Lindsey England MD as PCP - [employer]vantage PCP     Review of Systems   All other systems reviewed and are negative.      Objective   Vitals:  /80 (BP Location: Left arm, Patient Position: Sitting)   Pulse 72   Ht 1.549 m (5' 1\")   Wt 92.5 kg (204 lb)   BMI 38.55 kg/m²       Physical Exam  Vitals and nursing note reviewed.   Constitutional:       General: She is not in acute distress.     Appearance: Normal appearance. She is not toxic-appearing.   HENT:      Head: Normocephalic and atraumatic.   Cardiovascular:      Rate and Rhythm: Normal rate and regular rhythm.      Heart sounds: No murmur heard.  Pulmonary:      Effort: Pulmonary effort is normal.      Breath sounds: Normal breath sounds.   Musculoskeletal:      Cervical back: Neck supple. No rigidity.      Comments:     Skin:     General: Skin is warm and dry.   Neurological:      General: No focal deficit present.      Mental Status: She is alert and oriented to person, place, and time.   Psychiatric:         Mood and Affect: Mood normal.         Behavior: Behavior normal.         Assessment/Plan   Problem List Items Addressed This Visit       Diabetes mellitus with chronic kidney disease, without long-term current use of insulin (CMS/[facility])    Relevant Orders    CBC and Auto Differential    " Hemoglobin A1C    Major depression, recurrent (CMS/HCC)     Other Visit Diagnoses       Routine general medical examination at health care facility    -  Primary    Screening mammogram for breast cancer        Relevant Orders    BI mammo bilateral screening tomosynthesis    Need for hepatitis C screening test        Relevant Orders    Hepatitis C antibody

## 2024-03-05 ENCOUNTER — OFFICE VISIT (OUTPATIENT)
Dept: ORTHOPEDIC SURGERY | Facility: CLINIC | Age: 79
End: 2024-03-05
Payer: MEDICARE

## 2024-03-05 VITALS — BODY MASS INDEX: 38.55 KG/M2 | WEIGHT: 204 LBS

## 2024-03-05 DIAGNOSIS — M17.12 ARTHRITIS OF KNEE, LEFT: Primary | ICD-10-CM

## 2024-03-05 PROCEDURE — 1036F TOBACCO NON-USER: CPT | Performed by: SPECIALIST

## 2024-03-05 PROCEDURE — 99214 OFFICE O/P EST MOD 30 MIN: CPT | Performed by: SPECIALIST

## 2024-03-05 PROCEDURE — 1125F AMNT PAIN NOTED PAIN PRSNT: CPT | Performed by: SPECIALIST

## 2024-03-05 PROCEDURE — 1160F RVW MEDS BY RX/DR IN RCRD: CPT | Performed by: SPECIALIST

## 2024-03-05 PROCEDURE — 1159F MED LIST DOCD IN RCRD: CPT | Performed by: SPECIALIST

## 2024-03-05 PROCEDURE — 20611 DRAIN/INJ JOINT/BURSA W/US: CPT | Performed by: SPECIALIST

## 2024-03-05 ASSESSMENT — PAIN SCALES - GENERAL: PAINLEVEL_OUTOF10: 3

## 2024-03-05 ASSESSMENT — PAIN - FUNCTIONAL ASSESSMENT: PAIN_FUNCTIONAL_ASSESSMENT: 0-10

## 2024-03-05 NOTE — ASSESSMENT & PLAN NOTE
Left knee arthritis.  Patient comes in today for her first Orthovisc.  She benefited greatly from cortisone injections.    Plan:  Orthovisc No. 1 of 3 was administered.  She had an excellent lidocaine suppression test.  Follow-up in 1 week for consideration of Orthovisc 2 of 3.

## 2024-03-05 NOTE — PROGRESS NOTES
Assessment/Plan   Encounter Diagnoses:  Arthritis of knee, left  Arthritis of knee, left  Left knee arthritis.  Patient comes in today for her first Orthovisc.  She benefited greatly from cortisone injections.    Plan:  Orthovisc No. 1 of 3 was administered.  She had an excellent lidocaine suppression test.  Follow-up in 1 week for consideration of Orthovisc 2 of 3.       Subjective    Patient ID: Georgie Friedman is a 78 y.o. female.    Chief Complaint: Pain of the Left Knee (Lt knee inj)     Last Surgery: No surgery found  Last Surgery Date: No surgery found    HPI  78-year-old who comes in today with left knee arthritis.  She benefited greatly from cortisone injections.  She still has some symptoms and wishes to go forward with the Orthovisc series of 3.    OBJECTIVE: ORTHO EXAM  Left knee Exam    Hip motion is painless with flexion, internal and external rotation.     The skin is intact about the knee.  The extensor mechanism is intact. No Quadriceps, Posterior Thigh or Calf Atrophy is noted.  Incisions: None  Alignment: Varus with approximately 20 degrees standing alignment noted.   No Flexion Contracture or Recurvatum is noted.  There is mild effusion.  There is no erythema or warmth.  Range of motion: Ext 5, Flex 100  Pain with patellar compression minimal  Medial joint line pain moderate  Lateral joint line pain mild  Varus and valgus stressing 0 and 30 degrees: Corrects with Valgus stress to [] degrees  Lachman's: Negative  Anterior Drawer Negative  Posterior Drawer Negative  Calves are NT to palpation bilaterally  Edema Distal: Negative     Intact ankle dorsiflexion and plantarflexion.  Distal pulse 2+ palpable.    IMAGE RESULTS:  XR knee left 4+ views  Narrative: Interpreted By:  Elysia Antonio,   STUDY:  Left knee, 4 views.      INDICATION:  Signs/Symptoms:pain.      COMPARISON:  09/25/2023.      ACCESSION NUMBER(S):  [id number]      ORDERING CLINICIAN:  LAURYN PERSAUD      FINDINGS:  No acute  fracture.  There is mild varus angulation of the knee.  Severe end-stage medial compartment osteoarthrosis with joint space  loss and osteophytes. Moderate lateral and mild patellofemoral  compartment degenerative changes as well. No significant joint  effusion.      Impression: 1. End-stage severe medial and moderate lateral compartment  osteoarthrosis with varus angulation of the left knee. Findings are  not significantly changed when compared with previous radiograph  09/25/2023.      MACRO:  None.      Signed by: Elysia Antonio 12/14/2023 2:39 PM  Dictation workstation:   [id number]      ULTRASOUND        L Inj/Asp: L knee on 3/5/2024 3:27 PM  Indications: pain  Details: ultrasound-guided lateral approach  Medications: 30 mg hyaluronan 30 mg/2 mL  Procedure, treatment alternatives, risks and benefits explained, specific risks discussed. Consent was given by the patient. Immediately prior to procedure a time out was called to verify the correct patient, procedure, equipment, support staff and site/side marked as required. Patient was prepped and draped in the usual sterile fashion.            Orders Placed This Encounter    Point of Care Ultrasound

## 2024-03-12 ENCOUNTER — OFFICE VISIT (OUTPATIENT)
Dept: ORTHOPEDIC SURGERY | Facility: CLINIC | Age: 79
End: 2024-03-12
Payer: MEDICARE

## 2024-03-12 DIAGNOSIS — M17.12 ARTHRITIS OF KNEE, LEFT: ICD-10-CM

## 2024-03-12 DIAGNOSIS — M17.11 PRIMARY OSTEOARTHRITIS OF RIGHT KNEE: ICD-10-CM

## 2024-03-12 PROCEDURE — 1036F TOBACCO NON-USER: CPT | Performed by: SPECIALIST

## 2024-03-12 PROCEDURE — 20611 DRAIN/INJ JOINT/BURSA W/US: CPT | Performed by: SPECIALIST

## 2024-03-12 PROCEDURE — 1160F RVW MEDS BY RX/DR IN RCRD: CPT | Performed by: SPECIALIST

## 2024-03-12 PROCEDURE — 1159F MED LIST DOCD IN RCRD: CPT | Performed by: SPECIALIST

## 2024-03-12 PROCEDURE — 1125F AMNT PAIN NOTED PAIN PRSNT: CPT | Performed by: SPECIALIST

## 2024-03-12 RX ORDER — DICLOFENAC SODIUM 10 MG/G
4 GEL TOPICAL 4 TIMES DAILY PRN
Qty: 100 G | Refills: 1 | Status: SHIPPED | OUTPATIENT
Start: 2024-03-12

## 2024-03-12 ASSESSMENT — PAIN - FUNCTIONAL ASSESSMENT: PAIN_FUNCTIONAL_ASSESSMENT: NO/DENIES PAIN

## 2024-03-12 NOTE — PROGRESS NOTES
Assessment/Plan   Encounter Diagnoses:  Arthritis of knee, left    Primary osteoarthritis of right knee  Arthritis of knee, left  Assessment: Patient comes in today for her second Orthovisc to the left knee.  The first 1 did help her.    Plan: Orthovisc injection 2 of 3 today.  Follow-up in 1 week for reevaluation with 3 of 3 Orthovisc if she is doing well.       Subjective    Patient ID: Georgie Friedman is a 78 y.o. female.    Chief Complaint: Follow-up of the Left Knee (Orthovisic injection #2)     Last Surgery: No surgery found  Last Surgery Date: No surgery found    HPI  78-year-old comes in today for her second Orthovisc injection.  The first 1 was helpful to her.    OBJECTIVE: ORTHO EXAM  Left knee    Some bruising over the lateral aspect where I did the injection last time.  This is minimally tender to her.  Minimal effusion.  Tender along the medial joint line.  Her exam otherwise is essentially unchanged from her last visit.    IMAGE RESULTS:  XR knee left 4+ views  Narrative: Interpreted By:  Elysia Antonio,   STUDY:  Left knee, 4 views.      INDICATION:  Signs/Symptoms:pain.      COMPARISON:  09/25/2023.      ACCESSION NUMBER(S):  [id number]      ORDERING CLINICIAN:  LAURYN PERSAUD      FINDINGS:  No acute fracture.  There is mild varus angulation of the knee.  Severe end-stage medial compartment osteoarthrosis with joint space  loss and osteophytes. Moderate lateral and mild patellofemoral  compartment degenerative changes as well. No significant joint  effusion.      Impression: 1. End-stage severe medial and moderate lateral compartment  osteoarthrosis with varus angulation of the left knee. Findings are  not significantly changed when compared with previous radiograph  09/25/2023.      MACRO:  None.      Signed by: Elysia Antonio 12/14/2023 2:39 PM  Dictation workstation:   [id number]      ULTRASOUND      L Inj/Asp: L knee on 3/12/2024 3:23 PM  Indications: pain  Details: ultrasound-guided  lateral approach  Medications: 30 mg hyaluronan 30 mg/2 mL  Procedure, treatment alternatives, risks and benefits explained, specific risks discussed. Consent was given by the patient. Immediately prior to procedure a time out was called to verify the correct patient, procedure, equipment, support staff and site/side marked as required. Patient was prepped and draped in the usual sterile fashion.            Orders Placed This Encounter    Point of Care Ultrasound

## 2024-03-12 NOTE — ASSESSMENT & PLAN NOTE
Assessment: Patient comes in today for her second Orthovisc to the left knee.  The first 1 did help her.    Plan: Orthovisc injection 2 of 3 today.  Follow-up in 1 week for reevaluation with 3 of 3 Orthovisc if she is doing well.

## 2024-03-14 DIAGNOSIS — N18.4 STAGE 4 CHRONIC KIDNEY DISEASE (MULTI): ICD-10-CM

## 2024-03-14 DIAGNOSIS — I10 PRIMARY HYPERTENSION: ICD-10-CM

## 2024-03-14 RX ORDER — NIFEDIPINE 30 MG/1
TABLET, FILM COATED, EXTENDED RELEASE ORAL
Qty: 90 TABLET | Refills: 0 | OUTPATIENT
Start: 2024-03-14

## 2024-03-14 NOTE — TELEPHONE ENCOUNTER
Called patient as this is the wrong dose, she does not need it at this time.  Apparently her insurance changed and there has been some confusion.   Medication Therapy Management (MTM) Encounter    ASSESSMENT:                            Medication Adherence/Access: No issues identified    Type 2 Diabetes:  Needs improvement, not meeting A1c goal <7.5%. Patient has had slight improvement since taking increased dose of Jardiance. Patient refuses increase of Lantus at this time. Patient declines CGM at this time.  Due for annual diabetes eye exam. Encouraged dietary and lifestyle changes as well.     PLAN:                            1. Continue taking medications as prescribed. Focus on dietary and lifestyle changes as well to help lower your blood sugars. We will recheck your A1c during your visit with Dr. Freire at the end of September.     Follow-up: Return in about 3 months (around 11/22/2023) for Follow up.    SUBJECTIVE/OBJECTIVE:                          Maik Hammond is a 74 year old male called for a follow-up visit from 07/25/23 with me.     Reason for visit: MTM.    Allergies/ADRs: Reviewed in chart  Past Medical History: Reviewed in chart  Tobacco: He reports that he has never smoked. He does not have any smokeless tobacco history on file.  Alcohol: none      Medication Adherence/Access: no issues reported.        Type 2 Diabetes:    Patient reports he has only been taking 35 units of Lantus once daily.  Refused to increase to the prescribed amount of 38 units daily. Feels he does not need to make the increase at this time. Discussed risks with keeping A1c and BG elevated for a long time. Patient acknowledged and insisted on keeping Lantus at 35 units daily. He did start the increased dose of Jardiance since our last visit and denies any side effects. Will recheck labs during his PCP visit next month.  Notes his activity level has slightly increased over the summer months as he is spending more time outside.  Offered information about CGM, however patient declines use at this time.  Jardiance 25 mg by mouth daily   Lantus 38 units injected once  daily  metFORMIN HCl  MG two tablets twice daily     glipiZIDE ER 2.5 MG Tablet one daily   Aspirin 81mg daily  Patient is not experiencing side effects.  Blood sugar monitorin time(s) daily; Ranges: (patient reported) Fasting-now under 200s   Current diabetes symptoms: none  Diet/Exercise: notes he has increased his daily activity and is spending more time outside during the summer months.   Eye exam: due  Foot exam: up to date  Urine Albumin:   Lab Results   Component Value Date    UMALCR 16.05 2023   A1c:   11.1%  3/2 10.5%       Today's Vitals: There were no vitals taken for this visit.  ----------------      I spent 5 minutes with this patient today. No changes were made today. A copy of the visit note was provided to the patient's provider(s).    A summary of these recommendations was declined by the patient.    Felisha Casillas, PharmD  Medication Therapy Management Pharmacist      Telemedicine Visit Details  Type of service:  Telephone visit  Start Time:  1:02 PM  End Time:  1:07 PM     Medication Therapy Recommendations  Type 2 diabetes mellitus with complication, without long-term current use of insulin (H)    Current Medication: insulin glargine (LANTUS SOLOSTAR PEN) 100 UNIT/ML pen   Rationale: Does not understand instructions - Adherence - Adherence   Recommendation: Provide Education   Status: Declined per Patient   Note: Patient refused to increase Lantus per PCP's request

## 2024-03-19 ENCOUNTER — OFFICE VISIT (OUTPATIENT)
Dept: ORTHOPEDIC SURGERY | Facility: CLINIC | Age: 79
End: 2024-03-19
Payer: MEDICARE

## 2024-03-19 DIAGNOSIS — M17.12 ARTHRITIS OF KNEE, LEFT: ICD-10-CM

## 2024-03-19 PROCEDURE — 1036F TOBACCO NON-USER: CPT | Performed by: SPECIALIST

## 2024-03-19 PROCEDURE — 1159F MED LIST DOCD IN RCRD: CPT | Performed by: SPECIALIST

## 2024-03-19 PROCEDURE — 1160F RVW MEDS BY RX/DR IN RCRD: CPT | Performed by: SPECIALIST

## 2024-03-19 ASSESSMENT — PAIN - FUNCTIONAL ASSESSMENT: PAIN_FUNCTIONAL_ASSESSMENT: 0-10

## 2024-03-19 ASSESSMENT — PAIN SCALES - GENERAL: PAINLEVEL_OUTOF10: 0 - NO PAIN

## 2024-03-20 PROCEDURE — 20611 DRAIN/INJ JOINT/BURSA W/US: CPT | Performed by: SPECIALIST

## 2024-03-20 RX ORDER — NIFEDIPINE 60 MG/1
60 TABLET, FILM COATED, EXTENDED RELEASE ORAL DAILY
Qty: 90 TABLET | Refills: 3 | Status: SHIPPED | OUTPATIENT
Start: 2024-03-20 | End: 2025-03-20

## 2024-03-20 NOTE — PROGRESS NOTES
Assessment/Plan   Encounter Diagnoses:  Arthritis of knee, left  Arthritis of knee, left  Assessment: Patient comes in today for her third Orthovisc to the left knee.  The first 2 did help her.    Plan: Orthovisc injection 3 of 3 today.  Follow-up in 6 weeks for reevaluation        Subjective    Patient ID: Georgie Friedman is a 78 y.o. female.    Chief Complaint: Pain of the Left Knee (Injection #3/orthovisc)     Last Surgery: No surgery found  Last Surgery Date: No surgery found    HPI  78-year-old comes in today for her third Orthovisc injection.  The first 2 were helpful to her.    OBJECTIVE: ORTHO EXAM  Left knee    Some bruising over the lateral aspect where I did the first injection.  This is resolving and minimally tender to her.  Scant effusion.  Tender along the medial joint line.  Her exam otherwise is essentially unchanged from her last visit.    IMAGE RESULTS:  XR knee left 4+ views  Narrative: Interpreted By:  Elysia Antonio,   STUDY:  Left knee, 4 views.      INDICATION:  Signs/Symptoms:pain.      COMPARISON:  09/25/2023.      ACCESSION NUMBER(S):  [id number]      ORDERING CLINICIAN:  LAURYN PERSAUD      FINDINGS:  No acute fracture.  There is mild varus angulation of the knee.  Severe end-stage medial compartment osteoarthrosis with joint space  loss and osteophytes. Moderate lateral and mild patellofemoral  compartment degenerative changes as well. No significant joint  effusion.      Impression: 1. End-stage severe medial and moderate lateral compartment  osteoarthrosis with varus angulation of the left knee. Findings are  not significantly changed when compared with previous radiograph  09/25/2023.      MACRO:  None.      Signed by: Elysia Antonio 12/14/2023 2:39 PM  Dictation workstation:   [id number]      ULTRASOUND      L Inj/Asp: bilateral knee on 3/20/2024 4:55 AM  Indications: pain  Details: 18 G needle, ultrasound-guided superolateral approach  Medications (Right): 30 mg hyaluronan 30  mg/2 mL  Medications (Left): 30 mg hyaluronan 30 mg/2 mL  Procedure, treatment alternatives, risks and benefits explained, specific risks discussed. Consent was given by the patient. Immediately prior to procedure a time out was called to verify the correct patient, procedure, equipment, support staff and site/side marked as required. Patient was prepped and draped in the usual sterile fashion.            Orders Placed This Encounter    Point of Care Ultrasound

## 2024-03-20 NOTE — ASSESSMENT & PLAN NOTE
Assessment: Patient comes in today for her third Orthovisc to the left knee.  The first 2 did help her.    Plan: Orthovisc injection 3 of 3 today.  Follow-up in 6 weeks for reevaluation

## 2024-04-30 ENCOUNTER — OFFICE VISIT (OUTPATIENT)
Dept: ORTHOPEDIC SURGERY | Facility: CLINIC | Age: 79
End: 2024-04-30
Payer: MEDICARE

## 2024-04-30 ENCOUNTER — HOSPITAL ENCOUNTER (OUTPATIENT)
Dept: RADIOLOGY | Facility: EXTERNAL LOCATION | Age: 79
Discharge: HOME | End: 2024-04-30

## 2024-04-30 DIAGNOSIS — M17.12 ARTHRITIS OF KNEE, LEFT: ICD-10-CM

## 2024-04-30 PROCEDURE — 1160F RVW MEDS BY RX/DR IN RCRD: CPT | Performed by: SPECIALIST

## 2024-04-30 PROCEDURE — 99214 OFFICE O/P EST MOD 30 MIN: CPT | Performed by: SPECIALIST

## 2024-04-30 PROCEDURE — 1036F TOBACCO NON-USER: CPT | Performed by: SPECIALIST

## 2024-04-30 PROCEDURE — 1159F MED LIST DOCD IN RCRD: CPT | Performed by: SPECIALIST

## 2024-04-30 PROCEDURE — 76882 US LMTD JT/FCL EVL NVASC XTR: CPT | Performed by: SPECIALIST

## 2024-04-30 ASSESSMENT — PAIN - FUNCTIONAL ASSESSMENT: PAIN_FUNCTIONAL_ASSESSMENT: 0-10

## 2024-04-30 ASSESSMENT — PAIN SCALES - GENERAL: PAINLEVEL_OUTOF10: 0 - NO PAIN

## 2024-04-30 NOTE — ASSESSMENT & PLAN NOTE
Assessment: Patient comes in today status post her Orthovisc series.  She states she is doing quite well.  She actually laid down mulch in her garden.  She states this did make her knee sore.         Plan: She can advance her activities slowly.  I advised her that activities such as mulching that aggravate her knee should be avoided.      Follow-up in 8 weeks for reevaluation

## 2024-04-30 NOTE — PROGRESS NOTES
Assessment/Plan   Encounter Diagnoses:  Arthritis of knee, left  Arthritis of knee, left  Assessment: Patient comes in today status post her Orthovisc series.  She states she is doing quite well.  She actually laid down mulch in her garden.  She states this did make her knee sore.         Plan: She can advance her activities slowly.  I advised her that activities such as mulching that aggravate her knee should be avoided.      Follow-up in 8 weeks for reevaluation        Subjective    Patient ID: Georgie Friedman is a 78 y.o. female.    Chief Complaint: Follow-up of the Left Knee (FINISHED ORTHO VISC INJECTIONS 3-20-24)     Last Surgery: No surgery found  Last Surgery Date: No surgery found    HPI  78-year-old comes in today for her third Orthovisc injection.  The first 2 were helpful to her.    OBJECTIVE: ORTHO EXAM  Left knee    All of her bruising and swelling has resolved  Scant effusion.  Tender along the medial joint line.  Her exam otherwise is essentially unchanged from her last visit.  She demonstrates full extension and flexion past 110 degrees.  She is ambulating without an antalgic limp.    IMAGE RESULTS:  Point of Care Ultrasound  These images are not reportable by radiology and will not be interpreted   by  Radiologists.      ULTRASOUND  DIAGNOSTIC ULTRASOUND REPORT FINAL: Left KNEE  Sonographer: Phil Minor MD  Indication: Knee Pain  Procedure: Ultrasound, extremity, nonvascular, real-time, COMPLETE, anatomic specific  Technique: B-Mode Ultrasound Examination performed using 6- 9 MHz linear transducer with [employer]  STUDY TYPE:   1. ULTRASOUND EXTREMITY  2. REAL TIME WITH IMAGE DOCUMENTATION  3. NON-VASCULAR  4. COMPLETE STUDY, INCLUDING BUT NOT LIMITED TO MUSCLE, TENDONS, LIGAMENTS, SOFT TISSUES, ADIPOSE TISSUE AND SUBCUTANEOUS TISSUE.  Site: KNEE   Live ultrasound was performed with of patient's  KNEE and PERMANENTLY documented. I personally performed the ultrasound and reviewed the  findings. These show:    Cass-articular evaluation:   An intact Quadriceps Tendon with the Quadriceps Muscle fibers showing normal striations Quadriceps Tendon demonstrating normal fibrillar pattern. . The Patellar Tendon demonstrates normal fibrillar pattern and is intact.   No significant soft tissue fluid collection/abscess appreciated.     Joint Evaluation:  The lateral joint line shows an intact LCL.   Medial joint line exam shows an intact MCL.   The patellar tendon was within normal limits.  Scant joint effusion noted.     The patient tolerated the procedure well.        Procedures     Orders Placed This Encounter    Point of Care Ultrasound

## 2024-05-26 DIAGNOSIS — K21.9 GASTROESOPHAGEAL REFLUX DISEASE, UNSPECIFIED WHETHER ESOPHAGITIS PRESENT: ICD-10-CM

## 2024-05-26 DIAGNOSIS — I10 HYPERTENSION, UNSPECIFIED TYPE: ICD-10-CM

## 2024-05-26 DIAGNOSIS — F33.9 RECURRENT MAJOR DEPRESSIVE DISORDER, REMISSION STATUS UNSPECIFIED (CMS-HCC): ICD-10-CM

## 2024-05-26 DIAGNOSIS — E78.49 OTHER HYPERLIPIDEMIA: ICD-10-CM

## 2024-05-26 DIAGNOSIS — E11.22 TYPE 2 DIABETES MELLITUS WITH CHRONIC KIDNEY DISEASE, WITHOUT LONG-TERM CURRENT USE OF INSULIN, UNSPECIFIED CKD STAGE (MULTI): ICD-10-CM

## 2024-05-28 RX ORDER — ROSUVASTATIN CALCIUM 10 MG/1
10 TABLET, COATED ORAL DAILY
Qty: 90 TABLET | Refills: 3 | Status: SHIPPED | OUTPATIENT
Start: 2024-05-28

## 2024-05-28 RX ORDER — OMEPRAZOLE 40 MG/1
CAPSULE, DELAYED RELEASE ORAL
Qty: 180 CAPSULE | Refills: 3 | Status: SHIPPED | OUTPATIENT
Start: 2024-05-28

## 2024-05-28 RX ORDER — LISINOPRIL 40 MG/1
40 TABLET ORAL DAILY
Qty: 90 TABLET | Refills: 3 | Status: SHIPPED | OUTPATIENT
Start: 2024-05-28

## 2024-05-28 RX ORDER — SERTRALINE HYDROCHLORIDE 50 MG/1
50 TABLET, FILM COATED ORAL DAILY
Qty: 90 TABLET | Refills: 3 | Status: SHIPPED | OUTPATIENT
Start: 2024-05-28

## 2024-05-28 RX ORDER — BLOOD SUGAR DIAGNOSTIC
STRIP MISCELLANEOUS
Qty: 100 STRIP | Refills: 3 | Status: SHIPPED | OUTPATIENT
Start: 2024-05-28

## 2024-05-28 RX ORDER — ISOPROPYL ALCOHOL 70 ML/100ML
SWAB TOPICAL
Qty: 100 EACH | Refills: 3 | Status: SHIPPED | OUTPATIENT
Start: 2024-05-28

## 2024-05-28 RX ORDER — LANCETS
EACH MISCELLANEOUS
Qty: 100 EACH | Refills: 3 | Status: SHIPPED | OUTPATIENT
Start: 2024-05-28

## 2024-07-02 ENCOUNTER — HOSPITAL ENCOUNTER (OUTPATIENT)
Dept: RADIOLOGY | Facility: HOSPITAL | Age: 79
Discharge: HOME | End: 2024-07-02
Payer: MEDICARE

## 2024-07-02 ENCOUNTER — APPOINTMENT (OUTPATIENT)
Dept: ORTHOPEDIC SURGERY | Facility: CLINIC | Age: 79
End: 2024-07-02
Payer: MEDICARE

## 2024-07-02 VITALS — BODY MASS INDEX: 39.27 KG/M2 | WEIGHT: 200 LBS | HEIGHT: 60 IN

## 2024-07-02 DIAGNOSIS — Z12.31 SCREENING MAMMOGRAM FOR BREAST CANCER: ICD-10-CM

## 2024-07-02 PROCEDURE — 77067 SCR MAMMO BI INCL CAD: CPT

## 2024-07-02 PROCEDURE — 77063 BREAST TOMOSYNTHESIS BI: CPT | Performed by: RADIOLOGY

## 2024-07-02 PROCEDURE — 77067 SCR MAMMO BI INCL CAD: CPT | Performed by: RADIOLOGY

## 2024-07-03 ENCOUNTER — HOSPITAL ENCOUNTER (OUTPATIENT)
Dept: RADIOLOGY | Facility: EXTERNAL LOCATION | Age: 79
Discharge: HOME | End: 2024-07-03

## 2024-07-03 ENCOUNTER — APPOINTMENT (OUTPATIENT)
Dept: ORTHOPEDIC SURGERY | Facility: CLINIC | Age: 79
End: 2024-07-03
Payer: MEDICARE

## 2024-07-03 DIAGNOSIS — M17.12 ARTHRITIS OF KNEE, LEFT: ICD-10-CM

## 2024-07-03 PROCEDURE — 1036F TOBACCO NON-USER: CPT | Performed by: SPECIALIST

## 2024-07-03 PROCEDURE — 76882 US LMTD JT/FCL EVL NVASC XTR: CPT | Performed by: SPECIALIST

## 2024-07-03 PROCEDURE — 1160F RVW MEDS BY RX/DR IN RCRD: CPT | Performed by: SPECIALIST

## 2024-07-03 PROCEDURE — 99214 OFFICE O/P EST MOD 30 MIN: CPT | Performed by: SPECIALIST

## 2024-07-03 PROCEDURE — 1159F MED LIST DOCD IN RCRD: CPT | Performed by: SPECIALIST

## 2024-07-03 ASSESSMENT — PAIN - FUNCTIONAL ASSESSMENT: PAIN_FUNCTIONAL_ASSESSMENT: NO/DENIES PAIN

## 2024-07-03 NOTE — ASSESSMENT & PLAN NOTE
Assessment: Patient comes in today status post her Orthovisc series.  She states she is doing quite well.  There is little she cannot do now wrt her knee.    Plan: She can advance her activities slowly.    Follow-up in 3 months for reevaluation

## 2024-07-03 NOTE — PROGRESS NOTES
Assessment/Plan   Encounter Diagnoses:  Arthritis of knee, left  Arthritis of knee, left  Assessment: Patient comes in today status post her Orthovisc series.  She states she is doing quite well.  There is little she cannot do now wrt her knee.    Plan: She can advance her activities slowly.    Follow-up in 3 months for reevaluation        Subjective    Patient ID: Georgie Friedman is a 78 y.o. female.    Chief Complaint: Follow-up of the Left Knee     Last Surgery: No surgery found  Last Surgery Date: No surgery found    HPI  78-year-old comes in today for her third Orthovisc injection.  The first 2 were helpful to her.    OBJECTIVE: ORTHO EXAM  Left knee    All of her bruising and swelling has resolved  Scant effusion.  Min. Tender along the medial joint line.  Her exam otherwise is essentially unchanged from her last visit.  She demonstrates full extension and flexion past 110 degrees.  She is ambulating without an antalgic limp.    IMAGE RESULTS:  Point of Care Ultrasound  These images are not reportable by radiology and will not be interpreted   by  Radiologists.      ULTRASOUND  DIAGNOSTIC ULTRASOUND REPORT FINAL: Left KNEE  Sonographer: Phil Minor MD  Indication: Knee Pain  Procedure: Ultrasound, extremity, nonvascular, real-time, COMPLETE, anatomic specific  Technique: B-Mode Ultrasound Examination performed using 6- 9 MHz linear transducer with [employer]  STUDY TYPE:   1. ULTRASOUND EXTREMITY  2. REAL TIME WITH IMAGE DOCUMENTATION  3. NON-VASCULAR  4. COMPLETE STUDY, INCLUDING BUT NOT LIMITED TO MUSCLE, TENDONS, LIGAMENTS, SOFT TISSUES, ADIPOSE TISSUE AND SUBCUTANEOUS TISSUE.  Site: KNEE   Live ultrasound was performed with of patient's  KNEE and PERMANENTLY documented. I personally performed the ultrasound and reviewed the findings. These show:    Cass-articular evaluation:   An intact Quadriceps Tendon with the Quadriceps Muscle fibers showing normal striations Quadriceps Tendon demonstrating  normal fibrillar pattern. . The Patellar Tendon demonstrates normal fibrillar pattern and is intact.   No significant soft tissue fluid collection/abscess appreciated.     Joint Evaluation:  The lateral joint line shows an intact LCL.   Medial joint line exam shows an intact MCL.   The patellar tendon was within normal limits.  min joint effusion noted.     The patient tolerated the procedure well.        Procedures     Orders Placed This Encounter    Point of Care Ultrasound

## 2024-08-13 ENCOUNTER — LAB (OUTPATIENT)
Dept: LAB | Facility: LAB | Age: 79
End: 2024-08-13
Payer: MEDICARE

## 2024-08-13 DIAGNOSIS — N18.4 STAGE 4 CHRONIC KIDNEY DISEASE (MULTI): ICD-10-CM

## 2024-08-13 DIAGNOSIS — N18.4 TYPE 2 DIABETES MELLITUS WITH STAGE 4 CHRONIC KIDNEY DISEASE, WITHOUT LONG-TERM CURRENT USE OF INSULIN (MULTI): ICD-10-CM

## 2024-08-13 DIAGNOSIS — Z11.59 NEED FOR HEPATITIS C SCREENING TEST: ICD-10-CM

## 2024-08-13 DIAGNOSIS — E11.22 TYPE 2 DIABETES MELLITUS WITH STAGE 4 CHRONIC KIDNEY DISEASE, WITHOUT LONG-TERM CURRENT USE OF INSULIN (MULTI): ICD-10-CM

## 2024-08-13 LAB
ANION GAP SERPL CALC-SCNC: 12 MMOL/L (ref 10–20)
APPEARANCE UR: CLEAR
BACTERIA #/AREA URNS AUTO: ABNORMAL /HPF
BASOPHILS # BLD AUTO: 0.03 X10*3/UL (ref 0–0.1)
BASOPHILS NFR BLD AUTO: 0.4 %
BILIRUB UR STRIP.AUTO-MCNC: NEGATIVE MG/DL
BUN SERPL-MCNC: 26 MG/DL (ref 6–23)
CALCIUM SERPL-MCNC: 8.8 MG/DL (ref 8.6–10.3)
CHLORIDE SERPL-SCNC: 107 MMOL/L (ref 98–107)
CO2 SERPL-SCNC: 26 MMOL/L (ref 21–32)
COLOR UR: ABNORMAL
CREAT SERPL-MCNC: 1.61 MG/DL (ref 0.5–1.05)
EGFRCR SERPLBLD CKD-EPI 2021: 33 ML/MIN/1.73M*2
EOSINOPHIL # BLD AUTO: 0.09 X10*3/UL (ref 0–0.4)
EOSINOPHIL NFR BLD AUTO: 1.2 %
ERYTHROCYTE [DISTWIDTH] IN BLOOD BY AUTOMATED COUNT: 13.8 % (ref 11.5–14.5)
EST. AVERAGE GLUCOSE BLD GHB EST-MCNC: 117 MG/DL
GLUCOSE SERPL-MCNC: 96 MG/DL (ref 74–99)
GLUCOSE UR STRIP.AUTO-MCNC: NORMAL MG/DL
HBA1C MFR BLD: 5.7 %
HCT VFR BLD AUTO: 36.6 % (ref 36–46)
HCV AB SER QL: NONREACTIVE
HGB BLD-MCNC: 11.5 G/DL (ref 12–16)
IMM GRANULOCYTES # BLD AUTO: 0.04 X10*3/UL (ref 0–0.5)
IMM GRANULOCYTES NFR BLD AUTO: 0.5 % (ref 0–0.9)
KETONES UR STRIP.AUTO-MCNC: NEGATIVE MG/DL
LEUKOCYTE ESTERASE UR QL STRIP.AUTO: NEGATIVE
LYMPHOCYTES # BLD AUTO: 1.3 X10*3/UL (ref 0.8–3)
LYMPHOCYTES NFR BLD AUTO: 17.3 %
MCH RBC QN AUTO: 28.9 PG (ref 26–34)
MCHC RBC AUTO-ENTMCNC: 31.4 G/DL (ref 32–36)
MCV RBC AUTO: 92 FL (ref 80–100)
MONOCYTES # BLD AUTO: 0.53 X10*3/UL (ref 0.05–0.8)
MONOCYTES NFR BLD AUTO: 7 %
MUCOUS THREADS #/AREA URNS AUTO: ABNORMAL /LPF
NEUTROPHILS # BLD AUTO: 5.53 X10*3/UL (ref 1.6–5.5)
NEUTROPHILS NFR BLD AUTO: 73.6 %
NITRITE UR QL STRIP.AUTO: NEGATIVE
NRBC BLD-RTO: 0 /100 WBCS (ref 0–0)
PH UR STRIP.AUTO: 6 [PH]
PLATELET # BLD AUTO: 301 X10*3/UL (ref 150–450)
POTASSIUM SERPL-SCNC: 4.8 MMOL/L (ref 3.5–5.3)
PROT UR STRIP.AUTO-MCNC: NEGATIVE MG/DL
RBC # BLD AUTO: 3.98 X10*6/UL (ref 4–5.2)
RBC # UR STRIP.AUTO: ABNORMAL /UL
RBC #/AREA URNS AUTO: ABNORMAL /HPF
SODIUM SERPL-SCNC: 140 MMOL/L (ref 136–145)
SP GR UR STRIP.AUTO: 1.01
SQUAMOUS #/AREA URNS AUTO: ABNORMAL /HPF
UROBILINOGEN UR STRIP.AUTO-MCNC: NORMAL MG/DL
WBC # BLD AUTO: 7.5 X10*3/UL (ref 4.4–11.3)
WBC #/AREA URNS AUTO: ABNORMAL /HPF
YEAST BUDDING #/AREA UR COMP ASSIST: PRESENT /HPF

## 2024-08-13 PROCEDURE — 83036 HEMOGLOBIN GLYCOSYLATED A1C: CPT

## 2024-08-13 PROCEDURE — 86803 HEPATITIS C AB TEST: CPT

## 2024-08-13 PROCEDURE — 85025 COMPLETE CBC W/AUTO DIFF WBC: CPT

## 2024-08-13 PROCEDURE — 80048 BASIC METABOLIC PNL TOTAL CA: CPT

## 2024-08-13 PROCEDURE — 36415 COLL VENOUS BLD VENIPUNCTURE: CPT

## 2024-08-13 PROCEDURE — 81001 URINALYSIS AUTO W/SCOPE: CPT

## 2024-08-14 ENCOUNTER — OFFICE VISIT (OUTPATIENT)
Age: 79
End: 2024-08-14
Payer: MEDICARE

## 2024-08-14 VITALS
HEIGHT: 60 IN | WEIGHT: 201 LBS | HEART RATE: 84 BPM | BODY MASS INDEX: 39.46 KG/M2 | SYSTOLIC BLOOD PRESSURE: 124 MMHG | DIASTOLIC BLOOD PRESSURE: 74 MMHG

## 2024-08-14 DIAGNOSIS — B34.9 VIRAL SYNDROME: ICD-10-CM

## 2024-08-14 DIAGNOSIS — R05.1 ACUTE COUGH: Primary | ICD-10-CM

## 2024-08-14 LAB
POC RAPID INFLUENZA A: NEGATIVE
POC RAPID INFLUENZA B: NEGATIVE

## 2024-08-14 RX ORDER — PREDNISONE 20 MG/1
20 TABLET ORAL DAILY
Qty: 5 TABLET | Refills: 0 | Status: SHIPPED | OUTPATIENT
Start: 2024-08-14 | End: 2024-08-19

## 2024-08-14 RX ORDER — BENZONATATE 100 MG/1
100 CAPSULE ORAL 3 TIMES DAILY PRN
Qty: 30 CAPSULE | Refills: 0 | Status: SHIPPED | OUTPATIENT
Start: 2024-08-14

## 2024-08-14 NOTE — PROGRESS NOTES
Subjective  Georgie Friedman is a 78 y.o. female who presents for Cough.  HPI  Sudden onset headache, cough, congestion, body aches started Sunday.  Taking zicam and jaime seltzer cold and flu  Not short of breath, some nausea, no vomiting.  Some diarrhea.  CKD, gfr yesterday 33    Review of Systems   All other systems reviewed and are negative.  .  Patient Active Problem List   Diagnosis    Acid reflux    Anemia    Diabetes mellitus with chronic kidney disease, without long-term current use of insulin (Multi)    Other hyperlipidemia    Hypertension    Hypomagnesemia    Major depression, recurrent (CMS-HCC)    Osteopenia    Severe sleep apnea    Stage 4 chronic kidney disease (Multi)    Arthritis of knee, left     Current Outpatient Medications:     Accu-Chek Guide test strips strip, TEST BLOOD SUGAR ONE TIME DAILY, Disp: 100 strip, Rfl: 3    Accu-Chek Softclix Lancets misc, TEST BLOOD SUGAR ONE TIME DAILY, Disp: 100 each, Rfl: 3    aspirin 81 mg EC tablet, Take 1 tablet (81 mg) by mouth once daily., Disp: , Rfl:     blood-glucose meter (Accu-Chek Guide Me Glucose Mtr) misc, Inject 1 kit under the skin once daily., Disp: 1 each, Rfl: 0    calcium carbonate-vitamin D3 (Calcium 500 + D) 500 mg-10 mcg (400 unit) chewable tablet, Chew 1 tablet once daily., Disp: , Rfl:     cyanocobalamin (Vitamin B-12) 1,000 mcg tablet, Take 1 tablet (1,000 mcg) by mouth once daily., Disp: , Rfl:     diclofenac sodium (Voltaren) 1 % gel gel, Apply 1 Application topically 4 times a day as needed (Pain). Apply 2-4 g topically 4 times a day as needed for Pain, Disp: 100 g, Rfl: 1    diclofenac sodium (Voltaren) 1 % gel, Apply 4.5 inches (4 g) topically 4 times a day as needed (PAIN)., Disp: 100 g, Rfl: 1    [employer] Alcohol Prep Pads pads, medicated, USE ONE TIME DAILY AS DIRECTED, Disp: 100 each, Rfl: 3    FERROUS GLUCONATE ORAL, Take 45 mg by mouth once daily., Disp: , Rfl:     lisinopril 40 mg tablet, TAKE 1 TABLET ONE TIME DAILY,  Disp: 90 tablet, Rfl: 3    magnesium oxide (Mag-Ox) 400 mg tablet, 1 tablet (400 mg) once daily., Disp: , Rfl:     multivitamin tablet, Take by mouth., Disp: , Rfl:     NIFEdipine ER (Adalat CC) 60 mg 24 hr tablet, Take 1 tablet (60 mg) by mouth once daily., Disp: 90 tablet, Rfl: 3    omeprazole (PriLOSEC) 40 mg DR capsule, TAKE 1 CAPSULE TWICE DAILY BEFORE MEALS, Disp: 180 capsule, Rfl: 3    rosuvastatin (Crestor) 10 mg tablet, TAKE 1 TABLET ONE TIME DAILY, Disp: 90 tablet, Rfl: 3    sertraline (Zoloft) 50 mg tablet, TAKE 1 TABLET ONE TIME DAILY, Disp: 90 tablet, Rfl: 3    benzonatate (Tessalon) 100 mg capsule, Take 1 capsule (100 mg) by mouth 3 times a day as needed for cough. Do not crush or chew., Disp: 30 capsule, Rfl: 0    empagliflozin (Jardiance) 10 mg, Take 1 tablet (10 mg) by mouth once daily., Disp: 30 tablet, Rfl: 5    predniSONE (Deltasone) 20 mg tablet, Take 1 tablet (20 mg) by mouth once daily for 5 days., Disp: 5 tablet, Rfl: 0       Objective     Visit Vitals  /74 (BP Location: Left arm, Patient Position: Sitting)   Pulse 84      Physical Exam  Constitutional:       Appearance: She is ill-appearing.   HENT:      Head: Normocephalic.   Eyes:      General: No scleral icterus.  Cardiovascular:      Rate and Rhythm: Normal rate and regular rhythm.   Pulmonary:      Effort: Pulmonary effort is normal. No respiratory distress.      Breath sounds: Normal breath sounds. No wheezing, rhonchi or rales.   Abdominal:      Tenderness: There is no abdominal tenderness.   Skin:     Coloration: Skin is not pale.   Neurological:      General: No focal deficit present.      Mental Status: She is alert.   Psychiatric:         Mood and Affect: Mood normal.         Assessment/Plan   Problem List Items Addressed This Visit    None  Visit Diagnoses       Acute cough    -  Primary    Relevant Medications    predniSONE (Deltasone) 20 mg tablet    benzonatate (Tessalon) 100 mg capsule    Other Relevant Orders    POCT  Influenza A/B manually resulted (Completed)    Viral syndrome        Relevant Medications    predniSONE (Deltasone) 20 mg tablet    benzonatate (Tessalon) 100 mg capsule    Other Relevant Orders    POCT Influenza A/B manually resulted (Completed)           Flu negative  Discussed covid testing,not a candidate for oral treatment based on gfr, so opted to not test and treat symptoms, call concerns.  Side effects discussed.      Lindsey England MD

## 2024-08-15 ENCOUNTER — APPOINTMENT (OUTPATIENT)
Dept: NEPHROLOGY | Facility: CLINIC | Age: 79
End: 2024-08-15
Payer: MEDICARE

## 2024-08-20 ENCOUNTER — APPOINTMENT (OUTPATIENT)
Dept: PRIMARY CARE | Facility: CLINIC | Age: 79
End: 2024-08-20
Payer: MEDICARE

## 2024-08-20 VITALS
SYSTOLIC BLOOD PRESSURE: 136 MMHG | DIASTOLIC BLOOD PRESSURE: 84 MMHG | BODY MASS INDEX: 38.68 KG/M2 | WEIGHT: 197 LBS | OXYGEN SATURATION: 96 % | HEART RATE: 88 BPM | HEIGHT: 60 IN

## 2024-08-20 DIAGNOSIS — R05.1 ACUTE COUGH: ICD-10-CM

## 2024-08-20 DIAGNOSIS — B34.9 VIRAL SYNDROME: ICD-10-CM

## 2024-08-20 DIAGNOSIS — N18.31 TYPE 2 DIABETES MELLITUS WITH STAGE 3A CHRONIC KIDNEY DISEASE, WITHOUT LONG-TERM CURRENT USE OF INSULIN (MULTI): ICD-10-CM

## 2024-08-20 DIAGNOSIS — J98.01 COUGH DUE TO BRONCHOSPASM: Primary | ICD-10-CM

## 2024-08-20 DIAGNOSIS — E11.22 TYPE 2 DIABETES MELLITUS WITH STAGE 3A CHRONIC KIDNEY DISEASE, WITHOUT LONG-TERM CURRENT USE OF INSULIN (MULTI): ICD-10-CM

## 2024-08-20 PROBLEM — M17.12 ARTHRITIS OF KNEE, LEFT: Status: RESOLVED | Noted: 2023-10-06 | Resolved: 2024-08-20

## 2024-08-20 PROCEDURE — 3075F SYST BP GE 130 - 139MM HG: CPT | Performed by: FAMILY MEDICINE

## 2024-08-20 PROCEDURE — 1159F MED LIST DOCD IN RCRD: CPT | Performed by: FAMILY MEDICINE

## 2024-08-20 PROCEDURE — 1160F RVW MEDS BY RX/DR IN RCRD: CPT | Performed by: FAMILY MEDICINE

## 2024-08-20 PROCEDURE — 1036F TOBACCO NON-USER: CPT | Performed by: FAMILY MEDICINE

## 2024-08-20 PROCEDURE — 3079F DIAST BP 80-89 MM HG: CPT | Performed by: FAMILY MEDICINE

## 2024-08-20 PROCEDURE — 99214 OFFICE O/P EST MOD 30 MIN: CPT | Performed by: FAMILY MEDICINE

## 2024-08-20 PROCEDURE — G2211 COMPLEX E/M VISIT ADD ON: HCPCS | Performed by: FAMILY MEDICINE

## 2024-08-20 RX ORDER — ALBUTEROL SULFATE 0.63 MG/3ML
0.63 SOLUTION RESPIRATORY (INHALATION) EVERY 6 HOURS PRN
Qty: 75 ML | Refills: 11 | Status: SHIPPED | OUTPATIENT
Start: 2024-08-20 | End: 2025-08-20

## 2024-08-20 RX ORDER — ALBUTEROL SULFATE 0.63 MG/3ML
0.63 SOLUTION RESPIRATORY (INHALATION) EVERY 6 HOURS PRN
Qty: 75 ML | Refills: 11 | Status: SHIPPED | OUTPATIENT
Start: 2024-08-20 | End: 2024-08-20 | Stop reason: SDUPTHER

## 2024-08-20 RX ORDER — PREDNISONE 20 MG/1
20 TABLET ORAL DAILY
Qty: 5 TABLET | Refills: 0 | Status: SHIPPED | OUTPATIENT
Start: 2024-08-20 | End: 2024-08-25

## 2024-08-20 RX ORDER — PREDNISONE 20 MG/1
20 TABLET ORAL DAILY
Qty: 5 TABLET | Refills: 0 | Status: SHIPPED | OUTPATIENT
Start: 2024-08-20 | End: 2024-08-20 | Stop reason: SDUPTHER

## 2024-08-20 RX ORDER — AZITHROMYCIN 500 MG/1
500 TABLET, FILM COATED ORAL DAILY
Qty: 5 TABLET | Refills: 0 | Status: SHIPPED | OUTPATIENT
Start: 2024-08-20 | End: 2024-08-25

## 2024-08-20 RX ORDER — ONDANSETRON 8 MG/1
8 TABLET, ORALLY DISINTEGRATING ORAL EVERY 8 HOURS PRN
Qty: 20 TABLET | Refills: 1 | Status: SHIPPED | OUTPATIENT
Start: 2024-08-20

## 2024-08-20 RX ORDER — AZITHROMYCIN 500 MG/1
500 TABLET, FILM COATED ORAL DAILY
Qty: 5 TABLET | Refills: 0 | Status: SHIPPED | OUTPATIENT
Start: 2024-08-20 | End: 2024-08-20 | Stop reason: SDUPTHER

## 2024-08-20 NOTE — PROGRESS NOTES
Patient presents for periodic surveillance of chronic medical problems.     Subjective  Georgie Friedman is a 78 y.o. female who presents for Follow-up.  HPI  DM, excellent control, A1c 5.7  CKD, follows nephrology , stable  HTN, stable  Acute illness, see previous note, states is about 30 percent  better.,she felt  better on the prednisone.  No vomiting for two days, still nauseous.  Cough worse at night, has nebulizer from when she had pneumonia but no meds for it.  No fevers, not short of breath, feels better, not back to normal.   Review of Systems   All other systems reviewed and are negative.  .    Objective     Visit Vitals  /84 (BP Location: Left arm, Patient Position: Sitting)   Pulse 88      Physical Exam  Vitals reviewed.   Constitutional:       Appearance: She is not ill-appearing.   Cardiovascular:      Rate and Rhythm: Normal rate and regular rhythm.   Pulmonary:      Effort: Pulmonary effort is normal. No respiratory distress.      Breath sounds: Normal breath sounds.   Skin:     General: Skin is warm and dry.   Neurological:      General: No focal deficit present.      Mental Status: She is alert.   Psychiatric:         Mood and Affect: Mood normal.       Current Outpatient Medications:     Accu-Chek Guide test strips strip, TEST BLOOD SUGAR ONE TIME DAILY, Disp: 100 strip, Rfl: 3    Accu-Chek Softclix Lancets misc, TEST BLOOD SUGAR ONE TIME DAILY, Disp: 100 each, Rfl: 3    aspirin 81 mg EC tablet, Take 1 tablet (81 mg) by mouth once daily., Disp: , Rfl:     benzonatate (Tessalon) 100 mg capsule, Take 1 capsule (100 mg) by mouth 3 times a day as needed for cough. Do not crush or chew., Disp: 30 capsule, Rfl: 0    blood-glucose meter (Accu-Chek Guide Me Glucose Mtr) misc, Inject 1 kit under the skin once daily., Disp: 1 each, Rfl: 0    calcium carbonate-vitamin D3 (Calcium 500 + D) 500 mg-10 mcg (400 unit) chewable tablet, Chew 1 tablet once daily., Disp: , Rfl:     cyanocobalamin (Vitamin  B-12) 1,000 mcg tablet, Take 1 tablet (1,000 mcg) by mouth once daily., Disp: , Rfl:     diclofenac sodium (Voltaren) 1 % gel gel, Apply 1 Application topically 4 times a day as needed (Pain). Apply 2-4 g topically 4 times a day as needed for Pain, Disp: 100 g, Rfl: 1    diclofenac sodium (Voltaren) 1 % gel, Apply 4.5 inches (4 g) topically 4 times a day as needed (PAIN)., Disp: 100 g, Rfl: 1    DropSafe Alcohol Prep Pads pads, medicated, USE ONE TIME DAILY AS DIRECTED, Disp: 100 each, Rfl: 3    FERROUS GLUCONATE ORAL, Take 45 mg by mouth once daily., Disp: , Rfl:     lisinopril 40 mg tablet, TAKE 1 TABLET ONE TIME DAILY, Disp: 90 tablet, Rfl: 3    magnesium oxide (Mag-Ox) 400 mg tablet, 1 tablet (400 mg) once daily., Disp: , Rfl:     multivitamin tablet, Take by mouth., Disp: , Rfl:     NIFEdipine ER (Adalat CC) 60 mg 24 hr tablet, Take 1 tablet (60 mg) by mouth once daily., Disp: 90 tablet, Rfl: 3    omeprazole (PriLOSEC) 40 mg DR capsule, TAKE 1 CAPSULE TWICE DAILY BEFORE MEALS, Disp: 180 capsule, Rfl: 3    rosuvastatin (Crestor) 10 mg tablet, TAKE 1 TABLET ONE TIME DAILY, Disp: 90 tablet, Rfl: 3    sertraline (Zoloft) 50 mg tablet, TAKE 1 TABLET ONE TIME DAILY, Disp: 90 tablet, Rfl: 3    albuterol 0.63 mg/3 mL nebulizer solution, Take 3 mL (0.63 mg) by nebulization every 6 hours if needed for wheezing., Disp: 75 mL, Rfl: 11    azithromycin (Zithromax) 500 mg tablet, Take 1 tablet (500 mg) by mouth once daily for 5 days., Disp: 5 tablet, Rfl: 0    empagliflozin (Jardiance) 10 mg, Take 1 tablet (10 mg) by mouth once daily., Disp: 30 tablet, Rfl: 5    ondansetron ODT (Zofran-ODT) 8 mg disintegrating tablet, Take 1 tablet (8 mg) by mouth every 8 hours if needed for nausea or vomiting., Disp: 20 tablet, Rfl: 1    predniSONE (Deltasone) 20 mg tablet, Take 1 tablet (20 mg) by mouth once daily for 5 days., Disp: 5 tablet, Rfl: 0   Patient Active Problem List   Diagnosis    Acid reflux    Anemia    Diabetes mellitus with  chronic kidney disease, without long-term current use of insulin (Multi)    Other hyperlipidemia    Hypertension    Hypomagnesemia    Major depression, recurrent (CMS-HCC)    Osteopenia    Severe sleep apnea    Stage 4 chronic kidney disease (Multi)       Office Visit on 08/14/2024   Component Date Value Ref Range Status    POC Rapid Influenza A 08/14/2024 Negative  Negative Final    POC Rapid Influenza B 08/14/2024 Negative  Negative Final   Lab on 08/13/2024   Component Date Value Ref Range Status    Glucose 08/13/2024 96  74 - 99 mg/dL Final    Sodium 08/13/2024 140  136 - 145 mmol/L Final    Potassium 08/13/2024 4.8  3.5 - 5.3 mmol/L Final    Chloride 08/13/2024 107  98 - 107 mmol/L Final    Bicarbonate 08/13/2024 26  21 - 32 mmol/L Final    Anion Gap 08/13/2024 12  10 - 20 mmol/L Final    Urea Nitrogen 08/13/2024 26 (H)  6 - 23 mg/dL Final    Creatinine 08/13/2024 1.61 (H)  0.50 - 1.05 mg/dL Final    eGFR 08/13/2024 33 (L)  >60 mL/min/1.73m*2 Final    Calculations of estimated GFR are performed using the 2021 CKD-EPI Study Refit equation without the race variable for the IDMS-Traceable creatinine methods.  https://jasn.asnjournals.org/content/early/2021/09/22/ASN.6775719299    Calcium 08/13/2024 8.8  8.6 - 10.3 mg/dL Final    WBC 08/13/2024 7.5  4.4 - 11.3 x10*3/uL Final    nRBC 08/13/2024 0.0  0.0 - 0.0 /100 WBCs Final    RBC 08/13/2024 3.98 (L)  4.00 - 5.20 x10*6/uL Final    Hemoglobin 08/13/2024 11.5 (L)  12.0 - 16.0 g/dL Final    Hematocrit 08/13/2024 36.6  36.0 - 46.0 % Final    MCV 08/13/2024 92  80 - 100 fL Final    MCH 08/13/2024 28.9  26.0 - 34.0 pg Final    MCHC 08/13/2024 31.4 (L)  32.0 - 36.0 g/dL Final    RDW 08/13/2024 13.8  11.5 - 14.5 % Final    Platelets 08/13/2024 301  150 - 450 x10*3/uL Final    Neutrophils % 08/13/2024 73.6  40.0 - 80.0 % Final    Immature Granulocytes %, Automated 08/13/2024 0.5  0.0 - 0.9 % Final    Immature Granulocyte Count (IG) includes promyelocytes, myelocytes and  metamyelocytes but does not include bands. Percent differential counts (%) should be interpreted in the context of the absolute cell counts (cells/UL).    Lymphocytes % 08/13/2024 17.3  13.0 - 44.0 % Final    Monocytes % 08/13/2024 7.0  2.0 - 10.0 % Final    Eosinophils % 08/13/2024 1.2  0.0 - 6.0 % Final    Basophils % 08/13/2024 0.4  0.0 - 2.0 % Final    Neutrophils Absolute 08/13/2024 5.53 (H)  1.60 - 5.50 x10*3/uL Final    Percent differential counts (%) should be interpreted in the context of the absolute cell counts (cells/uL).    Immature Granulocytes Absolute, Au* 08/13/2024 0.04  0.00 - 0.50 x10*3/uL Final    Lymphocytes Absolute 08/13/2024 1.30  0.80 - 3.00 x10*3/uL Final    Monocytes Absolute 08/13/2024 0.53  0.05 - 0.80 x10*3/uL Final    Eosinophils Absolute 08/13/2024 0.09  0.00 - 0.40 x10*3/uL Final    Basophils Absolute 08/13/2024 0.03  0.00 - 0.10 x10*3/uL Final    Hemoglobin A1C 08/13/2024 5.7 (H)  see below % Final    Estimated Average Glucose 08/13/2024 117  Not Established mg/dL Final    Hepatitis C AB 08/13/2024 Nonreactive  Nonreactive Final    Results from patients taking biotin supplements or receiving high-dose biotin therapy should be interpreted with caution due to possible interference with this test. Providers may contact their local laboratory for further information.    WBC, Urine 08/13/2024 1-5  1-5, NONE /HPF Final    RBC, Urine 08/13/2024 1-2  NONE, 1-2, 3-5 /HPF Final    Squamous Epithelial Cells, Urine 08/13/2024 1-9 (SPARSE)  Reference range not established. /HPF Final    Bacteria, Urine 08/13/2024 1+ (A)  NONE SEEN /HPF Final    Budding Yeast, Urine 08/13/2024 PRESENT (A)  NONE /HPF Final    Mucus, Urine 08/13/2024 FEW  Reference range not established. /LPF Final    Color, Urine 08/13/2024 Light-Yellow  Light-Yellow, Yellow, Dark-Yellow Final    Appearance, Urine 08/13/2024 Clear  Clear Final    Specific Gravity, Urine 08/13/2024 1.013  1.005 - 1.035 Final    pH, Urine  08/13/2024 6.0  5.0, 5.5, 6.0, 6.5, 7.0, 7.5, 8.0 Final    Protein, Urine 08/13/2024 NEGATIVE  NEGATIVE, 10 (TRACE), 20 (TRACE) mg/dL Final    Glucose, Urine 08/13/2024 Normal  Normal mg/dL Final    Blood, Urine 08/13/2024 0.03 (TRACE) (A)  NEGATIVE Final    Ketones, Urine 08/13/2024 NEGATIVE  NEGATIVE mg/dL Final    Bilirubin, Urine 08/13/2024 NEGATIVE  NEGATIVE Final    Urobilinogen, Urine 08/13/2024 Normal  Normal mg/dL Final    Nitrite, Urine 08/13/2024 NEGATIVE  NEGATIVE Final    Leukocyte Esterase, Urine 08/13/2024 NEGATIVE  NEGATIVE Final         Assessment/Plan   Problem List Items Addressed This Visit       Diabetes mellitus with chronic kidney disease, without long-term current use of insulin (Multi)    Relevant Orders    Hemoglobin A1C    Lipid Panel     Other Visit Diagnoses       Cough due to bronchospasm    -  Primary    Relevant Medications    albuterol 0.63 mg/3 mL nebulizer solution    azithromycin (Zithromax) 500 mg tablet    Acute cough        Relevant Medications    predniSONE (Deltasone) 20 mg tablet    Viral syndrome        Relevant Medications    ondansetron ODT (Zofran-ODT) 8 mg disintegrating tablet    predniSONE (Deltasone) 20 mg tablet               Call if not improving, routine follow up six months, labs prior.  Call concerns.      Lindsey England MD

## 2024-09-03 ENCOUNTER — APPOINTMENT (OUTPATIENT)
Dept: NEPHROLOGY | Facility: CLINIC | Age: 79
End: 2024-09-03
Payer: MEDICARE

## 2024-09-03 VITALS
SYSTOLIC BLOOD PRESSURE: 122 MMHG | HEIGHT: 60 IN | BODY MASS INDEX: 39.54 KG/M2 | HEART RATE: 82 BPM | DIASTOLIC BLOOD PRESSURE: 74 MMHG | WEIGHT: 201.4 LBS

## 2024-09-03 DIAGNOSIS — I10 PRIMARY HYPERTENSION: ICD-10-CM

## 2024-09-03 DIAGNOSIS — N18.32 TYPE 2 DIABETES MELLITUS WITH STAGE 3B CHRONIC KIDNEY DISEASE, WITHOUT LONG-TERM CURRENT USE OF INSULIN (MULTI): ICD-10-CM

## 2024-09-03 DIAGNOSIS — N18.32 STAGE 3B CHRONIC KIDNEY DISEASE (MULTI): Primary | ICD-10-CM

## 2024-09-03 DIAGNOSIS — E11.22 TYPE 2 DIABETES MELLITUS WITH STAGE 3B CHRONIC KIDNEY DISEASE, WITHOUT LONG-TERM CURRENT USE OF INSULIN (MULTI): ICD-10-CM

## 2024-09-03 PROBLEM — N18.30 CHRONIC KIDNEY DISEASE (CKD), STAGE III (MODERATE) (MULTI): Status: ACTIVE | Noted: 2024-09-03

## 2024-09-03 PROCEDURE — 3074F SYST BP LT 130 MM HG: CPT | Performed by: CLINICAL NURSE SPECIALIST

## 2024-09-03 PROCEDURE — 99213 OFFICE O/P EST LOW 20 MIN: CPT | Performed by: CLINICAL NURSE SPECIALIST

## 2024-09-03 PROCEDURE — 1160F RVW MEDS BY RX/DR IN RCRD: CPT | Performed by: CLINICAL NURSE SPECIALIST

## 2024-09-03 PROCEDURE — 1036F TOBACCO NON-USER: CPT | Performed by: CLINICAL NURSE SPECIALIST

## 2024-09-03 PROCEDURE — 1159F MED LIST DOCD IN RCRD: CPT | Performed by: CLINICAL NURSE SPECIALIST

## 2024-09-03 PROCEDURE — 3078F DIAST BP <80 MM HG: CPT | Performed by: CLINICAL NURSE SPECIALIST

## 2024-09-03 ASSESSMENT — ENCOUNTER SYMPTOMS
CONSTITUTIONAL NEGATIVE: 1
RESPIRATORY NEGATIVE: 1
ENDOCRINE NEGATIVE: 1
PSYCHIATRIC NEGATIVE: 1
GASTROINTESTINAL NEGATIVE: 1
NEUROLOGICAL NEGATIVE: 1
CARDIOVASCULAR NEGATIVE: 1
MUSCULOSKELETAL NEGATIVE: 1

## 2024-09-03 NOTE — PROGRESS NOTES
Subjective   Patient ID: Georgie Friedman is a 78 y.o. female who presents for Follow-up (6 month ck/Review labs 8/13).  Patient being seen in follow-up for chronic kidney disease stage IV    Labs reviewed  Urinalysis essentially within normal limits, micro with 1+ bacteria and a few budding yeast  Hemoglobin A1c 5.7  H&H 11.5 and 36.6  Glucose 96  Sodium 140, potassium 4.8, chloride 107, bicarb 26  Renal function with BUN of 26 and creatinine 1.61, GFR is 33    She is doing well  She states that she had COVID about 1 month ago and was very ill with this with headache, shortness of breath, cough, nausea and diarrhea, she has recovered  Her blood sugars have been well-controlled  She is voiding without difficulties  She has no edema        Review of Systems   Constitutional: Negative.    Respiratory: Negative.     Cardiovascular: Negative.    Gastrointestinal: Negative.    Endocrine: Negative.    Genitourinary: Negative.    Musculoskeletal: Negative.    Skin: Negative.    Neurological: Negative.    Psychiatric/Behavioral: Negative.         Objective   Physical Exam  Vitals reviewed.   Constitutional:       Appearance: Normal appearance.   HENT:      Head: Normocephalic.   Cardiovascular:      Rate and Rhythm: Normal rate and regular rhythm.   Pulmonary:      Effort: Pulmonary effort is normal.      Breath sounds: Normal breath sounds.   Abdominal:      Palpations: Abdomen is soft.   Musculoskeletal:         General: Normal range of motion.   Skin:     General: Skin is warm and dry.   Neurological:      Mental Status: She is alert and oriented to person, place, and time.   Psychiatric:         Mood and Affect: Mood normal.         Behavior: Behavior normal.         Assessment/Plan   Problem List Items Addressed This Visit             ICD-10-CM    Diabetes mellitus with chronic kidney disease, without long-term current use of insulin (Multi) E11.22    Hypertension I10     Blood pressure is well-controlled on  lisinopril and nifedipine         Chronic kidney disease (CKD), stage III (moderate) (Multi) - Primary N18.30     Renal function is stable with creatinine of 1.61, blood pressure is well-controlled, diabetes well-controlled, not using SGLT2, had been on Jardiance, not using nephrotoxic medications         Relevant Orders    Comprehensive metabolic panel    Follow Up In Nephrology     Chronic kidney disease stage IV with baseline creatinine 1.8-2  Diabetes mellitus type 2 on Metformin with hemoglobin A1c of 6.2  Hypertension on nifedipine and lisinopril  Obesity  Obstructive sleep apnea  Hypothyroidism  Normocytic anemia  Dyslipidemia  Hematuria: Longstanding  Renal Cyst        Joyce Muniz, SHARMILA-HUNTER, DNP 09/03/24 10:31 AM

## 2024-09-03 NOTE — ASSESSMENT & PLAN NOTE
Renal function is stable with creatinine of 1.61, blood pressure is well-controlled, diabetes well-controlled, not using SGLT2, had been on Jardiance, not using nephrotoxic medications

## 2024-09-06 ENCOUNTER — TELEPHONE (OUTPATIENT)
Age: 79
End: 2024-09-06
Payer: MEDICARE

## 2024-09-06 NOTE — TELEPHONE ENCOUNTER
Patient called and states that shortly after she finished her antibiotic, her cough returned and she is requesting more antibiotics.

## 2024-09-16 ENCOUNTER — HOSPITAL ENCOUNTER (EMERGENCY)
Facility: HOSPITAL | Age: 79
Discharge: HOME | End: 2024-09-16
Attending: EMERGENCY MEDICINE
Payer: MEDICARE

## 2024-09-16 VITALS
TEMPERATURE: 97.7 F | HEART RATE: 88 BPM | RESPIRATION RATE: 18 BRPM | WEIGHT: 200 LBS | OXYGEN SATURATION: 98 % | HEIGHT: 60 IN | SYSTOLIC BLOOD PRESSURE: 159 MMHG | DIASTOLIC BLOOD PRESSURE: 89 MMHG | BODY MASS INDEX: 39.27 KG/M2

## 2024-09-16 DIAGNOSIS — K08.89 PAIN, DENTAL: Primary | ICD-10-CM

## 2024-09-16 PROCEDURE — 99283 EMERGENCY DEPT VISIT LOW MDM: CPT

## 2024-09-16 PROCEDURE — 2500000001 HC RX 250 WO HCPCS SELF ADMINISTERED DRUGS (ALT 637 FOR MEDICARE OP): Performed by: EMERGENCY MEDICINE

## 2024-09-16 RX ORDER — ACETAMINOPHEN 325 MG/1
975 TABLET ORAL ONCE
Status: COMPLETED | OUTPATIENT
Start: 2024-09-16 | End: 2024-09-16

## 2024-09-16 RX ORDER — IBUPROFEN 800 MG/1
800 TABLET ORAL ONCE
Status: COMPLETED | OUTPATIENT
Start: 2024-09-16 | End: 2024-09-16

## 2024-09-16 RX ORDER — CLINDAMYCIN HYDROCHLORIDE 150 MG/1
300 CAPSULE ORAL ONCE
Status: COMPLETED | OUTPATIENT
Start: 2024-09-16 | End: 2024-09-16

## 2024-09-16 RX ORDER — CLINDAMYCIN HYDROCHLORIDE 300 MG/1
300 CAPSULE ORAL 4 TIMES DAILY
Qty: 40 CAPSULE | Refills: 0 | Status: SHIPPED | OUTPATIENT
Start: 2024-09-16 | End: 2024-09-26

## 2024-09-16 ASSESSMENT — PAIN - FUNCTIONAL ASSESSMENT: PAIN_FUNCTIONAL_ASSESSMENT: 0-10

## 2024-09-16 ASSESSMENT — PAIN DESCRIPTION - ONSET: ONSET: GRADUAL

## 2024-09-16 ASSESSMENT — PAIN DESCRIPTION - FREQUENCY: FREQUENCY: CONSTANT/CONTINUOUS

## 2024-09-16 ASSESSMENT — PAIN DESCRIPTION - LOCATION: LOCATION: MOUTH

## 2024-09-16 ASSESSMENT — PAIN SCALES - GENERAL: PAINLEVEL_OUTOF10: 8

## 2024-09-16 ASSESSMENT — PAIN DESCRIPTION - ORIENTATION: ORIENTATION: LEFT;LOWER

## 2024-09-16 ASSESSMENT — PAIN DESCRIPTION - PAIN TYPE: TYPE: ACUTE PAIN

## 2024-09-16 ASSESSMENT — PAIN DESCRIPTION - PROGRESSION: CLINICAL_PROGRESSION: GRADUALLY WORSENING

## 2024-09-16 ASSESSMENT — PAIN DESCRIPTION - DESCRIPTORS: DESCRIPTORS: PATIENT UNABLE TO DESCRIBE

## 2024-09-16 ASSESSMENT — COLUMBIA-SUICIDE SEVERITY RATING SCALE - C-SSRS
2. HAVE YOU ACTUALLY HAD ANY THOUGHTS OF KILLING YOURSELF?: NO
1. IN THE PAST MONTH, HAVE YOU WISHED YOU WERE DEAD OR WISHED YOU COULD GO TO SLEEP AND NOT WAKE UP?: NO
6. HAVE YOU EVER DONE ANYTHING, STARTED TO DO ANYTHING, OR PREPARED TO DO ANYTHING TO END YOUR LIFE?: NO

## 2024-09-16 NOTE — ED PROVIDER NOTES
Patient complains of lower jaw pain x 3 days.  Denies any incident or injury.  Has not taken any medication for her symptoms today.  No difficulty breathing or swallowing         Review of Systems     Physical Exam  Vitals and nursing note reviewed.   Constitutional:       General: She is not in acute distress.     Appearance: She is well-developed.   HENT:      Head: Normocephalic and atraumatic.      Mouth/Throat:      Comments: Not appreciate any swelling or fluctuant area that I would be able to incise and drain.  No obvious cavities however when she does bite down on a tongue depressor she does have increased pain in the lower jaw area.  Eyes:      Conjunctiva/sclera: Conjunctivae normal.   Cardiovascular:      Rate and Rhythm: Normal rate and regular rhythm.      Heart sounds: No murmur heard.  Pulmonary:      Effort: Pulmonary effort is normal. No respiratory distress.      Breath sounds: Normal breath sounds.   Abdominal:      Palpations: Abdomen is soft.      Tenderness: There is no abdominal tenderness.   Musculoskeletal:         General: No swelling.      Cervical back: Neck supple.   Skin:     General: Skin is warm and dry.      Capillary Refill: Capillary refill takes less than 2 seconds.   Neurological:      Mental Status: She is alert.   Psychiatric:         Mood and Affect: Mood normal.          Labs Reviewed - No data to display     No orders to display        Procedures     Medical Decision Making  Patient complains of lower jaw pain x 3 days.  Denies any incident or injury.  Has not taken any medication for her symptoms today.  No difficulty breathing or swallowing.  Increased pain while biting down on a tongue depressor.  Will provide oral clindamycin Tylenol and ibuprofen here in the emergency department with a prescription for clindamycin at discharge and instruction to follow-up with her dentist.         Diagnoses as of 09/16/24 0501   Pain, dental                    Kd Moreira MD  09/16/24  1943

## 2024-09-17 ENCOUNTER — OFFICE VISIT (OUTPATIENT)
Age: 79
End: 2024-09-17
Payer: MEDICARE

## 2024-09-17 VITALS
BODY MASS INDEX: 40.05 KG/M2 | HEART RATE: 68 BPM | WEIGHT: 204 LBS | DIASTOLIC BLOOD PRESSURE: 76 MMHG | SYSTOLIC BLOOD PRESSURE: 128 MMHG | HEIGHT: 60 IN

## 2024-09-17 DIAGNOSIS — K08.89 PAIN, DENTAL: ICD-10-CM

## 2024-09-17 DIAGNOSIS — Z23 NEED FOR INFLUENZA VACCINATION: Primary | ICD-10-CM

## 2024-09-17 PROCEDURE — 90673 RIV3 VACCINE NO PRESERV IM: CPT | Performed by: FAMILY MEDICINE

## 2024-09-17 PROCEDURE — G0008 ADMIN INFLUENZA VIRUS VAC: HCPCS | Performed by: FAMILY MEDICINE

## 2024-09-17 PROCEDURE — 99213 OFFICE O/P EST LOW 20 MIN: CPT | Performed by: FAMILY MEDICINE

## 2024-09-17 PROCEDURE — 1159F MED LIST DOCD IN RCRD: CPT | Performed by: FAMILY MEDICINE

## 2024-09-17 PROCEDURE — 3078F DIAST BP <80 MM HG: CPT | Performed by: FAMILY MEDICINE

## 2024-09-17 PROCEDURE — 1036F TOBACCO NON-USER: CPT | Performed by: FAMILY MEDICINE

## 2024-09-17 PROCEDURE — 3074F SYST BP LT 130 MM HG: CPT | Performed by: FAMILY MEDICINE

## 2024-09-17 PROCEDURE — 1160F RVW MEDS BY RX/DR IN RCRD: CPT | Performed by: FAMILY MEDICINE

## 2024-09-17 PROCEDURE — G2211 COMPLEX E/M VISIT ADD ON: HCPCS | Performed by: FAMILY MEDICINE

## 2024-09-17 NOTE — PROGRESS NOTES
Subjective  Georgie Friedman is a 78 y.o. female who presents for Earache.  HPI  Pt here for followup Er for dental pain.  Doing much better on the clindamycin, and no diarrhea.  Plans to call dentist after this visit.   Review of Systems   All other systems reviewed and are negative.  .    No Known Allergies    Current Outpatient Medications on File Prior to Visit   Medication Sig Dispense Refill    Accu-Chek Guide test strips strip TEST BLOOD SUGAR ONE TIME DAILY 100 strip 3    Accu-Chek Softclix Lancets misc TEST BLOOD SUGAR ONE TIME DAILY 100 each 3    albuterol 0.63 mg/3 mL nebulizer solution Take 3 mL (0.63 mg) by nebulization every 6 hours if needed for wheezing. 75 mL 11    aspirin 81 mg EC tablet Take 1 tablet (81 mg) by mouth once daily.      blood-glucose meter (Accu-Chek Guide Me Glucose Mtr) misc Inject 1 kit under the skin once daily. 1 each 0    calcium carbonate-vitamin D3 (Calcium 500 + D) 500 mg-10 mcg (400 unit) chewable tablet Chew 1 tablet once daily.      clindamycin (Cleocin) 300 mg capsule Take 1 capsule (300 mg) by mouth 4 times a day for 10 days. 40 capsule 0    cyanocobalamin (Vitamin B-12) 1,000 mcg tablet Take 1 tablet (1,000 mcg) by mouth once daily.      diclofenac sodium (Voltaren) 1 % gel Apply 4.5 inches (4 g) topically 4 times a day as needed (PAIN). 100 g 1    DropSafe Alcohol Prep Pads pads, medicated USE ONE TIME DAILY AS DIRECTED 100 each 3    empagliflozin (Jardiance) 10 mg Take 1 tablet (10 mg) by mouth once daily. 30 tablet 5    FERROUS GLUCONATE ORAL Take 45 mg by mouth once daily.      lisinopril 40 mg tablet TAKE 1 TABLET ONE TIME DAILY 90 tablet 3    magnesium oxide (Mag-Ox) 400 mg tablet 1 tablet (400 mg) once daily.      multivitamin tablet Take by mouth.      NIFEdipine ER (Adalat CC) 60 mg 24 hr tablet Take 1 tablet (60 mg) by mouth once daily. 90 tablet 3    omeprazole (PriLOSEC) 40 mg DR capsule TAKE 1 CAPSULE TWICE DAILY BEFORE MEALS 180 capsule 3     rosuvastatin (Crestor) 10 mg tablet TAKE 1 TABLET ONE TIME DAILY 90 tablet 3    sertraline (Zoloft) 50 mg tablet TAKE 1 TABLET ONE TIME DAILY 90 tablet 3    ondansetron ODT (Zofran-ODT) 8 mg disintegrating tablet Take 1 tablet (8 mg) by mouth every 8 hours if needed for nausea or vomiting. (Patient not taking: Reported on 9/17/2024) 20 tablet 1    [DISCONTINUED] benzonatate (Tessalon) 100 mg capsule Take 1 capsule (100 mg) by mouth 3 times a day as needed for cough. Do not crush or chew. (Patient not taking: Reported on 9/17/2024) 30 capsule 0    [DISCONTINUED] diclofenac sodium (Voltaren) 1 % gel gel Apply 1 Application topically 4 times a day as needed (Pain). Apply 2-4 g topically 4 times a day as needed for Pain 100 g 1    [DISCONTINUED] doxycycline (Vibramycin) 100 mg capsule Take 1 capsule (100 mg) by mouth 2 times a day for 7 days. Take with at least 8 ounces (large glass) of water, do not lie down for 30 minutes after 14 capsule 0    [DISCONTINUED] predniSONE (Deltasone) 20 mg tablet Take 2 tablets (40 mg) by mouth once daily for 5 days. 10 tablet 0     No current facility-administered medications on file prior to visit.       Patient Active Problem List   Diagnosis    Acid reflux    Anemia    Diabetes mellitus with chronic kidney disease, without long-term current use of insulin (Multi)    Other hyperlipidemia    Hypertension    Hypomagnesemia    Major depression, recurrent (CMS-HCC)    Osteopenia    Severe sleep apnea    Stage 4 chronic kidney disease (Multi)    Chronic kidney disease (CKD), stage III (moderate) (Multi)       Objective     Visit Vitals  /76 (BP Location: Right arm, Patient Position: Sitting)   Pulse 68      Physical Exam  Vitals reviewed.   HENT:      Head: Normocephalic.      Mouth/Throat:      Comments: No oral lesions noted, upper plate in place left tm clear  Pulmonary:      Effort: Pulmonary effort is normal.   Neurological:      Mental Status: She is alert.          Assessment/Plan   Problem List Items Addressed This Visit    None  Visit Diagnoses       Need for influenza vaccination    -  Primary    Relevant Orders    Flu vaccine, trivalent, preservative free, no egg protein, age 18y+ (Flublok) (Completed)    Pain, dental                   Call concerns    Lindsey England MD

## 2024-09-17 NOTE — PROGRESS NOTES
Left earache for a couple of weeks; then her jaw started to get sore; went to ER yesterday; on ATB currently

## 2024-10-02 ENCOUNTER — APPOINTMENT (OUTPATIENT)
Dept: ORTHOPEDIC SURGERY | Facility: CLINIC | Age: 79
End: 2024-10-02
Payer: MEDICARE

## 2024-10-03 ENCOUNTER — APPOINTMENT (OUTPATIENT)
Dept: ORTHOPEDIC SURGERY | Facility: CLINIC | Age: 79
End: 2024-10-03
Payer: MEDICARE

## 2024-10-10 ENCOUNTER — OFFICE VISIT (OUTPATIENT)
Dept: ORTHOPEDIC SURGERY | Facility: CLINIC | Age: 79
End: 2024-10-10
Payer: MEDICARE

## 2024-10-10 ENCOUNTER — HOSPITAL ENCOUNTER (OUTPATIENT)
Dept: RADIOLOGY | Facility: CLINIC | Age: 79
Discharge: HOME | End: 2024-10-10
Payer: MEDICARE

## 2024-10-10 ENCOUNTER — HOSPITAL ENCOUNTER (OUTPATIENT)
Dept: RADIOLOGY | Facility: EXTERNAL LOCATION | Age: 79
Discharge: HOME | End: 2024-10-10

## 2024-10-10 DIAGNOSIS — M17.11 PRIMARY OSTEOARTHRITIS OF RIGHT KNEE: ICD-10-CM

## 2024-10-10 DIAGNOSIS — M17.12 ARTHRITIS OF KNEE, LEFT: Primary | ICD-10-CM

## 2024-10-10 PROCEDURE — 73564 X-RAY EXAM KNEE 4 OR MORE: CPT | Mod: LEFT SIDE | Performed by: RADIOLOGY

## 2024-10-10 PROCEDURE — 73564 X-RAY EXAM KNEE 4 OR MORE: CPT | Mod: LT

## 2024-10-10 PROCEDURE — 1160F RVW MEDS BY RX/DR IN RCRD: CPT | Performed by: SPECIALIST

## 2024-10-10 PROCEDURE — 99214 OFFICE O/P EST MOD 30 MIN: CPT | Performed by: SPECIALIST

## 2024-10-10 PROCEDURE — 1036F TOBACCO NON-USER: CPT | Performed by: SPECIALIST

## 2024-10-10 PROCEDURE — 1159F MED LIST DOCD IN RCRD: CPT | Performed by: SPECIALIST

## 2024-10-10 PROCEDURE — 76882 US LMTD JT/FCL EVL NVASC XTR: CPT | Performed by: SPECIALIST

## 2024-10-10 ASSESSMENT — PAIN - FUNCTIONAL ASSESSMENT: PAIN_FUNCTIONAL_ASSESSMENT: NO/DENIES PAIN

## 2024-10-11 NOTE — PROGRESS NOTES
Assessment/Plan   Encounter Diagnoses:  Arthritis of knee, left    Primary osteoarthritis of right knee  No problem-specific Assessment & Plan notes found for this encounter.     Arthritis of knee, left  Assessment: Left knee arthritis 80% improved from the viscosupplementation injections that were performed in mid March.    Plan:  Continue her home activity program and the use of over-the-counter pain medications as needed.  Follow-up in 3 to 4 months for reevaluation.     Subjective    Patient ID: Georgie Friedman is a 79 y.o. female.    Chief Complaint: Follow-up of the Left Knee (X-RAYS TODAY)     Last Surgery: No surgery found  Last Surgery Date: No surgery found    HPI  79-year-old female who has done quite well with viscosupplementation injections.  She states she is 80% improved and is pleased with her progress.  She does not feel she needs further injections.    OBJECTIVE: ORTHO EXAM  Left knee  Minimal to mild effusion.  20 degrees of varus.  Corrects to near neutral with valgus stress.  Neurovascularly intact distally.  Her thigh is supple and nontender.  Her calves are supple and nontender bilaterally.  She has tenderness along the medial joint line that is mild.    IMAGE RESULTS:  Point of Care Ultrasound  These images are not reportable by radiology and will not be interpreted   by  Radiologists.      ULTRASOUND  DIAGNOSTIC ULTRASOUND REPORT FINAL: Left KNEE  Sonographer: Phil Minor MD  Indication: Knee Pain  Procedure: Ultrasound, extremity, nonvascular, real-time, COMPLETE, anatomic specific  Technique: B-Mode Ultrasound Examination performed using 6- 9 MHz linear transducer with [employer] Software  STUDY TYPE:   1. ULTRASOUND EXTREMITY  2. REAL TIME WITH IMAGE DOCUMENTATION  3. NON-VASCULAR  4. COMPLETE STUDY, INCLUDING BUT NOT LIMITED TO MUSCLE, TENDONS, LIGAMENTS, SOFT TISSUES, ADIPOSE TISSUE AND SUBCUTANEOUS TISSUE.  Site: KNEE   Live ultrasound was performed with of patient's  KNEE and  PERMANENTLY documented. I personally performed the ultrasound and reviewed the findings. These show:    Cass-articular evaluation:   An intact Quadriceps Tendon with the Quadriceps Muscle fibers showing normal striations Quadriceps Tendon demonstrating normal fibrillar pattern. . The Patellar Tendon demonstrates normal fibrillar pattern and is intact.   No significant soft tissue fluid collection/abscess appreciated.     Joint Evaluation:  The lateral joint line shows an intact LCL.   Medial joint line exam shows an intact MCL.   The patellar tendon was within normal limits.  Mild to moderate joint effusion noted.     The patient tolerated the procedure well.        Procedures     Orders Placed This Encounter    XR knee left 4+ views    Point of Care Ultrasound

## 2024-10-11 NOTE — ASSESSMENT & PLAN NOTE
Assessment: Left knee arthritis 80% improved from the viscosupplementation injections that were performed in mid March.    Plan:  Continue her home activity program and the use of over-the-counter pain medications as needed.  Follow-up in 3 to 4 months for reevaluation.

## 2025-01-04 DIAGNOSIS — I10 PRIMARY HYPERTENSION: ICD-10-CM

## 2025-01-06 RX ORDER — NIFEDIPINE 60 MG/1
60 TABLET, EXTENDED RELEASE ORAL DAILY
Qty: 90 TABLET | Refills: 3 | Status: SHIPPED | OUTPATIENT
Start: 2025-01-06

## 2025-02-11 ENCOUNTER — APPOINTMENT (OUTPATIENT)
Dept: ORTHOPEDIC SURGERY | Facility: CLINIC | Age: 80
End: 2025-02-11
Payer: MEDICARE

## 2025-02-20 ENCOUNTER — APPOINTMENT (OUTPATIENT)
Age: 80
End: 2025-02-20
Payer: MEDICARE

## 2025-03-04 ENCOUNTER — APPOINTMENT (OUTPATIENT)
Dept: NEPHROLOGY | Facility: CLINIC | Age: 80
End: 2025-03-04
Payer: MEDICARE

## 2025-03-04 LAB
ALBUMIN SERPL-MCNC: 4.7 G/DL (ref 3.6–5.1)
ALP SERPL-CCNC: 80 U/L (ref 37–153)
ALT SERPL-CCNC: 11 U/L (ref 6–29)
ANION GAP SERPL CALCULATED.4IONS-SCNC: 10 MMOL/L (CALC) (ref 7–17)
AST SERPL-CCNC: 17 U/L (ref 10–35)
BILIRUB SERPL-MCNC: 0.4 MG/DL (ref 0.2–1.2)
BUN SERPL-MCNC: 36 MG/DL (ref 7–25)
CALCIUM SERPL-MCNC: 9.4 MG/DL (ref 8.6–10.4)
CHLORIDE SERPL-SCNC: 106 MMOL/L (ref 98–110)
CHOLEST SERPL-MCNC: 206 MG/DL
CHOLEST/HDLC SERPL: 3.2 (CALC)
CO2 SERPL-SCNC: 26 MMOL/L (ref 20–32)
CREAT SERPL-MCNC: 1.69 MG/DL (ref 0.6–1)
EGFRCR SERPLBLD CKD-EPI 2021: 31 ML/MIN/1.73M2
EST. AVERAGE GLUCOSE BLD GHB EST-MCNC: 123 MG/DL
EST. AVERAGE GLUCOSE BLD GHB EST-SCNC: 6.8 MMOL/L
GLUCOSE SERPL-MCNC: 99 MG/DL (ref 65–99)
HBA1C MFR BLD: 5.9 % OF TOTAL HGB
HDLC SERPL-MCNC: 64 MG/DL
LDLC SERPL CALC-MCNC: 113 MG/DL (CALC)
NONHDLC SERPL-MCNC: 142 MG/DL (CALC)
POTASSIUM SERPL-SCNC: 4.8 MMOL/L (ref 3.5–5.3)
PROT SERPL-MCNC: 6.8 G/DL (ref 6.1–8.1)
SODIUM SERPL-SCNC: 142 MMOL/L (ref 135–146)
TRIGL SERPL-MCNC: 169 MG/DL

## 2025-03-06 ENCOUNTER — APPOINTMENT (OUTPATIENT)
Age: 80
End: 2025-03-06
Payer: MEDICARE

## 2025-03-06 VITALS
WEIGHT: 200 LBS | SYSTOLIC BLOOD PRESSURE: 130 MMHG | HEIGHT: 60 IN | HEART RATE: 88 BPM | BODY MASS INDEX: 39.27 KG/M2 | DIASTOLIC BLOOD PRESSURE: 80 MMHG

## 2025-03-06 DIAGNOSIS — F33.9 RECURRENT MAJOR DEPRESSIVE DISORDER, REMISSION STATUS UNSPECIFIED (CMS-HCC): ICD-10-CM

## 2025-03-06 DIAGNOSIS — N18.4 STAGE 4 CHRONIC KIDNEY DISEASE (MULTI): ICD-10-CM

## 2025-03-06 DIAGNOSIS — E78.49 OTHER HYPERLIPIDEMIA: ICD-10-CM

## 2025-03-06 DIAGNOSIS — N18.32 TYPE 2 DIABETES MELLITUS WITH STAGE 3B CHRONIC KIDNEY DISEASE, WITHOUT LONG-TERM CURRENT USE OF INSULIN (MULTI): ICD-10-CM

## 2025-03-06 DIAGNOSIS — I10 PRIMARY HYPERTENSION: ICD-10-CM

## 2025-03-06 DIAGNOSIS — E11.22 TYPE 2 DIABETES MELLITUS WITH STAGE 3B CHRONIC KIDNEY DISEASE, WITHOUT LONG-TERM CURRENT USE OF INSULIN (MULTI): ICD-10-CM

## 2025-03-06 DIAGNOSIS — I10 HYPERTENSION, UNSPECIFIED TYPE: ICD-10-CM

## 2025-03-06 DIAGNOSIS — K21.9 GASTROESOPHAGEAL REFLUX DISEASE, UNSPECIFIED WHETHER ESOPHAGITIS PRESENT: ICD-10-CM

## 2025-03-06 DIAGNOSIS — Z00.00 ROUTINE GENERAL MEDICAL EXAMINATION AT HEALTH CARE FACILITY: Primary | ICD-10-CM

## 2025-03-06 DIAGNOSIS — N18.4 TYPE 2 DIABETES MELLITUS WITH STAGE 4 CHRONIC KIDNEY DISEASE, WITHOUT LONG-TERM CURRENT USE OF INSULIN (MULTI): ICD-10-CM

## 2025-03-06 DIAGNOSIS — E11.22 TYPE 2 DIABETES MELLITUS WITH STAGE 4 CHRONIC KIDNEY DISEASE, WITHOUT LONG-TERM CURRENT USE OF INSULIN (MULTI): ICD-10-CM

## 2025-03-06 DIAGNOSIS — F33.0 MILD EPISODE OF RECURRENT MAJOR DEPRESSIVE DISORDER (CMS-HCC): ICD-10-CM

## 2025-03-06 PROCEDURE — 99214 OFFICE O/P EST MOD 30 MIN: CPT | Performed by: FAMILY MEDICINE

## 2025-03-06 PROCEDURE — 1170F FXNL STATUS ASSESSED: CPT | Performed by: FAMILY MEDICINE

## 2025-03-06 PROCEDURE — 1124F ACP DISCUSS-NO DSCNMKR DOCD: CPT | Performed by: FAMILY MEDICINE

## 2025-03-06 PROCEDURE — 1036F TOBACCO NON-USER: CPT | Performed by: FAMILY MEDICINE

## 2025-03-06 PROCEDURE — 1159F MED LIST DOCD IN RCRD: CPT | Performed by: FAMILY MEDICINE

## 2025-03-06 PROCEDURE — G0439 PPPS, SUBSEQ VISIT: HCPCS | Performed by: FAMILY MEDICINE

## 2025-03-06 PROCEDURE — 3079F DIAST BP 80-89 MM HG: CPT | Performed by: FAMILY MEDICINE

## 2025-03-06 PROCEDURE — 1160F RVW MEDS BY RX/DR IN RCRD: CPT | Performed by: FAMILY MEDICINE

## 2025-03-06 PROCEDURE — 3075F SYST BP GE 130 - 139MM HG: CPT | Performed by: FAMILY MEDICINE

## 2025-03-06 PROCEDURE — G2211 COMPLEX E/M VISIT ADD ON: HCPCS | Performed by: FAMILY MEDICINE

## 2025-03-06 RX ORDER — NIFEDIPINE 60 MG/1
60 TABLET, FILM COATED, EXTENDED RELEASE ORAL DAILY
Qty: 90 TABLET | Refills: 3 | Status: SHIPPED | OUTPATIENT
Start: 2025-03-06

## 2025-03-06 RX ORDER — LISINOPRIL 40 MG/1
40 TABLET ORAL DAILY
Qty: 90 TABLET | Refills: 3 | Status: SHIPPED | OUTPATIENT
Start: 2025-03-06

## 2025-03-06 RX ORDER — ROSUVASTATIN CALCIUM 10 MG/1
10 TABLET, COATED ORAL DAILY
Qty: 90 TABLET | Refills: 3 | Status: SHIPPED | OUTPATIENT
Start: 2025-03-06

## 2025-03-06 RX ORDER — OMEPRAZOLE 40 MG/1
40 CAPSULE, DELAYED RELEASE ORAL
Qty: 180 CAPSULE | Refills: 3 | Status: SHIPPED | OUTPATIENT
Start: 2025-03-06

## 2025-03-06 RX ORDER — SERTRALINE HYDROCHLORIDE 50 MG/1
50 TABLET, FILM COATED ORAL DAILY
Qty: 90 TABLET | Refills: 3 | Status: SHIPPED | OUTPATIENT
Start: 2025-03-06

## 2025-03-06 ASSESSMENT — PATIENT HEALTH QUESTIONNAIRE - PHQ9
2. FEELING DOWN, DEPRESSED OR HOPELESS: NOT AT ALL
SUM OF ALL RESPONSES TO PHQ9 QUESTIONS 1 AND 2: 0
1. LITTLE INTEREST OR PLEASURE IN DOING THINGS: NOT AT ALL

## 2025-03-06 ASSESSMENT — ACTIVITIES OF DAILY LIVING (ADL)
GROCERY_SHOPPING: INDEPENDENT
DOING_HOUSEWORK: INDEPENDENT
TAKING_MEDICATION: INDEPENDENT
DRESSING: INDEPENDENT
BATHING: INDEPENDENT
MANAGING_FINANCES: INDEPENDENT

## 2025-03-06 NOTE — ASSESSMENT & PLAN NOTE
Orders:    CBC and Auto Differential; Future    Hemoglobin A1C; Future    TSH with reflex to Free T4 if abnormal; Future    Microscopic Only, Urine; Future    Albumin-Creatinine Ratio, Urine Random; Future    Vitamin B12; Future

## 2025-03-06 NOTE — PROGRESS NOTES
Subjective   Reason for Visit: Georgie Friedman is an 79 y.o. female here for a Medicare Wellness visit.     Past Medical, Surgical, and Family History reviewed and updated in chart.    Reviewed all medications by prescribing practitioner or clinical pharmacist (such as prescriptions, OTCs, herbal therapies and supplements) and documented in the medical record.    HPI  DM, stage 3 CKD, HTN, depression, stable  Sees nephrology  Hyperlipidemia, states does miss her statin more frequently than the other meds, its small and stays in her med cup.  Patient Care Team:  Lindsey England MD as PCP - General (Family Medicine)  Lindsey England MD as PCP - [employer] PCP     Review of Systems   All other systems reviewed and are negative.      Objective   Vitals:  /80 (BP Location: Left arm, Patient Position: Sitting)   Pulse 88   Ht 1.524 m (5')   Wt 90.7 kg (200 lb)   BMI 39.06 kg/m²       Physical Exam  Vitals and nursing note reviewed.   Constitutional:       General: She is not in acute distress.     Appearance: Normal appearance. She is not toxic-appearing.   HENT:      Head: Normocephalic and atraumatic.   Cardiovascular:      Rate and Rhythm: Normal rate and regular rhythm.      Heart sounds: No murmur heard.  Pulmonary:      Effort: Pulmonary effort is normal.      Breath sounds: Normal breath sounds.   Musculoskeletal:      Cervical back: Neck supple. No rigidity.      Comments:     Skin:     General: Skin is warm and dry.   Neurological:      General: No focal deficit present.      Mental Status: She is alert and oriented to person, place, and time.   Psychiatric:         Mood and Affect: Mood normal.         Behavior: Behavior normal.       Orders Only on 03/03/2025   Component Date Value Ref Range Status    CHOLESTEROL, TOTAL 03/03/2025 206 (H)  <200 mg/dL Final    HDL CHOLESTEROL 03/03/2025 64  > OR = 50 mg/dL Final    TRIGLYCERIDES 03/03/2025 169 (H)  <150 mg/dL Final    LDL-CHOLESTEROL  03/03/2025 113 (H)  mg/dL (calc) Final    Comment: Reference range: <100     Desirable range <100 mg/dL for primary prevention;    <70 mg/dL for patients with CHD or diabetic patients   with > or = 2 CHD risk factors.     LDL-C is now calculated using the Kandis   calculation, which is a validated novel method providing   better accuracy than the Friedewald equation in the   estimation of LDL-C.   Nicho OMALLEY et al. EMEKA. 2013;310(19): 9327-1997   (http://education.Acunote/faq/SXO508)      CHOL/HDLC RATIO 03/03/2025 3.2  <5.0 (calc) Final    NON HDL CHOLESTEROL 03/03/2025 142 (H)  <130 mg/dL (calc) Final    Comment: For patients with diabetes plus 1 major ASCVD risk   factor, treating to a non-HDL-C goal of <100 mg/dL   (LDL-C of <70 mg/dL) is considered a therapeutic   option.      HEMOGLOBIN A1c 03/03/2025 5.9 (H)  <5.7 % of total Hgb Final    Comment: For someone without known diabetes, a hemoglobin   A1c value between 5.7% and 6.4% is consistent with  prediabetes and should be confirmed with a   follow-up test.     For someone with known diabetes, a value <7%  indicates that their diabetes is well controlled. A1c  targets should be individualized based on duration of  diabetes, age, comorbid conditions, and other  considerations.     This assay result is consistent with an increased risk  of diabetes.     Currently, no consensus exists regarding use of  hemoglobin A1c for diagnosis of diabetes for children.         eAG (mg/dL) 03/03/2025 123  mg/dL Final    eAG (mmol/L) 03/03/2025 6.8  mmol/L Final   Orders Only on 03/03/2025   Component Date Value Ref Range Status    GLUCOSE 03/03/2025 99  65 - 99 mg/dL Final    Comment:               Fasting reference interval         UREA NITROGEN (BUN) 03/03/2025 36 (H)  7 - 25 mg/dL Final    CREATININE 03/03/2025 1.69 (H)  0.60 - 1.00 mg/dL Final    EGFR 03/03/2025 31 (L)  > OR = 60 mL/min/1.73m2 Final    SODIUM 03/03/2025 142  135 - 146 mmol/L Final     POTASSIUM 03/03/2025 4.8  3.5 - 5.3 mmol/L Final    CHLORIDE 03/03/2025 106  98 - 110 mmol/L Final    CARBON DIOXIDE 03/03/2025 26  20 - 32 mmol/L Final    ELECTROLYTE BALANCE 03/03/2025 10  7 - 17 mmol/L (calc) Final    CALCIUM 03/03/2025 9.4  8.6 - 10.4 mg/dL Final    PROTEIN, TOTAL 03/03/2025 6.8  6.1 - 8.1 g/dL Final    ALBUMIN 03/03/2025 4.7  3.6 - 5.1 g/dL Final    BILIRUBIN, TOTAL 03/03/2025 0.4  0.2 - 1.2 mg/dL Final    ALKALINE PHOSPHATASE 03/03/2025 80  37 - 153 U/L Final    AST 03/03/2025 17  10 - 35 U/L Final    ALT 03/03/2025 11  6 - 29 U/L Final      Assessment & Plan  Type 2 diabetes mellitus with stage 3b chronic kidney disease, without long-term current use of insulin (Multi)    Orders:    CBC and Auto Differential; Future    Hemoglobin A1C; Future    TSH with reflex to Free T4 if abnormal; Future    Microscopic Only, Urine; Future    Albumin-Creatinine Ratio, Urine Random; Future    Vitamin B12; Future    Routine general medical examination at health care facility         Other hyperlipidemia         Primary hypertension         Mild episode of recurrent major depressive disorder (CMS-HCC)          Continue current care, follow up six months, labs prior.

## 2025-03-10 ENCOUNTER — APPOINTMENT (OUTPATIENT)
Dept: NEPHROLOGY | Facility: CLINIC | Age: 80
End: 2025-03-10
Payer: MEDICARE

## 2025-03-10 VITALS
OXYGEN SATURATION: 96 % | BODY MASS INDEX: 39.33 KG/M2 | WEIGHT: 201.4 LBS | HEART RATE: 78 BPM | SYSTOLIC BLOOD PRESSURE: 124 MMHG | DIASTOLIC BLOOD PRESSURE: 68 MMHG

## 2025-03-10 DIAGNOSIS — N18.4 STAGE 4 CHRONIC KIDNEY DISEASE (MULTI): Primary | ICD-10-CM

## 2025-03-10 DIAGNOSIS — N18.32 STAGE 3B CHRONIC KIDNEY DISEASE (MULTI): ICD-10-CM

## 2025-03-10 DIAGNOSIS — I10 PRIMARY HYPERTENSION: ICD-10-CM

## 2025-03-10 PROCEDURE — 1036F TOBACCO NON-USER: CPT | Performed by: CLINICAL NURSE SPECIALIST

## 2025-03-10 PROCEDURE — 1159F MED LIST DOCD IN RCRD: CPT | Performed by: CLINICAL NURSE SPECIALIST

## 2025-03-10 PROCEDURE — 99213 OFFICE O/P EST LOW 20 MIN: CPT | Performed by: CLINICAL NURSE SPECIALIST

## 2025-03-10 PROCEDURE — 3078F DIAST BP <80 MM HG: CPT | Performed by: CLINICAL NURSE SPECIALIST

## 2025-03-10 PROCEDURE — 3074F SYST BP LT 130 MM HG: CPT | Performed by: CLINICAL NURSE SPECIALIST

## 2025-03-10 RX ORDER — NIFEDIPINE 60 MG/1
60 TABLET, FILM COATED, EXTENDED RELEASE ORAL DAILY
Qty: 90 TABLET | Refills: 3 | Status: CANCELLED | OUTPATIENT
Start: 2025-03-10

## 2025-03-10 ASSESSMENT — ENCOUNTER SYMPTOMS
RESPIRATORY NEGATIVE: 1
GASTROINTESTINAL NEGATIVE: 1
CARDIOVASCULAR NEGATIVE: 1
PSYCHIATRIC NEGATIVE: 1
NEUROLOGICAL NEGATIVE: 1
MUSCULOSKELETAL NEGATIVE: 1
CONSTITUTIONAL NEGATIVE: 1
ENDOCRINE NEGATIVE: 1

## 2025-03-10 NOTE — PROGRESS NOTES
Subjective   Patient ID: Georgie Friedman is a 79 y.o. female who presents for Follow-up (6 month follow-up ).  Patient being seen in follow-up for chronic kidney disease stage IV with history of diabetes mellitus and hypertension    Labs reviewed  Glucose 99  Renal function with a BUN of 36 and creatinine of 1.69, GFR is 31  Sodium 142, potassium 4.8, chloride 106, bicarb 26  Total cholesterol 206, HDL 64, , triglycerides 169  Hemoglobin A1c 5.9    She did have the flu over winter however she has recovered well from this  Her blood pressure is well-controlled  She has no edema  She is able to stay fairly active  She is voiding without difficulties  Blood sugars are well-controlled        Review of Systems   Constitutional: Negative.    Respiratory: Negative.     Cardiovascular: Negative.    Gastrointestinal: Negative.    Endocrine: Negative.    Genitourinary: Negative.    Musculoskeletal: Negative.    Skin: Negative.    Neurological: Negative.    Psychiatric/Behavioral: Negative.         Objective   Physical Exam  Vitals reviewed.   Constitutional:       Appearance: Normal appearance.   HENT:      Head: Normocephalic.   Cardiovascular:      Rate and Rhythm: Normal rate and regular rhythm.   Pulmonary:      Effort: Pulmonary effort is normal.      Breath sounds: Normal breath sounds.   Abdominal:      Palpations: Abdomen is soft.   Musculoskeletal:         General: Normal range of motion.   Skin:     General: Skin is warm and dry.   Neurological:      Mental Status: She is alert and oriented to person, place, and time.   Psychiatric:         Mood and Affect: Mood normal.         Behavior: Behavior normal.         Assessment/Plan   Problem List Items Addressed This Visit             ICD-10-CM    Hypertension I10     Blood pressure is well-controlled on nifedipine and lisinopril         Stage 4 chronic kidney disease (Multi) - Primary N18.4    Relevant Orders    Basic metabolic panel    Urinalysis with Reflex  Microscopic    Albumin-Creatinine Ratio, Urine Random    Follow Up In Nephrology    Follow Up In Nephrology    Chronic kidney disease (CKD), stage III (moderate) (Multi) N18.30     Renal function stay with creatinine of 1.69, blood pressure is well-controlled, diabetes well-controlled, unable to use SGLT2's as she did have a yeast infection and UTI when she used last, will continue with current medications          Chronic kidney disease stage IV with baseline creatinine 1.8-2  Diabetes mellitus type 2 on Metformin with hemoglobin A1c of 6.2  Hypertension on nifedipine and lisinopril  Obesity  Obstructive sleep apnea  Hypothyroidism  Normocytic anemia  Dyslipidemia  Hematuria: Longstanding  Renal Cyst        Joyce Muniz, SHARMILA-HUNTER, DNP 03/10/25 11:34 AM

## 2025-03-10 NOTE — ASSESSMENT & PLAN NOTE
Renal function stay with creatinine of 1.69, blood pressure is well-controlled, diabetes well-controlled, unable to use SGLT2's as she did have a yeast infection and UTI when she used last, will continue with current medications

## 2025-03-18 ENCOUNTER — HOSPITAL ENCOUNTER (OUTPATIENT)
Dept: RADIOLOGY | Facility: EXTERNAL LOCATION | Age: 80
Discharge: HOME | End: 2025-03-18

## 2025-03-18 ENCOUNTER — APPOINTMENT (OUTPATIENT)
Dept: ORTHOPEDIC SURGERY | Facility: CLINIC | Age: 80
End: 2025-03-18
Payer: MEDICARE

## 2025-03-18 ENCOUNTER — HOSPITAL ENCOUNTER (OUTPATIENT)
Dept: RADIOLOGY | Facility: CLINIC | Age: 80
Discharge: HOME | End: 2025-03-18
Payer: MEDICARE

## 2025-03-18 DIAGNOSIS — M17.12 ARTHRITIS OF KNEE, LEFT: ICD-10-CM

## 2025-03-18 PROCEDURE — 76882 US LMTD JT/FCL EVL NVASC XTR: CPT | Performed by: SPECIALIST

## 2025-03-18 PROCEDURE — 1036F TOBACCO NON-USER: CPT | Performed by: SPECIALIST

## 2025-03-18 PROCEDURE — 73564 X-RAY EXAM KNEE 4 OR MORE: CPT | Mod: LEFT SIDE | Performed by: RADIOLOGY

## 2025-03-18 PROCEDURE — 73564 X-RAY EXAM KNEE 4 OR MORE: CPT | Mod: LT

## 2025-03-18 PROCEDURE — 1160F RVW MEDS BY RX/DR IN RCRD: CPT | Performed by: SPECIALIST

## 2025-03-18 PROCEDURE — 99214 OFFICE O/P EST MOD 30 MIN: CPT | Performed by: SPECIALIST

## 2025-03-18 PROCEDURE — 1159F MED LIST DOCD IN RCRD: CPT | Performed by: SPECIALIST

## 2025-03-18 ASSESSMENT — PAIN SCALES - GENERAL: PAINLEVEL_OUTOF10: 5 - MODERATE PAIN

## 2025-03-18 ASSESSMENT — PAIN - FUNCTIONAL ASSESSMENT: PAIN_FUNCTIONAL_ASSESSMENT: 0-10

## 2025-03-18 NOTE — ASSESSMENT & PLAN NOTE
"Assessment: Left knee arthritis still improved from the Orthovisc injections done a year ago.  However, her benefit is beginning to wane and she would like to talk about doing those again.      Plan:  Continue her home activity program and the use of over-the-counter pain medications as needed.  Obtain approval for and proceed with Orthovisc injections to the left knee.  Follow-up in 4 to 6 weeks hopefully this will be one of the injection appointments.  Ultrasound of the knee showed some effusion.  Consider supplemental cortisone injection if the Orthovisc does not \"dry up the knee\"  "

## 2025-03-18 NOTE — PROGRESS NOTES
"Assessment/Plan   Encounter Diagnoses:  Arthritis of knee, left  Arthritis of knee, left  Assessment: Left knee arthritis still improved from the Orthovisc injections done a year ago.  However, her benefit is beginning to wane and she would like to talk about doing those again.      Plan:  Continue her home activity program and the use of over-the-counter pain medications as needed.  Obtain approval for and proceed with Orthovisc injections to the left knee.  Follow-up in 4 to 6 weeks hopefully this will be one of the injection appointments.  Ultrasound of the knee showed some effusion.  Consider supplemental cortisone injection if the Orthovisc does not \"dry up the knee\"   Discussed weight loss with her.  She would need to do this in order to proceed with total knee arthroplasty safely.    Subjective    Patient ID: Georgie Friedman is a 79 y.o. female.    Chief Complaint: Follow-up of the Left Knee (X-RAYS 3-18-25/FINISHED ORTHOVISC 3-20-24 )     Last Surgery: No surgery found  Last Surgery Date: No surgery found    HPI  79-year-old who benefited greatly from Orthovisc injections.  She very much wants to avoid total knee arthroplasty.  She is not diabetic.    OBJECTIVE: ORTHO EXAM    Left knee:  Skin healthy and intact  No gross swelling or ecchymosis  Alignment: [Neutral  Varus  Valgus]  Effusion: Mild  ROM: 0 degrees Extension   100 degrees Flexion  Moderate crepitance with range of motion  No pain with internal rotation of the hip  Tenderness to palpation: Medial joint line     30 degrees laxity to valgus stress  Minimal laxity to varus stress  Negative Lachman´s test  Negative posterior drawer test  Mild pain with Jeremiah´s test     Neurovascular exam normal distally  2+ DP pulse and good cap refill    IMAGE RESULTS:  Point of Care Ultrasound  These images are not reportable by radiology and will not be interpreted   by  Radiologists.      ULTRASOUND  DIAGNOSTIC ULTRASOUND REPORT FINAL: Left " KNEE  Sonographer: Phil Minor MD  Indication: Knee Pain  Procedure: Ultrasound, extremity, nonvascular, real-time, COMPLETE, anatomic specific  Technique: B-Mode Ultrasound Examination performed using 6- 9 MHz linear transducer with [employer]  STUDY TYPE:   1. ULTRASOUND EXTREMITY  2. REAL TIME WITH IMAGE DOCUMENTATION  3. NON-VASCULAR  4. COMPLETE STUDY, INCLUDING BUT NOT LIMITED TO MUSCLE, TENDONS, LIGAMENTS, SOFT TISSUES, ADIPOSE TISSUE AND SUBCUTANEOUS TISSUE.  Site: KNEE   Live ultrasound was performed with of patient's  KNEE and PERMANENTLY documented. I personally performed the ultrasound and reviewed the findings. These show:    Cass-articular evaluation:   An intact Quadriceps Tendon with the Quadriceps Muscle fibers showing normal striations Quadriceps Tendon demonstrating normal fibrillar pattern. . The Patellar Tendon demonstrates normal fibrillar pattern and is intact.   No significant soft tissue fluid collection/abscess appreciated.     Joint Evaluation:  The lateral joint line shows an intact LCL.   Medial joint line exam shows an intact MCL.   The patellar tendon was within normal limits.  Moderate joint effusion noted.     The patient tolerated the procedure well.        Procedures     Orders Placed This Encounter    Point of Care Ultrasound    XR knee left 4+ views

## 2025-03-19 ENCOUNTER — SPECIALTY PHARMACY (OUTPATIENT)
Dept: PHARMACY | Facility: CLINIC | Age: 80
End: 2025-03-19

## 2025-03-19 DIAGNOSIS — M17.12 ARTHRITIS OF KNEE, LEFT: ICD-10-CM

## 2025-03-20 DIAGNOSIS — E11.22 TYPE 2 DIABETES MELLITUS WITH CHRONIC KIDNEY DISEASE, WITHOUT LONG-TERM CURRENT USE OF INSULIN, UNSPECIFIED CKD STAGE (MULTI): ICD-10-CM

## 2025-03-21 DIAGNOSIS — E11.22 TYPE 2 DIABETES MELLITUS WITH CHRONIC KIDNEY DISEASE, WITHOUT LONG-TERM CURRENT USE OF INSULIN, UNSPECIFIED CKD STAGE (MULTI): ICD-10-CM

## 2025-03-21 RX ORDER — LANCETS
EACH MISCELLANEOUS
Qty: 100 EACH | Refills: 3 | Status: SHIPPED | OUTPATIENT
Start: 2025-03-21

## 2025-03-21 RX ORDER — ISOPROPYL ALCOHOL 70 ML/100ML
SWAB TOPICAL
Qty: 100 EACH | Refills: 3 | Status: SHIPPED | OUTPATIENT
Start: 2025-03-21

## 2025-03-21 RX ORDER — BLOOD SUGAR DIAGNOSTIC
STRIP MISCELLANEOUS
Qty: 100 STRIP | Refills: 3 | Status: SHIPPED | OUTPATIENT
Start: 2025-03-21

## 2025-03-31 ENCOUNTER — TELEPHONE (OUTPATIENT)
Dept: ORTHOPEDIC SURGERY | Facility: CLINIC | Age: 80
End: 2025-03-31
Payer: MEDICARE

## 2025-03-31 NOTE — TELEPHONE ENCOUNTER
3/31/2025 1000 Spoke to Mary at  [facility]; patient has not agreed to the co-pay for the gel injections at this time and is seeking financial assistance for the gels.

## 2025-04-02 ENCOUNTER — HOSPITAL ENCOUNTER (OUTPATIENT)
Facility: HOSPITAL | Age: 80
Setting detail: OBSERVATION
Discharge: HOME | End: 2025-04-03
Attending: EMERGENCY MEDICINE | Admitting: STUDENT IN AN ORGANIZED HEALTH CARE EDUCATION/TRAINING PROGRAM
Payer: MEDICARE

## 2025-04-02 DIAGNOSIS — N17.9 AKI (ACUTE KIDNEY INJURY): Primary | ICD-10-CM

## 2025-04-02 DIAGNOSIS — R11.2 NAUSEA AND VOMITING, UNSPECIFIED VOMITING TYPE: ICD-10-CM

## 2025-04-02 DIAGNOSIS — R19.7 DIARRHEA, UNSPECIFIED TYPE: ICD-10-CM

## 2025-04-02 LAB
ALBUMIN SERPL BCP-MCNC: 4 G/DL (ref 3.4–5)
ALP SERPL-CCNC: 87 U/L (ref 33–136)
ALT SERPL W P-5'-P-CCNC: 26 U/L (ref 7–45)
ANION GAP SERPL CALC-SCNC: 15 MMOL/L (ref 10–20)
APPEARANCE UR: ABNORMAL
AST SERPL W P-5'-P-CCNC: 44 U/L (ref 9–39)
BACTERIA #/AREA URNS AUTO: ABNORMAL /HPF
BASOPHILS # BLD AUTO: 0.01 X10*3/UL (ref 0–0.1)
BASOPHILS NFR BLD AUTO: 0.2 %
BILIRUB DIRECT SERPL-MCNC: 0.1 MG/DL (ref 0–0.3)
BILIRUB SERPL-MCNC: 0.4 MG/DL (ref 0–1.2)
BILIRUB UR STRIP.AUTO-MCNC: NEGATIVE MG/DL
BUN SERPL-MCNC: 43 MG/DL (ref 6–23)
C DIF TOX TCDA+TCDB STL QL NAA+PROBE: NOT DETECTED
CALCIUM SERPL-MCNC: 8.7 MG/DL (ref 8.6–10.3)
CHLORIDE SERPL-SCNC: 103 MMOL/L (ref 98–107)
CO2 SERPL-SCNC: 17 MMOL/L (ref 21–32)
COLOR UR: ABNORMAL
CREAT SERPL-MCNC: 2.73 MG/DL (ref 0.5–1.05)
EGFRCR SERPLBLD CKD-EPI 2021: 17 ML/MIN/1.73M*2
EOSINOPHIL # BLD AUTO: 0.04 X10*3/UL (ref 0–0.4)
EOSINOPHIL NFR BLD AUTO: 0.6 %
ERYTHROCYTE [DISTWIDTH] IN BLOOD BY AUTOMATED COUNT: 13.9 % (ref 11.5–14.5)
FLUAV RNA RESP QL NAA+PROBE: NOT DETECTED
FLUBV RNA RESP QL NAA+PROBE: NOT DETECTED
GLUCOSE BLD MANUAL STRIP-MCNC: 107 MG/DL (ref 74–99)
GLUCOSE BLD MANUAL STRIP-MCNC: 93 MG/DL (ref 74–99)
GLUCOSE SERPL-MCNC: 98 MG/DL (ref 74–99)
GLUCOSE UR STRIP.AUTO-MCNC: NORMAL MG/DL
HCT VFR BLD AUTO: 38.5 % (ref 36–46)
HGB BLD-MCNC: 12.2 G/DL (ref 12–16)
HOLD SPECIMEN: NORMAL
IMM GRANULOCYTES # BLD AUTO: 0.03 X10*3/UL (ref 0–0.5)
IMM GRANULOCYTES NFR BLD AUTO: 0.5 % (ref 0–0.9)
KETONES UR STRIP.AUTO-MCNC: NEGATIVE MG/DL
LEUKOCYTE ESTERASE UR QL STRIP.AUTO: ABNORMAL
LIPASE SERPL-CCNC: 36 U/L (ref 9–82)
LYMPHOCYTES # BLD AUTO: 1.15 X10*3/UL (ref 0.8–3)
LYMPHOCYTES NFR BLD AUTO: 18 %
MCH RBC QN AUTO: 27.9 PG (ref 26–34)
MCHC RBC AUTO-ENTMCNC: 31.7 G/DL (ref 32–36)
MCV RBC AUTO: 88 FL (ref 80–100)
MONOCYTES # BLD AUTO: 0.72 X10*3/UL (ref 0.05–0.8)
MONOCYTES NFR BLD AUTO: 11.3 %
MUCOUS THREADS #/AREA URNS AUTO: ABNORMAL /LPF
NEUTROPHILS # BLD AUTO: 4.43 X10*3/UL (ref 1.6–5.5)
NEUTROPHILS NFR BLD AUTO: 69.4 %
NITRITE UR QL STRIP.AUTO: NEGATIVE
NRBC BLD-RTO: 0 /100 WBCS (ref 0–0)
PH UR STRIP.AUTO: 5.5 [PH]
PLATELET # BLD AUTO: 269 X10*3/UL (ref 150–450)
POTASSIUM SERPL-SCNC: 3.9 MMOL/L (ref 3.5–5.3)
PROT SERPL-MCNC: 6.6 G/DL (ref 6.4–8.2)
PROT UR STRIP.AUTO-MCNC: ABNORMAL MG/DL
RBC # BLD AUTO: 4.38 X10*6/UL (ref 4–5.2)
RBC # UR STRIP.AUTO: NEGATIVE MG/DL
RBC #/AREA URNS AUTO: ABNORMAL /HPF
RSV RNA RESP QL NAA+PROBE: NOT DETECTED
SARS-COV-2 RNA RESP QL NAA+PROBE: NOT DETECTED
SODIUM SERPL-SCNC: 131 MMOL/L (ref 136–145)
SP GR UR STRIP.AUTO: 1.01
SQUAMOUS #/AREA URNS AUTO: ABNORMAL /HPF
UROBILINOGEN UR STRIP.AUTO-MCNC: NORMAL MG/DL
WBC # BLD AUTO: 6.4 X10*3/UL (ref 4.4–11.3)
WBC #/AREA URNS AUTO: ABNORMAL /HPF

## 2025-04-02 PROCEDURE — 80048 BASIC METABOLIC PNL TOTAL CA: CPT | Performed by: PHYSICIAN ASSISTANT

## 2025-04-02 PROCEDURE — G0378 HOSPITAL OBSERVATION PER HR: HCPCS

## 2025-04-02 PROCEDURE — 99285 EMERGENCY DEPT VISIT HI MDM: CPT | Mod: 25 | Performed by: EMERGENCY MEDICINE

## 2025-04-02 PROCEDURE — 85025 COMPLETE CBC W/AUTO DIFF WBC: CPT | Performed by: PHYSICIAN ASSISTANT

## 2025-04-02 PROCEDURE — 87637 SARSCOV2&INF A&B&RSV AMP PRB: CPT | Performed by: PHYSICIAN ASSISTANT

## 2025-04-02 PROCEDURE — 80053 COMPREHEN METABOLIC PANEL: CPT | Performed by: PHYSICIAN ASSISTANT

## 2025-04-02 PROCEDURE — 96361 HYDRATE IV INFUSION ADD-ON: CPT

## 2025-04-02 PROCEDURE — 81001 URINALYSIS AUTO W/SCOPE: CPT | Performed by: PHYSICIAN ASSISTANT

## 2025-04-02 PROCEDURE — 96374 THER/PROPH/DIAG INJ IV PUSH: CPT

## 2025-04-02 PROCEDURE — 99223 1ST HOSP IP/OBS HIGH 75: CPT | Performed by: STUDENT IN AN ORGANIZED HEALTH CARE EDUCATION/TRAINING PROGRAM

## 2025-04-02 PROCEDURE — 82248 BILIRUBIN DIRECT: CPT | Performed by: PHYSICIAN ASSISTANT

## 2025-04-02 PROCEDURE — 83690 ASSAY OF LIPASE: CPT | Performed by: PHYSICIAN ASSISTANT

## 2025-04-02 PROCEDURE — 87506 IADNA-DNA/RNA PROBE TQ 6-11: CPT | Mod: SAMLAB | Performed by: STUDENT IN AN ORGANIZED HEALTH CARE EDUCATION/TRAINING PROGRAM

## 2025-04-02 PROCEDURE — 36415 COLL VENOUS BLD VENIPUNCTURE: CPT | Performed by: PHYSICIAN ASSISTANT

## 2025-04-02 PROCEDURE — 87086 URINE CULTURE/COLONY COUNT: CPT | Mod: SAMLAB | Performed by: PHYSICIAN ASSISTANT

## 2025-04-02 PROCEDURE — 2500000004 HC RX 250 GENERAL PHARMACY W/ HCPCS (ALT 636 FOR OP/ED): Performed by: STUDENT IN AN ORGANIZED HEALTH CARE EDUCATION/TRAINING PROGRAM

## 2025-04-02 PROCEDURE — 2500000001 HC RX 250 WO HCPCS SELF ADMINISTERED DRUGS (ALT 637 FOR MEDICARE OP): Performed by: STUDENT IN AN ORGANIZED HEALTH CARE EDUCATION/TRAINING PROGRAM

## 2025-04-02 PROCEDURE — 2500000004 HC RX 250 GENERAL PHARMACY W/ HCPCS (ALT 636 FOR OP/ED): Performed by: PHYSICIAN ASSISTANT

## 2025-04-02 PROCEDURE — 96372 THER/PROPH/DIAG INJ SC/IM: CPT | Performed by: STUDENT IN AN ORGANIZED HEALTH CARE EDUCATION/TRAINING PROGRAM

## 2025-04-02 PROCEDURE — 82947 ASSAY GLUCOSE BLOOD QUANT: CPT

## 2025-04-02 PROCEDURE — 2500000002 HC RX 250 W HCPCS SELF ADMINISTERED DRUGS (ALT 637 FOR MEDICARE OP, ALT 636 FOR OP/ED): Performed by: STUDENT IN AN ORGANIZED HEALTH CARE EDUCATION/TRAINING PROGRAM

## 2025-04-02 PROCEDURE — 87493 C DIFF AMPLIFIED PROBE: CPT | Performed by: STUDENT IN AN ORGANIZED HEALTH CARE EDUCATION/TRAINING PROGRAM

## 2025-04-02 RX ORDER — INSULIN LISPRO 100 [IU]/ML
0-10 INJECTION, SOLUTION INTRAVENOUS; SUBCUTANEOUS
Status: DISCONTINUED | OUTPATIENT
Start: 2025-04-02 | End: 2025-04-03 | Stop reason: HOSPADM

## 2025-04-02 RX ORDER — ASPIRIN 81 MG/1
81 TABLET ORAL DAILY
Status: DISCONTINUED | OUTPATIENT
Start: 2025-04-02 | End: 2025-04-03 | Stop reason: HOSPADM

## 2025-04-02 RX ORDER — NIFEDIPINE 60 MG/1
60 TABLET, FILM COATED, EXTENDED RELEASE ORAL DAILY
Status: DISCONTINUED | OUTPATIENT
Start: 2025-04-02 | End: 2025-04-03 | Stop reason: HOSPADM

## 2025-04-02 RX ORDER — ACETAMINOPHEN 160 MG/5ML
650 SOLUTION ORAL EVERY 4 HOURS PRN
Status: DISCONTINUED | OUTPATIENT
Start: 2025-04-02 | End: 2025-04-03 | Stop reason: HOSPADM

## 2025-04-02 RX ORDER — SODIUM CHLORIDE 9 MG/ML
100 INJECTION, SOLUTION INTRAVENOUS CONTINUOUS
Status: ACTIVE | OUTPATIENT
Start: 2025-04-02 | End: 2025-04-03

## 2025-04-02 RX ORDER — ROSUVASTATIN CALCIUM 5 MG/1
10 TABLET, COATED ORAL DAILY
Status: DISCONTINUED | OUTPATIENT
Start: 2025-04-02 | End: 2025-04-03 | Stop reason: HOSPADM

## 2025-04-02 RX ORDER — HEPARIN SODIUM 5000 [USP'U]/ML
5000 INJECTION, SOLUTION INTRAVENOUS; SUBCUTANEOUS EVERY 8 HOURS
Status: DISCONTINUED | OUTPATIENT
Start: 2025-04-02 | End: 2025-04-03 | Stop reason: HOSPADM

## 2025-04-02 RX ORDER — SERTRALINE HYDROCHLORIDE 50 MG/1
50 TABLET, FILM COATED ORAL DAILY
Status: DISCONTINUED | OUTPATIENT
Start: 2025-04-02 | End: 2025-04-03 | Stop reason: HOSPADM

## 2025-04-02 RX ORDER — PANTOPRAZOLE SODIUM 40 MG/1
40 TABLET, DELAYED RELEASE ORAL
Status: DISCONTINUED | OUTPATIENT
Start: 2025-04-03 | End: 2025-04-03 | Stop reason: HOSPADM

## 2025-04-02 RX ORDER — ACETAMINOPHEN 325 MG/1
650 TABLET ORAL EVERY 4 HOURS PRN
Status: DISCONTINUED | OUTPATIENT
Start: 2025-04-02 | End: 2025-04-03 | Stop reason: HOSPADM

## 2025-04-02 RX ORDER — ONDANSETRON HYDROCHLORIDE 2 MG/ML
4 INJECTION, SOLUTION INTRAVENOUS EVERY 6 HOURS PRN
Status: DISCONTINUED | OUTPATIENT
Start: 2025-04-02 | End: 2025-04-03 | Stop reason: HOSPADM

## 2025-04-02 RX ADMIN — ROSUVASTATIN CALCIUM 10 MG: 5 TABLET, FILM COATED ORAL at 14:26

## 2025-04-02 RX ADMIN — SODIUM CHLORIDE 500 ML: 9 INJECTION, SOLUTION INTRAVENOUS at 10:34

## 2025-04-02 RX ADMIN — SODIUM CHLORIDE 1000 ML: 0.9 INJECTION, SOLUTION INTRAVENOUS at 10:28

## 2025-04-02 RX ADMIN — ASPIRIN 81 MG: 81 TABLET, COATED ORAL at 14:26

## 2025-04-02 RX ADMIN — ONDANSETRON 4 MG: 2 INJECTION, SOLUTION INTRAMUSCULAR; INTRAVENOUS at 19:34

## 2025-04-02 RX ADMIN — SODIUM CHLORIDE 100 ML/HR: 9 INJECTION, SOLUTION INTRAVENOUS at 14:34

## 2025-04-02 RX ADMIN — SODIUM CHLORIDE 500 ML: 9 INJECTION, SOLUTION INTRAVENOUS at 12:06

## 2025-04-02 RX ADMIN — SERTRALINE HYDROCHLORIDE 50 MG: 50 TABLET ORAL at 14:26

## 2025-04-02 RX ADMIN — HEPARIN SODIUM 5000 UNITS: 5000 INJECTION, SOLUTION INTRAVENOUS; SUBCUTANEOUS at 14:26

## 2025-04-02 RX ADMIN — HEPARIN SODIUM 5000 UNITS: 5000 INJECTION, SOLUTION INTRAVENOUS; SUBCUTANEOUS at 21:34

## 2025-04-02 RX ADMIN — NIFEDIPINE 60 MG: 60 TABLET, EXTENDED RELEASE ORAL at 14:26

## 2025-04-02 SDOH — SOCIAL STABILITY: SOCIAL INSECURITY: DO YOU FEEL ANYONE HAS EXPLOITED OR TAKEN ADVANTAGE OF YOU FINANCIALLY OR OF YOUR PERSONAL PROPERTY?: NO

## 2025-04-02 SDOH — SOCIAL STABILITY: SOCIAL INSECURITY: DOES ANYONE TRY TO KEEP YOU FROM HAVING/CONTACTING OTHER FRIENDS OR DOING THINGS OUTSIDE YOUR HOME?: NO

## 2025-04-02 SDOH — ECONOMIC STABILITY: HOUSING INSECURITY: IN THE LAST 12 MONTHS, WAS THERE A TIME WHEN YOU WERE NOT ABLE TO PAY THE MORTGAGE OR RENT ON TIME?: NO

## 2025-04-02 SDOH — ECONOMIC STABILITY: FOOD INSECURITY: WITHIN THE PAST 12 MONTHS, YOU WORRIED THAT YOUR FOOD WOULD RUN OUT BEFORE YOU GOT THE MONEY TO BUY MORE.: NEVER TRUE

## 2025-04-02 SDOH — ECONOMIC STABILITY: FOOD INSECURITY: WITHIN THE PAST 12 MONTHS, THE FOOD YOU BOUGHT JUST DIDN'T LAST AND YOU DIDN'T HAVE MONEY TO GET MORE.: NEVER TRUE

## 2025-04-02 SDOH — SOCIAL STABILITY: SOCIAL INSECURITY: ABUSE: ADULT

## 2025-04-02 SDOH — ECONOMIC STABILITY: FOOD INSECURITY: HOW HARD IS IT FOR YOU TO PAY FOR THE VERY BASICS LIKE FOOD, HOUSING, MEDICAL CARE, AND HEATING?: NOT HARD AT ALL

## 2025-04-02 SDOH — SOCIAL STABILITY: SOCIAL INSECURITY: WITHIN THE LAST YEAR, HAVE YOU BEEN AFRAID OF YOUR PARTNER OR EX-PARTNER?: NO

## 2025-04-02 SDOH — SOCIAL STABILITY: SOCIAL INSECURITY: HAS ANYONE EVER THREATENED TO HURT YOUR FAMILY OR YOUR PETS?: NO

## 2025-04-02 SDOH — SOCIAL STABILITY: SOCIAL INSECURITY: DO YOU FEEL UNSAFE GOING BACK TO THE PLACE WHERE YOU ARE LIVING?: NO

## 2025-04-02 SDOH — SOCIAL STABILITY: SOCIAL INSECURITY: ARE YOU OR HAVE YOU BEEN THREATENED OR ABUSED PHYSICALLY, EMOTIONALLY, OR SEXUALLY BY ANYONE?: NO

## 2025-04-02 SDOH — SOCIAL STABILITY: SOCIAL INSECURITY: WITHIN THE LAST YEAR, HAVE YOU BEEN HUMILIATED OR EMOTIONALLY ABUSED IN OTHER WAYS BY YOUR PARTNER OR EX-PARTNER?: NO

## 2025-04-02 SDOH — ECONOMIC STABILITY: HOUSING INSECURITY: AT ANY TIME IN THE PAST 12 MONTHS, WERE YOU HOMELESS OR LIVING IN A SHELTER (INCLUDING NOW)?: NO

## 2025-04-02 SDOH — ECONOMIC STABILITY: HOUSING INSECURITY: IN THE PAST 12 MONTHS, HOW MANY TIMES HAVE YOU MOVED WHERE YOU WERE LIVING?: 0

## 2025-04-02 SDOH — SOCIAL STABILITY: SOCIAL INSECURITY: HAVE YOU HAD ANY THOUGHTS OF HARMING ANYONE ELSE?: NO

## 2025-04-02 SDOH — ECONOMIC STABILITY: INCOME INSECURITY: IN THE PAST 12 MONTHS HAS THE ELECTRIC, GAS, OIL, OR WATER COMPANY THREATENED TO SHUT OFF SERVICES IN YOUR HOME?: NO

## 2025-04-02 SDOH — SOCIAL STABILITY: SOCIAL INSECURITY: HAVE YOU HAD THOUGHTS OF HARMING ANYONE ELSE?: NO

## 2025-04-02 SDOH — ECONOMIC STABILITY: TRANSPORTATION INSECURITY: IN THE PAST 12 MONTHS, HAS LACK OF TRANSPORTATION KEPT YOU FROM MEDICAL APPOINTMENTS OR FROM GETTING MEDICATIONS?: NO

## 2025-04-02 SDOH — SOCIAL STABILITY: SOCIAL INSECURITY
WITHIN THE LAST YEAR, HAVE YOU BEEN KICKED, HIT, SLAPPED, OR OTHERWISE PHYSICALLY HURT BY YOUR PARTNER OR EX-PARTNER?: NO

## 2025-04-02 SDOH — SOCIAL STABILITY: SOCIAL INSECURITY: WERE YOU ABLE TO COMPLETE ALL THE BEHAVIORAL HEALTH SCREENINGS?: YES

## 2025-04-02 SDOH — SOCIAL STABILITY: SOCIAL INSECURITY
WITHIN THE LAST YEAR, HAVE YOU BEEN RAPED OR FORCED TO HAVE ANY KIND OF SEXUAL ACTIVITY BY YOUR PARTNER OR EX-PARTNER?: NO

## 2025-04-02 SDOH — SOCIAL STABILITY: SOCIAL INSECURITY: ARE THERE ANY APPARENT SIGNS OF INJURIES/BEHAVIORS THAT COULD BE RELATED TO ABUSE/NEGLECT?: NO

## 2025-04-02 ASSESSMENT — COGNITIVE AND FUNCTIONAL STATUS - GENERAL
PATIENT BASELINE BEDBOUND: NO
DAILY ACTIVITIY SCORE: 24
MOBILITY SCORE: 24
DAILY ACTIVITIY SCORE: 24
MOBILITY SCORE: 24

## 2025-04-02 ASSESSMENT — ENCOUNTER SYMPTOMS
DYSURIA: 0
COUGH: 0
SEIZURES: 0
EYE PAIN: 0
CHILLS: 0
COLOR CHANGE: 0
SHORTNESS OF BREATH: 0
FEVER: 0
ABDOMINAL PAIN: 0
BACK PAIN: 0
PALPITATIONS: 0
ARTHRALGIAS: 0
HEMATURIA: 0
VOMITING: 0
SORE THROAT: 0

## 2025-04-02 ASSESSMENT — PAIN - FUNCTIONAL ASSESSMENT
PAIN_FUNCTIONAL_ASSESSMENT: 0-10

## 2025-04-02 ASSESSMENT — LIFESTYLE VARIABLES
HOW OFTEN DO YOU HAVE 6 OR MORE DRINKS ON ONE OCCASION: NEVER
SKIP TO QUESTIONS 9-10: 1
AUDIT-C TOTAL SCORE: 0
HOW OFTEN DO YOU HAVE A DRINK CONTAINING ALCOHOL: NEVER
AUDIT-C TOTAL SCORE: 0
HOW MANY STANDARD DRINKS CONTAINING ALCOHOL DO YOU HAVE ON A TYPICAL DAY: PATIENT DOES NOT DRINK

## 2025-04-02 ASSESSMENT — PATIENT HEALTH QUESTIONNAIRE - PHQ9
1. LITTLE INTEREST OR PLEASURE IN DOING THINGS: NOT AT ALL
SUM OF ALL RESPONSES TO PHQ9 QUESTIONS 1 & 2: 0
2. FEELING DOWN, DEPRESSED OR HOPELESS: NOT AT ALL

## 2025-04-02 ASSESSMENT — ACTIVITIES OF DAILY LIVING (ADL)
DRESSING YOURSELF: INDEPENDENT
PATIENT'S MEMORY ADEQUATE TO SAFELY COMPLETE DAILY ACTIVITIES?: YES
LACK_OF_TRANSPORTATION: NO
JUDGMENT_ADEQUATE_SAFELY_COMPLETE_DAILY_ACTIVITIES: YES
TOILETING: INDEPENDENT
FEEDING YOURSELF: INDEPENDENT
WALKS IN HOME: INDEPENDENT
ADEQUATE_TO_COMPLETE_ADL: YES
BATHING: INDEPENDENT
HEARING - LEFT EAR: FUNCTIONAL
ASSISTIVE_DEVICE: EYEGLASSES;DENTURES UPPER
GROOMING: INDEPENDENT
HEARING - RIGHT EAR: FUNCTIONAL

## 2025-04-02 ASSESSMENT — COLUMBIA-SUICIDE SEVERITY RATING SCALE - C-SSRS
6. HAVE YOU EVER DONE ANYTHING, STARTED TO DO ANYTHING, OR PREPARED TO DO ANYTHING TO END YOUR LIFE?: NO
1. IN THE PAST MONTH, HAVE YOU WISHED YOU WERE DEAD OR WISHED YOU COULD GO TO SLEEP AND NOT WAKE UP?: NO
2. HAVE YOU ACTUALLY HAD ANY THOUGHTS OF KILLING YOURSELF?: NO

## 2025-04-02 ASSESSMENT — PAIN SCALES - GENERAL
PAINLEVEL_OUTOF10: 0 - NO PAIN
PAINLEVEL_OUTOF10: 0 - NO PAIN

## 2025-04-02 NOTE — H&P
History Of Present Illness  Georgie Friedman is a 79 y.o. female from home with a PMH of obesity, HTN, stage IV CKD, right knee osteoarthritis, DM 2, depression, who presented with nausea, vomiting, diarrhea for the past 5 days.  Patient has had very poor oral intake for the past 5 days.  Patient did not have any abdominal pain during that time.  She is also complaining of bilateral lower extremity soreness.  Patient denies chest pain, constipation, fever, chills, cough, bleeding, urinary symptom, fall, head trauma, loss of consciousness, headache, dizziness    ED course: /78, HR 79, RR 18, febrile, O2 sat 96% on room air  Labs: Sodium 131, BUNs/creatinine 43/2.73, GFR 17, UA positive for leukocyte esterase, negative COVID, RSV, flu    Patient was given IV fluid hydration in the ER and was admitted for management of nausea, vomiting, diarrhea and MARIA R in the setting of poor oral intake and dehydration         Past Medical History  She has a past medical history of Diabetes mellitus (Multi), Hypomagnesemia (06/04/2021), and Renal disorder.    Surgical History  She has a past surgical history that includes Other surgical history (02/22/2021); Other surgical history (02/22/2021); Other surgical history (02/22/2021); Other surgical history (02/22/2021); Other surgical history (02/22/2021); Other surgical history (02/22/2021); Other surgical history (02/22/2021); Other surgical history (02/22/2021); Other surgical history (02/22/2021); and Other surgical history (02/22/2021).     Social History  She reports that she has never smoked. She has never used smokeless tobacco. She reports that she does not drink alcohol and does not use drugs.    Family History  Family History   Problem Relation Name Age of Onset    Stroke Mother      Heart failure Mother      Hypertension Mother      Heart failure Father      Heart attack Father      Hypertension Brother          Allergies  Jardiance [empagliflozin]    Review of  Systems  10 points ROS was negative except as mentioned per HPI     Physical Exam    General: Well-developed elderly female, in no acute distress  HEENT: AT, NC, no JVD, no lymphadenopathy, neck supple  Lungs: Clear, no wheezing, no crackles  Cardiac: Normal S1-S2, no murmur, no gallop  Abdomen: Soft, nontender, no distention, positive bowel sound  Extremities: No deformity, bilateral lower extremity +2 nonpitting edema noted, pulses intact, ROM intact  Neurological: Alert awake oriented x3, sensation intact, clear speech  Skin: Scar of right knee surgery noted         Last Recorded Vitals  /64 (BP Location: Left arm, Patient Position: Lying)   Pulse 72   Temp 36.2 °C (97.2 °F) (Temporal)   Resp 16   Wt 87.3 kg (192 lb 7.4 oz)   SpO2 94%       Assessment & Plan  MARIA R (acute kidney injury)        Georgie Friedman is a 79 y.o. female from home with a PMH of obesity, HTN, stage IV CKD, right knee osteoarthritis, DM 2, depression, who was admitted for management of following issues:      Nausea, vomiting, diarrhea: Negative COVID, RSV, flu, placed on IV fluid hydration, pain management and antiemetic as needed, follow-up on C. difficile and stool pathogen panel  Her symptoms already resolved  On clear liquid diet, will advance gradually  MARIA R/CKD: Continue IV fluid hydration, likely in the setting of diarrhea and dehydration, monitor renal function, avoid nephrotoxic agent  Mild UTI: Currently asymptomatic, will follow-up on urine culture results, no antibiotic at the moment  DM2: ISS, hypoglycemia protocol, POCT glucose, DM diet  Continue with home meds for other comorbidities  VTE prophylaxis: SCD, heparin subcu  Disposition: Home once hemodynamically stable        Herve Spivey MD

## 2025-04-02 NOTE — CARE PLAN
Problem: Pain - Adult  Goal: Verbalizes/displays adequate comfort level or baseline comfort level  Outcome: Progressing     Problem: Safety - Adult  Goal: Free from fall injury  Outcome: Progressing     Problem: Discharge Planning  Goal: Discharge to home or other facility with appropriate resources  Outcome: Progressing     Problem: Chronic Conditions and Co-morbidities  Goal: Patient's chronic conditions and co-morbidity symptoms are monitored and maintained or improved  Outcome: Progressing     Problem: Nutrition  Goal: Nutrient intake appropriate for maintaining nutritional needs  Outcome: Progressing    The clinical goals for the shift include no episodes of vomiting, improve PO intake

## 2025-04-02 NOTE — ED PROVIDER NOTES
Patient is a 79 year old female who presents to the emergency department with a chief complaint of nausea, vomiting and diarrhea for approximately 5 days.  Patient states that she has a yellow watery stool.  She was having abdominal cramping early on in her illness but currently denies any abdominal pain.  She denies a fever.  She has had some chills.  No chest pain or shortness of breath.  She states that her grandchildren had similar symptoms of nausea, vomiting and diarrhea but their symptoms lasted only a day or 2.           Review of Systems   Constitutional:  Negative for chills and fever.   HENT:  Negative for ear pain and sore throat.    Eyes:  Negative for pain and visual disturbance.   Respiratory:  Negative for cough and shortness of breath.    Cardiovascular:  Negative for chest pain and palpitations.   Gastrointestinal:  Negative for abdominal pain and vomiting.   Genitourinary:  Negative for dysuria and hematuria.   Musculoskeletal:  Negative for arthralgias and back pain.   Skin:  Negative for color change and rash.   Neurological:  Negative for seizures and syncope.   All other systems reviewed and are negative.       Physical Exam  Vitals and nursing note reviewed.   Constitutional:       General: She is not in acute distress.     Appearance: She is well-developed. She is not ill-appearing, toxic-appearing or diaphoretic.   HENT:      Head: Normocephalic and atraumatic.      Mouth/Throat:      Mouth: Mucous membranes are moist.   Eyes:      Extraocular Movements: Extraocular movements intact.      Conjunctiva/sclera: Conjunctivae normal.   Cardiovascular:      Rate and Rhythm: Normal rate and regular rhythm.      Heart sounds: No murmur heard.  Pulmonary:      Effort: Pulmonary effort is normal. No respiratory distress.      Breath sounds: Normal breath sounds. No stridor. No wheezing, rhonchi or rales.   Chest:      Chest wall: No tenderness.   Abdominal:      General: There is no distension.       Palpations: Abdomen is soft. There is no mass.      Tenderness: There is no abdominal tenderness. There is no guarding.   Musculoskeletal:         General: No swelling.      Cervical back: Normal range of motion and neck supple. No rigidity.   Skin:     General: Skin is warm and dry.      Capillary Refill: Capillary refill takes less than 2 seconds.   Neurological:      Mental Status: She is alert.   Psychiatric:         Mood and Affect: Mood normal.          Labs Reviewed   CBC WITH AUTO DIFFERENTIAL - Abnormal       Result Value    WBC 6.4      nRBC 0.0      RBC 4.38      Hemoglobin 12.2      Hematocrit 38.5      MCV 88      MCH 27.9      MCHC 31.7 (*)     RDW 13.9      Platelets 269      Neutrophils % 69.4      Immature Granulocytes %, Automated 0.5      Lymphocytes % 18.0      Monocytes % 11.3      Eosinophils % 0.6      Basophils % 0.2      Neutrophils Absolute 4.43      Immature Granulocytes Absolute, Automated 0.03      Lymphocytes Absolute 1.15      Monocytes Absolute 0.72      Eosinophils Absolute 0.04      Basophils Absolute 0.01     BASIC METABOLIC PANEL - Abnormal    Glucose 98      Sodium 131 (*)     Potassium 3.9      Chloride 103      Bicarbonate 17 (*)     Anion Gap 15      Urea Nitrogen 43 (*)     Creatinine 2.73 (*)     eGFR 17 (*)     Calcium 8.7     HEPATIC FUNCTION PANEL - Abnormal    Albumin 4.0      Bilirubin, Total 0.4      Bilirubin, Direct 0.1      Alkaline Phosphatase 87      ALT 26      AST 44 (*)     Total Protein 6.6     URINALYSIS WITH REFLEX CULTURE AND MICROSCOPIC - Abnormal    Color, Urine Light-Yellow      Appearance, Urine Turbid (*)     Specific Gravity, Urine 1.011      pH, Urine 5.5      Protein, Urine 20 (TRACE)      Glucose, Urine Normal      Blood, Urine NEGATIVE      Ketones, Urine NEGATIVE      Bilirubin, Urine NEGATIVE      Urobilinogen, Urine Normal      Nitrite, Urine NEGATIVE      Leukocyte Esterase, Urine 25 Michelle/uL (*)    MICROSCOPIC ONLY, URINE - Abnormal    WBC,  Urine 11-20 (*)     RBC, Urine 3-5      Squamous Epithelial Cells, Urine 1-9 (SPARSE)      Bacteria, Urine 1+ (*)     Mucus, Urine FEW     SARS-COV-2, INFLUENZA A/B AND RSV PCR - Normal    Coronavirus 2019, PCR Not Detected      Flu A Result Not Detected      Flu B Result Not Detected      RSV PCR Not Detected      Narrative:     This assay is an FDA-cleared, in vitro diagnostic nucleic acid amplification test for the qualitative detection and differentiation of SARS CoV-2/ Influenza A/B/ RSV from nasopharyngeal specimens collected from individuals with signs and symptoms of respiratory tract infections, and has been validated for use at [facility]. Negative results do not preclude COVID-19/ Influenza A/B/ RSV infections and should not be used as the sole basis for diagnosis, treatment, or other management decisions. Testing for SARS CoV-2 is recommended only for patients who meet current clinical and/or epidemiological criteria defined by federal, state, or local public health directives.   LIPASE - Normal    Lipase 36      Narrative:     Venipuncture immediately after or during the administration of Metamizole may lead to falsely low results. Testing should be performed immediately prior to Metamizole dosing.   STOOL PATHOGEN PANEL, PCR   C. DIFFICILE, PCR   URINE CULTURE   URINALYSIS WITH REFLEX CULTURE AND MICROSCOPIC    Narrative:     The following orders were created for panel order Urinalysis with Reflex Culture and Microscopic.  Procedure                               Abnormality         Status                     ---------                               -----------         ------                     Urinalysis with Reflex C...[[id number]]  Abnormal            Final result               Extra Urine Gray Tube[[id number]]                            In process                   Please view results for these tests on the individual orders.   EXTRA URINE GRAY TUBE        No orders to display         Procedures     Medical Decision Making  Patient is a 79-year-old female who presents with nausea, vomiting and diarrhea for 5 days. She denies any abdominal pain and abdomen is soft and nontender.  Patient was noted to have an acute kidney injury with creatinine today at 2.73. BUN is 43. Previous creatinine on 3/3 was 1.69 and prior creatinines were similar. Family members have had similar symptoms. UA is indeterminant and at this time no treatment indicated. UA has been sent for culture. Patient unable to provide a stool sample at this time. Given patient's MARIA R, she is recommended for admission. Patient was seen and evaluated by myself along with attending physician, Dr. Chery.    The patient was seen by the advanced practice provider.  I have personally performed a substantive portion of the encounter.  I have seen and examined the patient; agree with the workup, evaluation, MDM, management and diagnosis.  The care plan has been discussed.    I personally saw the patient and made/approved the management plan and take responsibility for the patient's management.   History: This 79-year-old white female presents to the ED with complaint of nausea vomiting and diarrhea for 5 days she states that yesterday she did have some abdominal pain symptoms but states that today her abdominal pain is resolved.  She states that her stool is yellow and watery.  She states that she has some chills without a fever.  Denies any chest pain or shortness of breath.  She states that her grandchildren had similar symptoms of nausea and diarrhea but her symptoms only lasted 1 or 2 days.  Exam: Gen: Alert oriented x 3 appears to be in no acute distress nontoxic.  ENT examinations unremarkable with moist mucous membranes.  Neck is supple without meningismus.  Heart regular rate and rhythm with an systolic ejection murmur.  Lungs are clear to auscultation bilaterally respirations are easy and symmetric without retractions or  tachypnea.  Abdomen is protuberant soft nontender with normal bowel sounds.  Skin is warm/dry intact out rash.  No focal neurologic deficits are appreciated.  MDM: Patient had lab work obtained that revealed contaminated urine without overt evidence of infection lipase is normal at 36.  BMP did reveal acute renal insufficiency with a BUN of 43 creatinine of 2.73 and a GFR of 17 sodium was low with 131 and bicarb was low at 17.  Testing for flu and RSV and COVID were negative.  AST was slightly elevated at 44 white cell count was normal 6.4 no left shift noted.  Due to the fact the patient had benign abdominal exam no imaging was performed.  The patient was treated with IV fluids and admitted to the hospital for acute renal insufficiency from dehydration from the diarrhea.  Patient was also ordered stool studies which were pending due to lack of collection of stool sample.  Garfield Chery DO     Amount and/or Complexity of Data Reviewed  Labs: ordered. Decision-making details documented in ED Course.    Risk  Decision regarding hospitalization.         Diagnoses as of 04/02/25 1321   MARIA R (acute kidney injury)   Nausea and vomiting, unspecified vomiting type   Diarrhea, unspecified type                    Lorraine Mccullough PA-C  04/02/25 1237       Garfield Chery DO  04/02/25 1321

## 2025-04-03 VITALS
WEIGHT: 192.46 LBS | TEMPERATURE: 97.3 F | OXYGEN SATURATION: 97 % | HEART RATE: 75 BPM | BODY MASS INDEX: 37.79 KG/M2 | HEIGHT: 60 IN | SYSTOLIC BLOOD PRESSURE: 113 MMHG | DIASTOLIC BLOOD PRESSURE: 70 MMHG | RESPIRATION RATE: 16 BRPM

## 2025-04-03 LAB
ANION GAP SERPL CALC-SCNC: 11 MMOL/L (ref 10–20)
BACTERIA UR CULT: NO GROWTH
BUN SERPL-MCNC: 34 MG/DL (ref 6–23)
C COLI+JEJ+UPSA DNA STL QL NAA+PROBE: NOT DETECTED
CALCIUM SERPL-MCNC: 7.9 MG/DL (ref 8.6–10.3)
CHLORIDE SERPL-SCNC: 112 MMOL/L (ref 98–107)
CO2 SERPL-SCNC: 18 MMOL/L (ref 21–32)
CREAT SERPL-MCNC: 2.09 MG/DL (ref 0.5–1.05)
EC STX1 GENE STL QL NAA+PROBE: NOT DETECTED
EC STX2 GENE STL QL NAA+PROBE: NOT DETECTED
EGFRCR SERPLBLD CKD-EPI 2021: 24 ML/MIN/1.73M*2
ERYTHROCYTE [DISTWIDTH] IN BLOOD BY AUTOMATED COUNT: 13.8 % (ref 11.5–14.5)
GLUCOSE BLD MANUAL STRIP-MCNC: 113 MG/DL (ref 74–99)
GLUCOSE BLD MANUAL STRIP-MCNC: 85 MG/DL (ref 74–99)
GLUCOSE SERPL-MCNC: 92 MG/DL (ref 74–99)
HCT VFR BLD AUTO: 33.3 % (ref 36–46)
HGB BLD-MCNC: 10.3 G/DL (ref 12–16)
MCH RBC QN AUTO: 27.8 PG (ref 26–34)
MCHC RBC AUTO-ENTMCNC: 30.9 G/DL (ref 32–36)
MCV RBC AUTO: 90 FL (ref 80–100)
NOROVIRUS GI + GII RNA STL NAA+PROBE: NOT DETECTED
NRBC BLD-RTO: 0 /100 WBCS (ref 0–0)
PLATELET # BLD AUTO: 234 X10*3/UL (ref 150–450)
POTASSIUM SERPL-SCNC: 4.1 MMOL/L (ref 3.5–5.3)
RBC # BLD AUTO: 3.7 X10*6/UL (ref 4–5.2)
RV RNA STL NAA+PROBE: DETECTED
SALMONELLA DNA STL QL NAA+PROBE: NOT DETECTED
SHIGELLA DNA SPEC QL NAA+PROBE: NOT DETECTED
SODIUM SERPL-SCNC: 137 MMOL/L (ref 136–145)
V CHOLERAE DNA STL QL NAA+PROBE: NOT DETECTED
WBC # BLD AUTO: 4 X10*3/UL (ref 4.4–11.3)
Y ENTEROCOL DNA STL QL NAA+PROBE: NOT DETECTED

## 2025-04-03 PROCEDURE — 96372 THER/PROPH/DIAG INJ SC/IM: CPT | Performed by: STUDENT IN AN ORGANIZED HEALTH CARE EDUCATION/TRAINING PROGRAM

## 2025-04-03 PROCEDURE — 2500000001 HC RX 250 WO HCPCS SELF ADMINISTERED DRUGS (ALT 637 FOR MEDICARE OP): Performed by: STUDENT IN AN ORGANIZED HEALTH CARE EDUCATION/TRAINING PROGRAM

## 2025-04-03 PROCEDURE — 82947 ASSAY GLUCOSE BLOOD QUANT: CPT

## 2025-04-03 PROCEDURE — G0378 HOSPITAL OBSERVATION PER HR: HCPCS

## 2025-04-03 PROCEDURE — 36415 COLL VENOUS BLD VENIPUNCTURE: CPT | Performed by: STUDENT IN AN ORGANIZED HEALTH CARE EDUCATION/TRAINING PROGRAM

## 2025-04-03 PROCEDURE — 80048 BASIC METABOLIC PNL TOTAL CA: CPT | Performed by: STUDENT IN AN ORGANIZED HEALTH CARE EDUCATION/TRAINING PROGRAM

## 2025-04-03 PROCEDURE — 2500000004 HC RX 250 GENERAL PHARMACY W/ HCPCS (ALT 636 FOR OP/ED): Performed by: STUDENT IN AN ORGANIZED HEALTH CARE EDUCATION/TRAINING PROGRAM

## 2025-04-03 PROCEDURE — 85027 COMPLETE CBC AUTOMATED: CPT | Performed by: STUDENT IN AN ORGANIZED HEALTH CARE EDUCATION/TRAINING PROGRAM

## 2025-04-03 PROCEDURE — 82374 ASSAY BLOOD CARBON DIOXIDE: CPT | Performed by: STUDENT IN AN ORGANIZED HEALTH CARE EDUCATION/TRAINING PROGRAM

## 2025-04-03 PROCEDURE — 2500000002 HC RX 250 W HCPCS SELF ADMINISTERED DRUGS (ALT 637 FOR MEDICARE OP, ALT 636 FOR OP/ED): Performed by: STUDENT IN AN ORGANIZED HEALTH CARE EDUCATION/TRAINING PROGRAM

## 2025-04-03 PROCEDURE — 99239 HOSP IP/OBS DSCHRG MGMT >30: CPT | Performed by: STUDENT IN AN ORGANIZED HEALTH CARE EDUCATION/TRAINING PROGRAM

## 2025-04-03 RX ADMIN — ROSUVASTATIN CALCIUM 10 MG: 5 TABLET, FILM COATED ORAL at 09:25

## 2025-04-03 RX ADMIN — SERTRALINE HYDROCHLORIDE 50 MG: 50 TABLET ORAL at 09:25

## 2025-04-03 RX ADMIN — HEPARIN SODIUM 5000 UNITS: 5000 INJECTION, SOLUTION INTRAVENOUS; SUBCUTANEOUS at 06:16

## 2025-04-03 RX ADMIN — SODIUM CHLORIDE 100 ML/HR: 9 INJECTION, SOLUTION INTRAVENOUS at 00:45

## 2025-04-03 RX ADMIN — ASPIRIN 81 MG: 81 TABLET, COATED ORAL at 09:25

## 2025-04-03 RX ADMIN — PANTOPRAZOLE SODIUM 40 MG: 40 TABLET, DELAYED RELEASE ORAL at 06:16

## 2025-04-03 RX ADMIN — NIFEDIPINE 60 MG: 60 TABLET, EXTENDED RELEASE ORAL at 09:25

## 2025-04-03 ASSESSMENT — COGNITIVE AND FUNCTIONAL STATUS - GENERAL
MOBILITY SCORE: 23
CLIMB 3 TO 5 STEPS WITH RAILING: A LITTLE
DAILY ACTIVITIY SCORE: 24

## 2025-04-03 ASSESSMENT — PAIN - FUNCTIONAL ASSESSMENT
PAIN_FUNCTIONAL_ASSESSMENT: 0-10

## 2025-04-03 ASSESSMENT — ACTIVITIES OF DAILY LIVING (ADL): LACK_OF_TRANSPORTATION: NO

## 2025-04-03 ASSESSMENT — PAIN SCALES - GENERAL
PAINLEVEL_OUTOF10: 0 - NO PAIN

## 2025-04-03 NOTE — PROGRESS NOTES
04/03/25 1517   Discharge Planning   Living Arrangements Children  (Daughter)   Support Systems Children;Family members   Assistance Needed None   Type of Residence Private residence   Number of Stairs to Enter Residence   (Mobile Home)   Number of Stairs Within Residence 0   Do you have animals or pets at home? Yes   Type of Animals or Pets 1 dog   Who is requesting discharge planning? Provider   Home or Post Acute Services None   Expected Discharge Disposition Home   Does the patient need discharge transport arranged? No   Financial Resource Strain   How hard is it for you to pay for the very basics like food, housing, medical care, and heating? Not hard   Housing Stability   In the last 12 months, was there a time when you were not able to pay the mortgage or rent on time? N   In the past 12 months, how many times have you moved where you were living? 0   At any time in the past 12 months, were you homeless or living in a shelter (including now)? N   Transportation Needs   In the past 12 months, has lack of transportation kept you from medical appointments or from getting medications? no   In the past 12 months, has lack of transportation kept you from meetings, work, or from getting things needed for daily living? No   Stroke Family Assessment   Stroke Family Assessment Needed No   Intensity of Service   Intensity of Service 0-30 min     Care Transitions: Met with patient in room, sitting up in chair. Role of TCC explained. Demographics and contacts verified. She resides at [facility] with daughter Azalia, feels safe there. PCP is Dr. England. Preferred pharmacy is [facility] delivered for long term medications and [facility] in [place] for immediate needs. Denies any difficulty obtaining/affording medications, takes as ordered. She is independent at home with ADL's and drives self. She has a cane and walker at home but does not currently use any DME. [facility] 24/24 per nursing. Discharge plan is return  home, denies any needs or in home services. Emiliana John RN/TCC    -1100 Patient reviewed in care round meeting this AM. ADOD possibly later today. No needs anticipated. Care team available upon request. Emiliana John RN

## 2025-04-03 NOTE — DISCHARGE SUMMARY
Discharge Diagnosis  MARIA R (acute kidney injury)    Issues Requiring Follow-Up      Test Results Pending At Discharge  Pending Labs       Order Current Status    Urine Culture In process            Hospital Course   79 y.o. female from home with a PMH of obesity, HTN, stage IV CKD, right knee osteoarthritis, DM 2, depression, who was admitted for nausea, vomiting, diarrhea.  Her symptoms have improved considerably not having nausea or vomiting anymore, diarrhea is improving, creatinine is improving, urine did show some leukocyte esterase but she is not reporting any symptoms, symptoms are most likely secondary to a viral syndrome that she caught from her grandkids.  C. difficile was also negative.  Will put her on regular diet and discharge in the evening if she tolerates.    Pertinent Physical Exam At Time of Discharge  Physical Exam  HEENT: AT, NC, no JVD, no lymphadenopathy, neck supple  Lungs: Clear, no wheezing, no crackles  Cardiac: Normal S1-S2, no murmur, no gallop  Abdomen: Soft, nontender, no distention, positive bowel sound  Extremities: No deformity, bilateral lower extremity +2 nonpitting edema noted, pulses intact, ROM intact  Neurological: Alert awake oriented x3, sensation intact, clear speech  Skin: Scar of right knee surgery noted     Home Medications     Medication List      CONTINUE taking these medications     Accu-Chek Softclix Lancets misc; Generic drug: lancets; TEST BLOOD SUGAR   ONE TIME DAILY   blood-glucose meter misc; Commonly known as: Accu-Chek Guide Me Glucose   Mtr; Inject 1 kit under the skin once daily.   DropSafe Alcohol Prep Pads; Generic drug: alcohol swabs; USE ONE TIME   DAILY AS DIRECTED     ASK your doctor about these medications     Accu-Chek Guide test strips; Generic drug: blood sugar diagnostic; TEST   BLOOD SUGAR ONE TIME DAILY   aspirin 81 mg EC tablet   cyanocobalamin 1,000 mcg tablet; Commonly known as: Vitamin B-12   diclofenac sodium 1 % gel; Commonly known as:  Voltaren; Apply 4.5 inches   (4 g) topically 4 times a day as needed (PAIN).   FERROUS GLUCONATE ORAL   hyaluronan 30 mg/2 mL injection; Commonly known as: Orthovisc; Inject 2   mL (30 mg) into the joint 1 (one) time per week for 3 doses. Inject intra   articular in left knee once a week for 3 weeks   lisinopril 40 mg tablet; Take 1 tablet (40 mg) by mouth once daily.   magnesium oxide 400 mg tablet; Commonly known as: Mag-Ox   multivitamin tablet   NIFEdipine ER 60 mg 24 hr tablet; Commonly known as: Adalat CC; Take 1   tablet (60 mg) by mouth once daily.   omeprazole 40 mg DR capsule; Commonly known as: PriLOSEC; Take 1 capsule   (40 mg) by mouth 2 times a day before meals. Do not crush or chew.   rosuvastatin 10 mg tablet; Commonly known as: Crestor; Take 1 tablet (10   mg) by mouth once daily.   sertraline 50 mg tablet; Commonly known as: Zoloft; Take 1 tablet (50   mg) by mouth once daily.       Outpatient Follow-Up  Future Appointments   Date Time Provider Department [place]   4/29/2025  8:45 AM Phil Minor MD [id number] [facility]   9/5/2025  9:20 AM Lindsey England MD [id number] None       Jericho Penn MD

## 2025-04-03 NOTE — NURSING NOTE
Discharge Note: 4/3/2025 1521 AVS and pt responsibilities reviewed with pt and copy given. MARIA R, dehydration, education reviewed with pt and information sheets given. Pt verbalizes understanding of instructions received, verbalizes understanding of when to seek medical attention, denies any home going or personal care needs. Denies further questions or concerns. Reviewed follow up appts with pt and verbalizes understanding. Pt states feels much better and is ready to go home. Michel DE

## 2025-04-03 NOTE — CARE PLAN
Problem: Pain - Adult  Goal: Verbalizes/displays adequate comfort level or baseline comfort level  Outcome: Progressing     Problem: Safety - Adult  Goal: Free from fall injury  Outcome: Met     Problem: Discharge Planning  Goal: Discharge to home or other facility with appropriate resources  Outcome: Progressing     Problem: Chronic Conditions and Co-morbidities  Goal: Patient's chronic conditions and co-morbidity symptoms are monitored and maintained or improved  Outcome: Progressing     Problem: Nutrition  Goal: Nutrient intake appropriate for maintaining nutritional needs  Outcome: Progressing   The patient's goals for the shift include      The clinical goals for the shift include Nausea and vomiting to be well controlled, tolerating current diet.    Over the shift, the patient did make progress toward the following goals.

## 2025-04-03 NOTE — PROGRESS NOTES
Medication Education     Medication education for Georgie Friedman was provided to the patient  for the following medication(s):  N/A      Medication education provided by a Pharmacist:  Other N/A    Identified potential barriers to education:  None    Method(s) of Education:  Verbal    An opportunity to ask questions and receive answers was provided.     Assessment of understanding the patient :  2= meets goals/outcomes    Additional Notes (if applicable): Patient was in for symptoms attributed to be from a viral condition likely passed from family member(s). Patient was in good spirits on discharge. No new medications upon discharge noted, patient to continue normal regimen after discharge.    Chris Ford, PharmD

## 2025-04-03 NOTE — CARE PLAN
Problem: Pain - Adult  Goal: Verbalizes/displays adequate comfort level or baseline comfort level  Outcome: Progressing     Problem: Discharge Planning  Goal: Discharge to home or other facility with appropriate resources  Outcome: Progressing     Problem: Chronic Conditions and Co-morbidities  Goal: Patient's chronic conditions and co-morbidity symptoms are monitored and maintained or improved  Outcome: Progressing     Problem: Nutrition  Goal: Nutrient intake appropriate for maintaining nutritional needs  Outcome: Progressing   The patient's goals for the shift include   no nausea or vomiting    The clinical goals for the shift include Nausea and vomiting to be well controlled, tolerating current diet. No falls, labs and vs wdl

## 2025-04-03 NOTE — NURSING NOTE
Discharge Note: 4/3/2025 3982 Discharged via wheelchair to private car accompanied by student nurse, personal belongings taken with pt, no distress noted, no complaints voiced. Michel DE

## 2025-04-04 ENCOUNTER — DOCUMENTATION (OUTPATIENT)
Age: 80
End: 2025-04-04
Payer: MEDICARE

## 2025-04-07 ENCOUNTER — PATIENT OUTREACH (OUTPATIENT)
Age: 80
End: 2025-04-07
Payer: MEDICARE

## 2025-04-07 NOTE — PROGRESS NOTES
Discharge Facility:   [facility]   Discharge Diagnosis: MARIA R (acute kidney injury)   Admission Date:  4/2/25   Discharge Date:  4/3/25     PCP Appointment Date:  4/11/25   Specialist Appointment Date: 4/29/25 ortho   Hospital Encounter and Summary Linked: Yes    ED to Hosp-Admission (Discharged) with Jericho Penn MD; Garfield Chery DO (04/02/2025)     Wrap Up  Wrap Up Additional Comments: Successful transition of care outreach with patient. Pt reports doing well at home since discharge. No med changes. pt eating and drinking without diffuculty.pt reports voiding without difficulty . . Pt denies CP and SOB. Patient denies any further discharge questions/needs at this time. Pt aware of my availability for non-emergent concerns. Contact info provided to patient (4/7/2025 10:36 AM)    Medications  Medications reviewed with patient/caregiver?: Yes (4/7/2025 10:36 AM)  Is the patient having any side effects they believe may be caused by any medication additions or changes?: No (4/7/2025 10:36 AM)  Does the patient have all medications ordered at discharge?: Yes (4/7/2025 10:36 AM)  Prescription Comments: No med changes (4/7/2025 10:36 AM)  Is the patient taking all medications as directed (includes completed medication regime)?: Yes (4/7/2025 10:36 AM)  Care Management Interventions: Provided patient education (4/7/2025 10:36 AM)  Medication Comments: Pt denies problems obtaining or affording medication. No medication-related issues identified (4/7/2025 10:36 AM)    Appointments  Does the patient have a primary care provider?: Yes (4/7/2025 10:36 AM)  Care Management Interventions: Verified appointment date/time/provider (4/7/2025 10:36 AM)  Has the patient kept scheduled appointments due by today?: Yes (4/7/2025 10:36 AM)  Care Management Interventions: Advised patient to keep appointment (4/7/2025 10:36 AM)    Self Management  What is the home health agency?: DENIES NEED (4/7/2025 10:36  AM)  What Durable Medical Equipment (DME) was ordered?: N/A (4/7/2025 10:36 AM)    Patient Teaching  Does the patient have access to their discharge instructions?: Yes (4/7/2025 10:36 AM)  Care Management Interventions: Reviewed instructions with patient (4/7/2025 10:36 AM)  What is the patient's perception of their health status since discharge?: Improving (4/7/2025 10:36 AM)  Is the patient/caregiver able to teach back the hierarchy of who to call/visit for symptoms/problems? PCP, Specialist, Home Health nurse, Urgent Care, ED, 911: Yes (4/7/2025 10:36 AM)

## 2025-04-11 ENCOUNTER — OFFICE VISIT (OUTPATIENT)
Age: 80
End: 2025-04-11
Payer: MEDICARE

## 2025-04-11 VITALS
BODY MASS INDEX: 39.07 KG/M2 | SYSTOLIC BLOOD PRESSURE: 130 MMHG | DIASTOLIC BLOOD PRESSURE: 78 MMHG | HEIGHT: 60 IN | WEIGHT: 199 LBS | HEART RATE: 76 BPM

## 2025-04-11 DIAGNOSIS — N18.4 STAGE 4 CHRONIC KIDNEY DISEASE (MULTI): ICD-10-CM

## 2025-04-11 DIAGNOSIS — R82.90 ABNORMAL URINALYSIS: ICD-10-CM

## 2025-04-11 DIAGNOSIS — N17.9 ACUTE RENAL FAILURE, UNSPECIFIED ACUTE RENAL FAILURE TYPE: ICD-10-CM

## 2025-04-11 DIAGNOSIS — R11.10 VOMITING AND DIARRHEA: ICD-10-CM

## 2025-04-11 DIAGNOSIS — N18.32 TYPE 2 DIABETES MELLITUS WITH STAGE 3B CHRONIC KIDNEY DISEASE, WITHOUT LONG-TERM CURRENT USE OF INSULIN (MULTI): Primary | ICD-10-CM

## 2025-04-11 DIAGNOSIS — R19.7 VOMITING AND DIARRHEA: ICD-10-CM

## 2025-04-11 DIAGNOSIS — N17.9 AKI (ACUTE KIDNEY INJURY): ICD-10-CM

## 2025-04-11 DIAGNOSIS — E11.22 TYPE 2 DIABETES MELLITUS WITH STAGE 3B CHRONIC KIDNEY DISEASE, WITHOUT LONG-TERM CURRENT USE OF INSULIN (MULTI): Primary | ICD-10-CM

## 2025-04-11 PROCEDURE — 1036F TOBACCO NON-USER: CPT | Performed by: FAMILY MEDICINE

## 2025-04-11 PROCEDURE — 99495 TRANSJ CARE MGMT MOD F2F 14D: CPT | Performed by: FAMILY MEDICINE

## 2025-04-11 PROCEDURE — 1159F MED LIST DOCD IN RCRD: CPT | Performed by: FAMILY MEDICINE

## 2025-04-11 PROCEDURE — 3078F DIAST BP <80 MM HG: CPT | Performed by: FAMILY MEDICINE

## 2025-04-11 PROCEDURE — 1160F RVW MEDS BY RX/DR IN RCRD: CPT | Performed by: FAMILY MEDICINE

## 2025-04-11 PROCEDURE — 3075F SYST BP GE 130 - 139MM HG: CPT | Performed by: FAMILY MEDICINE

## 2025-04-12 LAB
ANION GAP SERPL CALCULATED.4IONS-SCNC: 10 MMOL/L (CALC) (ref 7–17)
BUN SERPL-MCNC: 23 MG/DL (ref 7–25)
BUN/CREAT SERPL: 15 (CALC) (ref 6–22)
CALCIUM SERPL-MCNC: 8.6 MG/DL (ref 8.6–10.4)
CHLORIDE SERPL-SCNC: 108 MMOL/L (ref 98–110)
CO2 SERPL-SCNC: 24 MMOL/L (ref 20–32)
CREAT SERPL-MCNC: 1.49 MG/DL (ref 0.6–1)
EGFRCR SERPLBLD CKD-EPI 2021: 36 ML/MIN/1.73M2
GLUCOSE SERPL-MCNC: 85 MG/DL (ref 65–99)
POTASSIUM SERPL-SCNC: 4.3 MMOL/L (ref 3.5–5.3)
SODIUM SERPL-SCNC: 142 MMOL/L (ref 135–146)

## 2025-04-13 LAB — BACTERIA UR CULT: NORMAL

## 2025-04-14 LAB
ALBUMIN/CREAT UR: 18 MG/G CREAT
CREAT UR-MCNC: 142 MG/DL (ref 20–275)
MICROALBUMIN UR-MCNC: 2.5 MG/DL

## 2025-04-16 PROCEDURE — RXMED WILLOW AMBULATORY MEDICATION CHARGE

## 2025-04-17 ENCOUNTER — PATIENT OUTREACH (OUTPATIENT)
Age: 80
End: 2025-04-17
Payer: MEDICARE

## 2025-04-17 NOTE — PROGRESS NOTES
Unable to reach patient for follow up call after recent hospitalization.   Left voicemail with call back number for patient to call if needed   If no voicemail available call attempts x 2 were made to contact the patient to assist with any questions or concerns patient may have.

## 2025-04-18 DIAGNOSIS — E11.22 TYPE 2 DIABETES MELLITUS WITH STAGE 3B CHRONIC KIDNEY DISEASE, WITHOUT LONG-TERM CURRENT USE OF INSULIN (MULTI): Primary | ICD-10-CM

## 2025-04-18 DIAGNOSIS — N18.32 TYPE 2 DIABETES MELLITUS WITH STAGE 3B CHRONIC KIDNEY DISEASE, WITHOUT LONG-TERM CURRENT USE OF INSULIN (MULTI): Primary | ICD-10-CM

## 2025-04-22 ENCOUNTER — PREP FOR PROCEDURE (OUTPATIENT)
Dept: ORTHOPEDIC SURGERY | Facility: CLINIC | Age: 80
End: 2025-04-22
Payer: MEDICARE

## 2025-04-22 ENCOUNTER — SPECIALTY PHARMACY (OUTPATIENT)
Dept: PHARMACY | Facility: CLINIC | Age: 80
End: 2025-04-22

## 2025-04-24 DIAGNOSIS — M25.562 CHRONIC PAIN OF LEFT KNEE: ICD-10-CM

## 2025-04-24 DIAGNOSIS — G89.29 CHRONIC PAIN OF LEFT KNEE: ICD-10-CM

## 2025-04-25 ENCOUNTER — PHARMACY VISIT (OUTPATIENT)
Dept: PHARMACY | Facility: CLINIC | Age: 80
End: 2025-04-25
Payer: COMMERCIAL

## 2025-04-28 ENCOUNTER — SPECIALTY PHARMACY (OUTPATIENT)
Dept: PHARMACY | Facility: CLINIC | Age: 80
End: 2025-04-28

## 2025-04-29 ENCOUNTER — OFFICE VISIT (OUTPATIENT)
Dept: ORTHOPEDIC SURGERY | Facility: CLINIC | Age: 80
End: 2025-04-29
Payer: MEDICARE

## 2025-04-29 ENCOUNTER — HOSPITAL ENCOUNTER (OUTPATIENT)
Dept: RADIOLOGY | Facility: EXTERNAL LOCATION | Age: 80
Discharge: HOME | End: 2025-04-29

## 2025-04-29 ENCOUNTER — APPOINTMENT (OUTPATIENT)
Dept: ORTHOPEDIC SURGERY | Facility: CLINIC | Age: 80
End: 2025-04-29
Payer: MEDICARE

## 2025-04-29 DIAGNOSIS — G89.29 CHRONIC PAIN OF LEFT KNEE: ICD-10-CM

## 2025-04-29 DIAGNOSIS — M17.12 ARTHRITIS OF KNEE, LEFT: ICD-10-CM

## 2025-04-29 DIAGNOSIS — M25.562 CHRONIC PAIN OF LEFT KNEE: ICD-10-CM

## 2025-04-29 PROCEDURE — 20611 DRAIN/INJ JOINT/BURSA W/US: CPT | Performed by: SPECIALIST

## 2025-04-29 PROCEDURE — 1036F TOBACCO NON-USER: CPT | Performed by: SPECIALIST

## 2025-04-29 PROCEDURE — 1160F RVW MEDS BY RX/DR IN RCRD: CPT | Performed by: SPECIALIST

## 2025-04-29 PROCEDURE — 1159F MED LIST DOCD IN RCRD: CPT | Performed by: SPECIALIST

## 2025-04-29 ASSESSMENT — PAIN - FUNCTIONAL ASSESSMENT: PAIN_FUNCTIONAL_ASSESSMENT: NO/DENIES PAIN

## 2025-04-29 NOTE — PROGRESS NOTES
Assessment/Plan   Encounter Diagnoses:  Arthritis of knee, left  Arthritis of knee, left  Assessment: Left knee osteoarthritis    Plan:  Orthovisc 1 of 3.  Follow-up in 1 week for reassessment and possible 2 of 3.       Subjective    Patient ID: Georgie Friedman is a 79 y.o. female.    Chief Complaint: Injections of the Left Knee     Last Surgery: No surgery found  Last Surgery Date: No surgery found    HPI  79-year-old who comes in today for her first viscosupplementation injection.    OBJECTIVE: ORTHO EXAM    Left knee:  Skin healthy and intact  No gross swelling or ecchymosis  Alignment: Varus  Effusion: Mild  ROM: 0 degrees Extension   120 degrees Flexion  Minimal crepitance with range of motion  No pain with internal rotation of the hip  Tenderness to palpation: Medial joint line     15 degrees laxity to valgus stress  Neutral laxity to varus stress  Negative Lachman´s test  Negative posterior drawer test  Mild pain with Jeremiah´s test     Neurovascular exam normal distally  2+ DP pulse and good cap refill    IMAGE RESULTS:  Point of Care Ultrasound  These images are not reportable by radiology and will not be interpreted   by  Radiologists.      ULTRASOUND      L Inj/Asp: L knee on 4/29/2025 8:48 AM  Indications: pain  Details: 18 G needle, ultrasound-guided superolateral approach  Medications: 30 mg hyaluronan 30 mg/2 mL  Procedure, treatment alternatives, risks and benefits explained, specific risks discussed. Immediately prior to procedure a time out was called to verify the correct patient, procedure, equipment, support staff and site/side marked as required. Patient was prepped and draped in the usual sterile fashion.       She was noted to have a mild to moderate effusion    Orders Placed This Encounter    Point of Care Ultrasound

## 2025-04-29 NOTE — ASSESSMENT & PLAN NOTE
Assessment: Left knee osteoarthritis    Plan:  Euflexxa 1 of 3.  Follow-up in 1 week for reassessment and possible 2 of 3.

## 2025-05-06 ENCOUNTER — HOSPITAL ENCOUNTER (OUTPATIENT)
Dept: RADIOLOGY | Facility: EXTERNAL LOCATION | Age: 80
Discharge: HOME | End: 2025-05-06

## 2025-05-06 ENCOUNTER — APPOINTMENT (OUTPATIENT)
Dept: ORTHOPEDIC SURGERY | Facility: CLINIC | Age: 80
End: 2025-05-06
Payer: MEDICARE

## 2025-05-06 DIAGNOSIS — M25.562 CHRONIC PAIN OF LEFT KNEE: ICD-10-CM

## 2025-05-06 DIAGNOSIS — G89.29 CHRONIC PAIN OF LEFT KNEE: ICD-10-CM

## 2025-05-06 PROCEDURE — 1125F AMNT PAIN NOTED PAIN PRSNT: CPT | Performed by: SPECIALIST

## 2025-05-06 PROCEDURE — 20611 DRAIN/INJ JOINT/BURSA W/US: CPT | Performed by: SPECIALIST

## 2025-05-06 PROCEDURE — 1160F RVW MEDS BY RX/DR IN RCRD: CPT | Performed by: SPECIALIST

## 2025-05-06 PROCEDURE — 1159F MED LIST DOCD IN RCRD: CPT | Performed by: SPECIALIST

## 2025-05-06 PROCEDURE — 1036F TOBACCO NON-USER: CPT | Performed by: SPECIALIST

## 2025-05-06 ASSESSMENT — PAIN SCALES - GENERAL: PAINLEVEL_OUTOF10: 3

## 2025-05-06 ASSESSMENT — PAIN DESCRIPTION - DESCRIPTORS: DESCRIPTORS: DULL

## 2025-05-06 ASSESSMENT — PAIN - FUNCTIONAL ASSESSMENT: PAIN_FUNCTIONAL_ASSESSMENT: 0-10

## 2025-05-06 NOTE — PROGRESS NOTES
Assessment/Plan   Encounter Diagnoses:  Chronic pain of left knee  Arthritis of knee, left  Assessment: Left knee osteoarthritis    Plan:  Orthovisc 1 of 3.  Follow-up in 1 week for reassessment and possible 2 of 3.       Subjective    Patient ID: Georgie Friedman is a 79 y.o. female.    Chief Complaint: Injections of the Left Knee (ORTHOVISC #2 5/6/25)     Last Surgery: No surgery found  Last Surgery Date: No surgery found    HPI  79-year-old who comes in today for her first viscosupplementation injection.    OBJECTIVE: ORTHO EXAM    Left knee:  Skin healthy and intact  No gross swelling or ecchymosis  Alignment: Varus  Effusion: Mild  ROM: 0 degrees Extension   120 degrees Flexion  Minimal crepitance with range of motion  No pain with internal rotation of the hip  Tenderness to palpation: Medial joint line     15 degrees laxity to valgus stress  Neutral laxity to varus stress  Negative Lachman´s test  Negative posterior drawer test  Mild pain with Jeremiah´s test     Neurovascular exam normal distally  2+ DP pulse and good cap refill    IMAGE RESULTS:  Point of Care Ultrasound  These images are not reportable by radiology and will not be interpreted   by  Radiologists.      ULTRASOUND      L Inj/Asp: L knee on 5/6/2025 9:38 AM  Indications: pain  Details: 18 G needle, ultrasound-guided superolateral approach  Medications: 30 mg hyaluronan 30 mg/2 mL  Procedure, treatment alternatives, risks and benefits explained, specific risks discussed. Immediately prior to procedure a time out was called to verify the correct patient, procedure, equipment, support staff and site/side marked as required. Patient was prepped and draped in the usual sterile fashion.       She was noted to have a mild to moderate effusion    Orders Placed This Encounter    Point of Care Ultrasound

## 2025-05-06 NOTE — ASSESSMENT & PLAN NOTE
Assessment: Left knee osteoarthritis    Plan:  Euflexxa 2 of 3.  Follow-up in 1 week for reassessment and possible 3 of 3.

## 2025-05-13 ENCOUNTER — APPOINTMENT (OUTPATIENT)
Dept: ORTHOPEDIC SURGERY | Facility: CLINIC | Age: 80
End: 2025-05-13
Payer: MEDICARE

## 2025-05-13 DIAGNOSIS — M17.12 ARTHRITIS OF KNEE, LEFT: ICD-10-CM

## 2025-05-14 ENCOUNTER — HOSPITAL ENCOUNTER (OUTPATIENT)
Dept: RADIOLOGY | Facility: EXTERNAL LOCATION | Age: 80
Discharge: HOME | End: 2025-05-14

## 2025-05-14 ENCOUNTER — APPOINTMENT (OUTPATIENT)
Dept: ORTHOPEDIC SURGERY | Facility: CLINIC | Age: 80
End: 2025-05-14
Payer: MEDICARE

## 2025-05-14 DIAGNOSIS — M17.11 PRIMARY OSTEOARTHRITIS OF RIGHT KNEE: ICD-10-CM

## 2025-05-14 PROCEDURE — 1159F MED LIST DOCD IN RCRD: CPT | Performed by: SPECIALIST

## 2025-05-14 PROCEDURE — 1160F RVW MEDS BY RX/DR IN RCRD: CPT | Performed by: SPECIALIST

## 2025-05-14 PROCEDURE — 1036F TOBACCO NON-USER: CPT | Performed by: SPECIALIST

## 2025-05-14 PROCEDURE — 20611 DRAIN/INJ JOINT/BURSA W/US: CPT | Performed by: SPECIALIST

## 2025-05-14 ASSESSMENT — PAIN - FUNCTIONAL ASSESSMENT: PAIN_FUNCTIONAL_ASSESSMENT: 0-10

## 2025-05-14 ASSESSMENT — PAIN SCALES - GENERAL: PAINLEVEL_OUTOF10: 0 - NO PAIN

## 2025-05-14 NOTE — ASSESSMENT & PLAN NOTE
Assessment: Left knee osteoarthritis    Plan:  Euflexxa 3 of 3.  Follow-up in 3 months for reassessment.

## 2025-05-14 NOTE — PROGRESS NOTES
Assessment/Plan   Encounter Diagnoses:  Primary osteoarthritis of right knee  Arthritis of knee, left  Assessment: Left knee osteoarthritis    Plan:  Orthovisc 1 of 3.  Follow-up in 1 week for reassessment and possible 2 of 3.       Subjective    Patient ID: Georgie Friedman is a 79 y.o. female.    Chief Complaint: Injections of the Left Knee (ORTHOVISC #3)     Last Surgery: No surgery found  Last Surgery Date: No surgery found    HPI  79-year-old who comes in today for her third viscosupplementation injection.    OBJECTIVE: ORTHO EXAM    Left knee:  Skin healthy and intact  No gross swelling or ecchymosis  Alignment: Varus  Effusion: Mild  ROM: 0 degrees Extension   120 degrees Flexion  Minimal crepitance with range of motion  No pain with internal rotation of the hip  Tenderness to palpation: Medial joint line     15 degrees laxity to valgus stress  Neutral laxity to varus stress  Negative Lachman´s test  Negative posterior drawer test  Mild pain with Jeremiah´s test     Neurovascular exam normal distally  2+ DP pulse and good cap refill    IMAGE RESULTS:  Point of Care Ultrasound  These images are not reportable by radiology and will not be interpreted   by  Radiologists.      ULTRASOUND      L Inj/Asp: L knee on 5/14/2025 10:28 AM  Indications: pain  Details: 18 G needle, ultrasound-guided superolateral approach  Medications: 30 mg hyaluronan 30 mg/2 mL  Procedure, treatment alternatives, risks and benefits explained, specific risks discussed. Immediately prior to procedure a time out was called to verify the correct patient, procedure, equipment, support staff and site/side marked as required. Patient was prepped and draped in the usual sterile fashion.       She was noted to have a mild to moderate effusion    Orders Placed This Encounter    Point of Care Ultrasound

## 2025-06-12 ENCOUNTER — OFFICE VISIT (OUTPATIENT)
Age: 80
End: 2025-06-12
Payer: MEDICARE

## 2025-06-12 VITALS
HEART RATE: 80 BPM | WEIGHT: 198 LBS | BODY MASS INDEX: 38.87 KG/M2 | SYSTOLIC BLOOD PRESSURE: 124 MMHG | HEIGHT: 60 IN | DIASTOLIC BLOOD PRESSURE: 78 MMHG

## 2025-06-12 DIAGNOSIS — L28.2 PRURITIC RASH: Primary | ICD-10-CM

## 2025-06-12 PROCEDURE — 3074F SYST BP LT 130 MM HG: CPT | Performed by: FAMILY MEDICINE

## 2025-06-12 PROCEDURE — 1160F RVW MEDS BY RX/DR IN RCRD: CPT | Performed by: FAMILY MEDICINE

## 2025-06-12 PROCEDURE — 1159F MED LIST DOCD IN RCRD: CPT | Performed by: FAMILY MEDICINE

## 2025-06-12 PROCEDURE — G2211 COMPLEX E/M VISIT ADD ON: HCPCS | Performed by: FAMILY MEDICINE

## 2025-06-12 PROCEDURE — 1036F TOBACCO NON-USER: CPT | Performed by: FAMILY MEDICINE

## 2025-06-12 PROCEDURE — 3078F DIAST BP <80 MM HG: CPT | Performed by: FAMILY MEDICINE

## 2025-06-12 PROCEDURE — 99213 OFFICE O/P EST LOW 20 MIN: CPT | Performed by: FAMILY MEDICINE

## 2025-06-12 RX ORDER — PREDNISONE 20 MG/1
40 TABLET ORAL DAILY
Qty: 10 TABLET | Refills: 0 | Status: SHIPPED | OUTPATIENT
Start: 2025-06-12 | End: 2025-06-17

## 2025-06-12 NOTE — PROGRESS NOTES
Subjective  Georgie Friedman is a 79 y.o. female who presents for Rash.  HPI  Pruritic rash since last night, under breasts and right shoulder.  Prior to this, was applying flea  medicine to her dog.  She is having any breathing issues.  She has no fevers or systemtic systems.  She has a possible measles flag on her chart for possible exposure.  She has no idea what that is about , has no known exposures, had measles as a kid and has no other symptoms, no cough, no coryza, no conjunctivitis.  She states no travel.  Only goes here and kidney doctor.   It looks like there is a flaw in epic as all my appts tomorrow have a flag for possible measles exposure. This rash does not look like measles.  Review of Systems   All other systems reviewed and are negative.  .    Allergies[1]    Medications Ordered Prior to Encounter[2]    Problem List[3]    Objective     Visit Vitals  /78 (BP Location: Left arm, Patient Position: Sitting)   Pulse 80      Physical Exam  Vitals reviewed.   HENT:      Head: Normocephalic.      Nose: No rhinorrhea.   Eyes:      Conjunctiva/sclera: Conjunctivae normal.   Pulmonary:      Effort: Pulmonary effort is normal. No respiratory distress.   Skin:     Comments: Under left breast, upper abdomen and right posterior shoulder areas of wheal and flare type lesions, no vesicles, pink, no red brown of confluent lesions   Neurological:      Mental Status: She is alert.         Assessment/Plan   Problem List Items Addressed This Visit    None  Visit Diagnoses         Pruritic rash    -  Primary    Relevant Medications    predniSONE (Deltasone) 20 mg tablet                 Call concerns.  Side effects discussed. Can take benadryl as directed.   Lindsey England MD        [1]   Allergies  Allergen Reactions    Jardiance [Empagliflozin] Other     Yeast infection    [2]   Current Outpatient Medications on File Prior to Visit   Medication Sig Dispense Refill    Accu-Chek Guide test strips strip TEST  BLOOD SUGAR ONE TIME DAILY 100 strip 3    Accu-Chek Softclix Lancets [facility] [place] TEST BLOOD SUGAR ONE TIME DAILY 100 each 3    aspirin 81 mg EC tablet Take 1 tablet (81 mg) by mouth once daily.      blood-glucose meter (Accu-Chek Guide Me Glucose Mtr) misc Inject 1 kit under the skin once daily. 1 each 0    cyanocobalamin (Vitamin B-12) 1,000 mcg tablet Take 1 tablet (1,000 mcg) by mouth once daily.      diclofenac sodium (Voltaren) 1 % gel Apply 4.5 inches (4 g) topically 4 times a day as needed (PAIN). 100 g 1    DropSafe Alcohol Prep Pads pads, medicated USE ONE TIME DAILY AS DIRECTED 100 each 3    FERROUS GLUCONATE ORAL Take 45 mg by mouth once daily.      lisinopril 40 mg tablet Take 1 tablet (40 mg) by mouth once daily. 90 tablet 3    magnesium oxide (Mag-Ox) 400 mg tablet 1 tablet (400 mg) once daily.      multivitamin tablet Take by mouth.      NIFEdipine ER (NIFEdipine XL) 60 mg 24 hr tablet Take 1 tablet (60 mg) by mouth once daily. 90 tablet 3    omeprazole (PriLOSEC) 40 mg DR capsule Take 1 capsule (40 mg) by mouth 2 times a day before meals. Do not crush or chew. 180 capsule 3    rosuvastatin (Crestor) 10 mg tablet Take 1 tablet (10 mg) by mouth once daily. 90 tablet 3    sertraline (Zoloft) 50 mg tablet Take 1 tablet (50 mg) by mouth once daily. 90 tablet 3     No current facility-administered medications on file prior to visit.   [3]   Patient Active Problem List  Diagnosis    Acid reflux    Anemia    Type 2 diabetes mellitus with stage 3b chronic kidney disease, without long-term current use of insulin (Multi)    Other hyperlipidemia    Hypertension    Hypomagnesemia    Major depression, recurrent    Osteopenia    Severe sleep apnea    Stage 4 chronic kidney disease (Multi)    Arthritis of knee, left    Chronic kidney disease (CKD), stage III (moderate) (Multi)    MARIA R (acute kidney injury)

## 2025-07-24 ENCOUNTER — TELEPHONE (OUTPATIENT)
Age: 80
End: 2025-07-24
Payer: MEDICARE

## 2025-07-24 DIAGNOSIS — R11.2 NAUSEA AND VOMITING, UNSPECIFIED VOMITING TYPE: Primary | ICD-10-CM

## 2025-07-24 RX ORDER — ONDANSETRON 8 MG/1
8 TABLET, ORALLY DISINTEGRATING ORAL EVERY 8 HOURS PRN
Qty: 20 TABLET | Refills: 0 | Status: SHIPPED | OUTPATIENT
Start: 2025-07-24 | End: 2025-07-31

## 2025-07-24 NOTE — TELEPHONE ENCOUNTER
SHANE stating she is having diarrhea and vomiting; unable to leave house. Wonders if there is anything TESSA could send in for her to take?

## 2025-07-28 ENCOUNTER — OFFICE VISIT (OUTPATIENT)
Age: 80
End: 2025-07-28
Payer: MEDICARE

## 2025-07-28 VITALS
WEIGHT: 195 LBS | BODY MASS INDEX: 38.28 KG/M2 | SYSTOLIC BLOOD PRESSURE: 138 MMHG | HEART RATE: 88 BPM | DIASTOLIC BLOOD PRESSURE: 82 MMHG | HEIGHT: 60 IN

## 2025-07-28 DIAGNOSIS — R30.9 PAINFUL URINATION: ICD-10-CM

## 2025-07-28 DIAGNOSIS — R82.90 ABNORMAL URINALYSIS: Primary | ICD-10-CM

## 2025-07-28 LAB
POC APPEARANCE, URINE: CLEAR
POC BILIRUBIN, URINE: NEGATIVE
POC BLOOD, URINE: ABNORMAL
POC COLOR, URINE: YELLOW
POC GLUCOSE, URINE: NEGATIVE MG/DL
POC KETONES, URINE: NEGATIVE MG/DL
POC LEUKOCYTES, URINE: ABNORMAL
POC NITRITE,URINE: NEGATIVE
POC PH, URINE: 6 PH
POC PROTEIN, URINE: NEGATIVE MG/DL
POC SPECIFIC GRAVITY, URINE: 1.01
POC UROBILINOGEN, URINE: 0.2 EU/DL

## 2025-07-28 PROCEDURE — 1160F RVW MEDS BY RX/DR IN RCRD: CPT | Performed by: FAMILY MEDICINE

## 2025-07-28 PROCEDURE — 3075F SYST BP GE 130 - 139MM HG: CPT | Performed by: FAMILY MEDICINE

## 2025-07-28 PROCEDURE — 3079F DIAST BP 80-89 MM HG: CPT | Performed by: FAMILY MEDICINE

## 2025-07-28 PROCEDURE — 99213 OFFICE O/P EST LOW 20 MIN: CPT | Performed by: FAMILY MEDICINE

## 2025-07-28 PROCEDURE — G2211 COMPLEX E/M VISIT ADD ON: HCPCS | Performed by: FAMILY MEDICINE

## 2025-07-28 PROCEDURE — 81003 URINALYSIS AUTO W/O SCOPE: CPT | Performed by: FAMILY MEDICINE

## 2025-07-28 PROCEDURE — 1036F TOBACCO NON-USER: CPT | Performed by: FAMILY MEDICINE

## 2025-07-28 PROCEDURE — 1159F MED LIST DOCD IN RCRD: CPT | Performed by: FAMILY MEDICINE

## 2025-07-28 RX ORDER — CEPHALEXIN 500 MG/1
500 CAPSULE ORAL 2 TIMES DAILY
Qty: 14 CAPSULE | Refills: 0 | Status: SHIPPED | OUTPATIENT
Start: 2025-07-28 | End: 2025-08-04

## 2025-07-28 NOTE — PROGRESS NOTES
Subjective  Georgie Friedman is a 79 y.o. female who presents for Painful urination.  HPI  Vomiting and diarrhea resolved.  2-3 days of burning with urination, had some right flank pain, that seems better, denies fevers/chills.   Review of Systems.    Allergies[1]    Medications Ordered Prior to Encounter[2]    Problem List[3]    Objective     Visit Vitals  /82 (BP Location: Left arm, Patient Position: Sitting)   Pulse 88      Physical Exam    Assessment/Plan   Problem List Items Addressed This Visit    None  Visit Diagnoses         Abnormal urinalysis    -  Primary    Relevant Medications    cephalexin (Keflex) 500 mg capsule    Other Relevant Orders    Urine Culture      Painful urination        Relevant Medications    cephalexin (Keflex) 500 mg capsule    Other Relevant Orders    POCT UA Automated manually resulted (Completed)                   Lindsey England MD          [1]   Allergies  Allergen Reactions    Jardiance [Empagliflozin] Other     Yeast infection    [2]   Current Outpatient Medications on File Prior to Visit   Medication Sig Dispense Refill    Accu-Chek Guide test strips strip TEST BLOOD SUGAR ONE TIME DAILY 100 strip 3    Accu-Chek Softclix Lancets misc TEST BLOOD SUGAR ONE TIME DAILY 100 each 3    aspirin 81 mg EC tablet Take 1 tablet (81 mg) by mouth once daily.      blood-glucose meter (Accu-Chek Guide Me Glucose Mtr) misc Inject 1 kit under the skin once daily. 1 each 0    cyanocobalamin (Vitamin B-12) 1,000 mcg tablet Take 1 tablet (1,000 mcg) by mouth once daily.      diclofenac sodium (Voltaren) 1 % gel Apply 4.5 inches (4 g) topically 4 times a day as needed (PAIN). 100 g 1    DropSafe Alcohol Prep Pads pads, medicated USE ONE TIME DAILY AS DIRECTED 100 each 3    FERROUS GLUCONATE ORAL Take 45 mg by mouth once daily.      lisinopril 40 mg tablet Take 1 tablet (40 mg) by mouth once daily. 90 tablet 3    magnesium oxide (Mag-Ox) 400 mg tablet 1 tablet (400 mg) once daily.       multivitamin tablet Take by mouth.      NIFEdipine ER (NIFEdipine XL) 60 mg 24 hr tablet Take 1 tablet (60 mg) by mouth once daily. 90 tablet 3    omeprazole (PriLOSEC) 40 mg DR capsule Take 1 capsule (40 mg) by mouth 2 times a day before meals. Do not crush or chew. 180 capsule 3    ondansetron ODT (Zofran-ODT) 8 mg disintegrating tablet Dissolve 1 tablet (8 mg) in the mouth every 8 hours if needed for nausea or vomiting for up to 7 days. 20 tablet 0    rosuvastatin (Crestor) 10 mg tablet Take 1 tablet (10 mg) by mouth once daily. 90 tablet 3    sertraline (Zoloft) 50 mg tablet Take 1 tablet (50 mg) by mouth once daily. 90 tablet 3     No current facility-administered medications on file prior to visit.   [3]   Patient Active Problem List  Diagnosis    Acid reflux    Anemia    Type 2 diabetes mellitus with stage 3b chronic kidney disease, without long-term current use of insulin (Multi)    Other hyperlipidemia    Hypertension    Hypomagnesemia    Major depression, recurrent    Osteopenia    Severe sleep apnea    Stage 4 chronic kidney disease (Multi)    Arthritis of knee, left    Chronic kidney disease (CKD), stage III (moderate) (Multi)    MARIA R (acute kidney injury)

## 2025-07-30 ENCOUNTER — RESULTS FOLLOW-UP (OUTPATIENT)
Age: 80
End: 2025-07-30
Payer: MEDICARE

## 2025-07-30 LAB — BACTERIA UR CULT: NORMAL

## 2025-08-04 DIAGNOSIS — M25.562 CHRONIC PAIN OF LEFT KNEE: ICD-10-CM

## 2025-08-04 DIAGNOSIS — G89.29 CHRONIC PAIN OF LEFT KNEE: ICD-10-CM

## 2025-08-06 DIAGNOSIS — E11.22 TYPE 2 DIABETES MELLITUS WITH STAGE 3B CHRONIC KIDNEY DISEASE, WITHOUT LONG-TERM CURRENT USE OF INSULIN (MULTI): ICD-10-CM

## 2025-08-06 DIAGNOSIS — N18.32 TYPE 2 DIABETES MELLITUS WITH STAGE 3B CHRONIC KIDNEY DISEASE, WITHOUT LONG-TERM CURRENT USE OF INSULIN (MULTI): ICD-10-CM

## 2025-08-14 ENCOUNTER — APPOINTMENT (OUTPATIENT)
Dept: ORTHOPEDIC SURGERY | Facility: CLINIC | Age: 80
End: 2025-08-14
Payer: MEDICARE

## 2025-08-14 ENCOUNTER — HOSPITAL ENCOUNTER (OUTPATIENT)
Dept: RADIOLOGY | Facility: EXTERNAL LOCATION | Age: 80
Discharge: HOME | End: 2025-08-14

## 2025-08-14 ENCOUNTER — HOSPITAL ENCOUNTER (OUTPATIENT)
Dept: RADIOLOGY | Facility: CLINIC | Age: 80
Discharge: HOME | End: 2025-08-14
Payer: MEDICARE

## 2025-08-14 DIAGNOSIS — M25.562 CHRONIC PAIN OF LEFT KNEE: ICD-10-CM

## 2025-08-14 DIAGNOSIS — G89.29 CHRONIC PAIN OF LEFT KNEE: ICD-10-CM

## 2025-08-14 DIAGNOSIS — M17.12 ARTHRITIS OF KNEE, LEFT: ICD-10-CM

## 2025-08-14 PROCEDURE — 73564 X-RAY EXAM KNEE 4 OR MORE: CPT | Mod: LT

## 2025-08-14 PROCEDURE — 1036F TOBACCO NON-USER: CPT | Performed by: SPECIALIST

## 2025-08-14 PROCEDURE — 1160F RVW MEDS BY RX/DR IN RCRD: CPT | Performed by: SPECIALIST

## 2025-08-14 PROCEDURE — 1159F MED LIST DOCD IN RCRD: CPT | Performed by: SPECIALIST

## 2025-08-14 PROCEDURE — 99214 OFFICE O/P EST MOD 30 MIN: CPT | Performed by: SPECIALIST

## 2025-08-14 PROCEDURE — 20611 DRAIN/INJ JOINT/BURSA W/US: CPT | Performed by: SPECIALIST

## 2025-08-14 PROCEDURE — 1125F AMNT PAIN NOTED PAIN PRSNT: CPT | Performed by: SPECIALIST

## 2025-08-14 PROCEDURE — 73564 X-RAY EXAM KNEE 4 OR MORE: CPT | Mod: LEFT SIDE | Performed by: RADIOLOGY

## 2025-08-14 RX ORDER — TRIAMCINOLONE ACETONIDE 40 MG/ML
40 INJECTION, SUSPENSION INTRA-ARTICULAR; INTRAMUSCULAR
Status: COMPLETED | OUTPATIENT
Start: 2025-08-14 | End: 2025-08-14

## 2025-08-14 RX ADMIN — TRIAMCINOLONE ACETONIDE 40 MG: 40 INJECTION, SUSPENSION INTRA-ARTICULAR; INTRAMUSCULAR at 10:34

## 2025-08-14 ASSESSMENT — PAIN - FUNCTIONAL ASSESSMENT: PAIN_FUNCTIONAL_ASSESSMENT: 0-10

## 2025-08-14 ASSESSMENT — PAIN DESCRIPTION - DESCRIPTORS: DESCRIPTORS: ACHING

## 2025-08-14 ASSESSMENT — PAIN SCALES - GENERAL: PAINLEVEL_OUTOF10: 2

## 2025-08-27 ENCOUNTER — APPOINTMENT (OUTPATIENT)
Dept: CARDIOLOGY | Facility: HOSPITAL | Age: 80
DRG: 480 | End: 2025-08-27
Payer: MEDICARE

## 2025-08-27 ENCOUNTER — APPOINTMENT (OUTPATIENT)
Dept: RADIOLOGY | Facility: HOSPITAL | Age: 80
DRG: 480 | End: 2025-08-27
Payer: MEDICARE

## 2025-08-27 ENCOUNTER — HOSPITAL ENCOUNTER (INPATIENT)
Facility: HOSPITAL | Age: 80
DRG: 480 | End: 2025-08-27
Attending: EMERGENCY MEDICINE | Admitting: SPECIALIST
Payer: MEDICARE

## 2025-08-27 PROBLEM — S72.009A: Status: ACTIVE | Noted: 2025-08-27

## 2025-08-27 PROCEDURE — 73502 X-RAY EXAM HIP UNI 2-3 VIEWS: CPT | Mod: LT

## 2025-08-27 PROCEDURE — 72125 CT NECK SPINE W/O DYE: CPT

## 2025-08-27 PROCEDURE — 93005 ELECTROCARDIOGRAM TRACING: CPT

## 2025-08-27 PROCEDURE — 71045 X-RAY EXAM CHEST 1 VIEW: CPT

## 2025-08-27 PROCEDURE — 70450 CT HEAD/BRAIN W/O DYE: CPT

## 2025-08-27 ASSESSMENT — PAIN DESCRIPTION - DESCRIPTORS: DESCRIPTORS: ACHING;SHARP

## 2025-08-27 ASSESSMENT — PAIN DESCRIPTION - PROGRESSION: CLINICAL_PROGRESSION: GRADUALLY IMPROVING

## 2025-08-27 ASSESSMENT — PAIN DESCRIPTION - PAIN TYPE: TYPE: ACUTE PAIN

## 2025-08-27 ASSESSMENT — PAIN - FUNCTIONAL ASSESSMENT
PAIN_FUNCTIONAL_ASSESSMENT: 0-10
PAIN_FUNCTIONAL_ASSESSMENT: 0-10

## 2025-08-27 ASSESSMENT — PAIN SCALES - GENERAL
PAINLEVEL_OUTOF10: 7
PAINLEVEL_OUTOF10: 8
PAINLEVEL_OUTOF10: 7
PAINLEVEL_OUTOF10: 6

## 2025-08-27 ASSESSMENT — PAIN DESCRIPTION - LOCATION
LOCATION: HIP
LOCATION: HIP

## 2025-08-27 ASSESSMENT — PAIN DESCRIPTION - ORIENTATION
ORIENTATION: LEFT
ORIENTATION: LEFT

## 2025-08-27 ASSESSMENT — PAIN DESCRIPTION - FREQUENCY: FREQUENCY: CONSTANT/CONTINUOUS

## 2025-08-28 ENCOUNTER — PREP FOR PROCEDURE (OUTPATIENT)
Dept: ORTHOPEDIC SURGERY | Facility: CLINIC | Age: 80
End: 2025-08-28
Payer: MEDICARE

## 2025-08-28 ENCOUNTER — ANESTHESIA EVENT (OUTPATIENT)
Dept: OPERATING ROOM | Facility: HOSPITAL | Age: 80
End: 2025-08-28
Payer: MEDICARE

## 2025-08-28 DIAGNOSIS — S72.142A CLOSED INTERTROCHANTERIC FRACTURE OF LEFT HIP, INITIAL ENCOUNTER: Primary | ICD-10-CM

## 2025-08-28 RX ORDER — CEFAZOLIN SODIUM 2 G/100ML
2 INJECTION, SOLUTION INTRAVENOUS ONCE
Status: CANCELLED | OUTPATIENT
Start: 2025-08-28 | End: 2025-08-28

## 2025-08-28 SDOH — ECONOMIC STABILITY: INCOME INSECURITY: IN THE PAST 12 MONTHS HAS THE ELECTRIC, GAS, OIL, OR WATER COMPANY THREATENED TO SHUT OFF SERVICES IN YOUR HOME?: NO

## 2025-08-28 SDOH — SOCIAL STABILITY: SOCIAL INSECURITY: WITHIN THE LAST YEAR, HAVE YOU BEEN AFRAID OF YOUR PARTNER OR EX-PARTNER?: NO

## 2025-08-28 SDOH — ECONOMIC STABILITY: FOOD INSECURITY: WITHIN THE PAST 12 MONTHS, THE FOOD YOU BOUGHT JUST DIDN'T LAST AND YOU DIDN'T HAVE MONEY TO GET MORE.: NEVER TRUE

## 2025-08-28 SDOH — SOCIAL STABILITY: SOCIAL INSECURITY: DOES ANYONE TRY TO KEEP YOU FROM HAVING/CONTACTING OTHER FRIENDS OR DOING THINGS OUTSIDE YOUR HOME?: NO

## 2025-08-28 SDOH — SOCIAL STABILITY: SOCIAL INSECURITY: WITHIN THE LAST YEAR, HAVE YOU BEEN HUMILIATED OR EMOTIONALLY ABUSED IN OTHER WAYS BY YOUR PARTNER OR EX-PARTNER?: NO

## 2025-08-28 SDOH — SOCIAL STABILITY: SOCIAL INSECURITY: HAS ANYONE EVER THREATENED TO HURT YOUR FAMILY OR YOUR PETS?: NO

## 2025-08-28 SDOH — SOCIAL STABILITY: SOCIAL INSECURITY: ABUSE: ADULT

## 2025-08-28 SDOH — ECONOMIC STABILITY: FOOD INSECURITY: WITHIN THE PAST 12 MONTHS, YOU WORRIED THAT YOUR FOOD WOULD RUN OUT BEFORE YOU GOT THE MONEY TO BUY MORE.: NEVER TRUE

## 2025-08-28 SDOH — SOCIAL STABILITY: SOCIAL INSECURITY: HAVE YOU HAD THOUGHTS OF HARMING ANYONE ELSE?: NO

## 2025-08-28 SDOH — SOCIAL STABILITY: SOCIAL INSECURITY: DO YOU FEEL UNSAFE GOING BACK TO THE PLACE WHERE YOU ARE LIVING?: NO

## 2025-08-28 SDOH — SOCIAL STABILITY: SOCIAL INSECURITY: HAVE YOU HAD ANY THOUGHTS OF HARMING ANYONE ELSE?: NO

## 2025-08-28 SDOH — SOCIAL STABILITY: SOCIAL INSECURITY: WERE YOU ABLE TO COMPLETE ALL THE BEHAVIORAL HEALTH SCREENINGS?: YES

## 2025-08-28 SDOH — SOCIAL STABILITY: SOCIAL INSECURITY: ARE YOU OR HAVE YOU BEEN THREATENED OR ABUSED PHYSICALLY, EMOTIONALLY, OR SEXUALLY BY ANYONE?: NO

## 2025-08-28 SDOH — SOCIAL STABILITY: SOCIAL INSECURITY: ARE THERE ANY APPARENT SIGNS OF INJURIES/BEHAVIORS THAT COULD BE RELATED TO ABUSE/NEGLECT?: NO

## 2025-08-28 SDOH — SOCIAL STABILITY: SOCIAL INSECURITY: DO YOU FEEL ANYONE HAS EXPLOITED OR TAKEN ADVANTAGE OF YOU FINANCIALLY OR OF YOUR PERSONAL PROPERTY?: NO

## 2025-08-28 ASSESSMENT — ACTIVITIES OF DAILY LIVING (ADL)
GROOMING: NEEDS ASSISTANCE
DRESSING YOURSELF: NEEDS ASSISTANCE
TOILETING: NEEDS ASSISTANCE
FEEDING YOURSELF: INDEPENDENT
WALKS IN HOME: NEEDS ASSISTANCE
HEARING - RIGHT EAR: FUNCTIONAL
ADEQUATE_TO_COMPLETE_ADL: YES
BATHING: NEEDS ASSISTANCE
JUDGMENT_ADEQUATE_SAFELY_COMPLETE_DAILY_ACTIVITIES: YES
PATIENT'S MEMORY ADEQUATE TO SAFELY COMPLETE DAILY ACTIVITIES?: YES
HEARING - LEFT EAR: FUNCTIONAL
ASSISTIVE_DEVICE: EYEGLASSES;DENTURES UPPER
LACK_OF_TRANSPORTATION: NO

## 2025-08-28 ASSESSMENT — PAIN - FUNCTIONAL ASSESSMENT
PAIN_FUNCTIONAL_ASSESSMENT: 0-10

## 2025-08-28 ASSESSMENT — PAIN SCALES - GENERAL
PAINLEVEL_OUTOF10: 8
PAINLEVEL_OUTOF10: 7
PAINLEVEL_OUTOF10: 5 - MODERATE PAIN
PAINLEVEL_OUTOF10: 8
PAINLEVEL_OUTOF10: 7
PAINLEVEL_OUTOF10: 7
PAINLEVEL_OUTOF10: 6
PAINLEVEL_OUTOF10: 5 - MODERATE PAIN
PAINLEVEL_OUTOF10: 6

## 2025-08-28 ASSESSMENT — COGNITIVE AND FUNCTIONAL STATUS - GENERAL
MOVING TO AND FROM BED TO CHAIR: TOTAL
DAILY ACTIVITIY SCORE: 14
PERSONAL GROOMING: A LOT
TURNING FROM BACK TO SIDE WHILE IN FLAT BAD: A LITTLE
STANDING UP FROM CHAIR USING ARMS: TOTAL
CLIMB 3 TO 5 STEPS WITH RAILING: TOTAL
HELP NEEDED FOR BATHING: A LOT
PATIENT BASELINE BEDBOUND: NO
DRESSING REGULAR LOWER BODY CLOTHING: A LOT
WALKING IN HOSPITAL ROOM: TOTAL
DRESSING REGULAR UPPER BODY CLOTHING: A LOT
MOVING FROM LYING ON BACK TO SITTING ON SIDE OF FLAT BED WITH BEDRAILS: A LITTLE
MOBILITY SCORE: 10
TOILETING: A LOT

## 2025-08-28 ASSESSMENT — PAIN DESCRIPTION - PAIN TYPE: TYPE: ACUTE PAIN

## 2025-08-28 ASSESSMENT — LIFESTYLE VARIABLES
AUDIT-C TOTAL SCORE: 0
HOW MANY STANDARD DRINKS CONTAINING ALCOHOL DO YOU HAVE ON A TYPICAL DAY: PATIENT DOES NOT DRINK
AUDIT-C TOTAL SCORE: 0
HOW OFTEN DO YOU HAVE A DRINK CONTAINING ALCOHOL: NEVER
HOW OFTEN DO YOU HAVE 6 OR MORE DRINKS ON ONE OCCASION: NEVER
SKIP TO QUESTIONS 9-10: 1

## 2025-08-28 ASSESSMENT — PAIN DESCRIPTION - ORIENTATION
ORIENTATION: LEFT
ORIENTATION: LEFT
ORIENTATION: LOWER

## 2025-08-28 ASSESSMENT — PATIENT HEALTH QUESTIONNAIRE - PHQ9
2. FEELING DOWN, DEPRESSED OR HOPELESS: NEARLY EVERY DAY
SUM OF ALL RESPONSES TO PHQ9 QUESTIONS 1 & 2: 6
1. LITTLE INTEREST OR PLEASURE IN DOING THINGS: NEARLY EVERY DAY

## 2025-08-28 ASSESSMENT — PAIN DESCRIPTION - DESCRIPTORS
DESCRIPTORS: ACHING;SHOOTING;STABBING
DESCRIPTORS: SHARP

## 2025-08-28 ASSESSMENT — PAIN DESCRIPTION - PROGRESSION: CLINICAL_PROGRESSION: GRADUALLY IMPROVING

## 2025-08-28 ASSESSMENT — PAIN DESCRIPTION - LOCATION
LOCATION: HIP
LOCATION: HIP
LOCATION: BACK

## 2025-08-29 ENCOUNTER — SURGERY (OUTPATIENT)
Age: 80
End: 2025-08-29
Payer: MEDICARE

## 2025-08-29 ENCOUNTER — ANESTHESIA (OUTPATIENT)
Dept: OPERATING ROOM | Facility: HOSPITAL | Age: 80
End: 2025-08-29
Payer: MEDICARE

## 2025-08-29 ENCOUNTER — APPOINTMENT (OUTPATIENT)
Dept: RADIOLOGY | Facility: HOSPITAL | Age: 80
DRG: 480 | End: 2025-08-29
Payer: MEDICARE

## 2025-08-29 PROBLEM — Z90.710 HISTORY OF HYSTERECTOMY: Status: ACTIVE | Noted: 2025-08-29

## 2025-08-29 PROBLEM — M54.50 CHRONIC LOW BACK PAIN: Status: ACTIVE | Noted: 2025-08-29

## 2025-08-29 PROBLEM — G47.33 OSA (OBSTRUCTIVE SLEEP APNEA): Status: ACTIVE | Noted: 2025-08-29

## 2025-08-29 PROBLEM — G89.29 CHRONIC LOW BACK PAIN: Status: ACTIVE | Noted: 2025-08-29

## 2025-08-29 PROCEDURE — 2500000004 HC RX 250 GENERAL PHARMACY W/ HCPCS (ALT 636 FOR OP/ED): Performed by: ANESTHESIOLOGY

## 2025-08-29 RX ORDER — NEOSTIGMINE METHYLSULFATE 1 MG/ML
INJECTION, SOLUTION INTRAVENOUS AS NEEDED
Status: DISCONTINUED | OUTPATIENT
Start: 2025-08-29 | End: 2025-08-29

## 2025-08-29 RX ORDER — LIDOCAINE HYDROCHLORIDE 10 MG/ML
INJECTION, SOLUTION EPIDURAL; INFILTRATION; INTRACAUDAL; PERINEURAL AS NEEDED
Status: DISCONTINUED | OUTPATIENT
Start: 2025-08-29 | End: 2025-08-29

## 2025-08-29 RX ORDER — GLYCOPYRROLATE 0.2 MG/ML
INJECTION INTRAMUSCULAR; INTRAVENOUS AS NEEDED
Status: DISCONTINUED | OUTPATIENT
Start: 2025-08-29 | End: 2025-08-29

## 2025-08-29 RX ORDER — PHENYLEPHRINE HYDROCHLORIDE 10 MG/ML
INJECTION INTRAVENOUS AS NEEDED
Status: DISCONTINUED | OUTPATIENT
Start: 2025-08-29 | End: 2025-08-29

## 2025-08-29 RX ORDER — PROPOFOL 10 MG/ML
INJECTION, EMULSION INTRAVENOUS AS NEEDED
Status: DISCONTINUED | OUTPATIENT
Start: 2025-08-29 | End: 2025-08-29

## 2025-08-29 RX ORDER — FENTANYL CITRATE 50 UG/ML
INJECTION, SOLUTION INTRAMUSCULAR; INTRAVENOUS AS NEEDED
Status: DISCONTINUED | OUTPATIENT
Start: 2025-08-29 | End: 2025-08-29

## 2025-08-29 RX ORDER — ROCURONIUM BROMIDE 10 MG/ML
INJECTION, SOLUTION INTRAVENOUS AS NEEDED
Status: DISCONTINUED | OUTPATIENT
Start: 2025-08-29 | End: 2025-08-29

## 2025-08-29 RX ADMIN — GLYCOPYRROLATE 0.4 MG: 0.2 INJECTION, SOLUTION INTRAMUSCULAR; INTRAVENOUS at 14:07

## 2025-08-29 RX ADMIN — LIDOCAINE HYDROCHLORIDE 30 MG: 10 INJECTION, SOLUTION EPIDURAL; INFILTRATION; INTRACAUDAL; PERINEURAL at 12:37

## 2025-08-29 RX ADMIN — NEOSTIGMINE METHYLSULFATE 2.5 MG: 1 INJECTION, SOLUTION INTRAVENOUS at 14:07

## 2025-08-29 RX ADMIN — PROPOFOL 120 MG: 10 INJECTION, EMULSION INTRAVENOUS at 12:39

## 2025-08-29 RX ADMIN — FENTANYL CITRATE 100 MCG: 50 INJECTION, SOLUTION INTRAMUSCULAR; INTRAVENOUS at 12:37

## 2025-08-29 RX ADMIN — PHENYLEPHRINE HYDROCHLORIDE 50 MCG: 10 INJECTION INTRAVENOUS at 13:14

## 2025-08-29 RX ADMIN — PHENYLEPHRINE HYDROCHLORIDE 150 MCG: 10 INJECTION INTRAVENOUS at 12:45

## 2025-08-29 RX ADMIN — ROCURONIUM BROMIDE 40 MG: 10 INJECTION INTRAVENOUS at 12:39

## 2025-08-29 RX ADMIN — FENTANYL CITRATE 25 MCG: 50 INJECTION, SOLUTION INTRAMUSCULAR; INTRAVENOUS at 14:17

## 2025-08-29 RX ADMIN — GENTAMICIN SULFATE 80 MG: 40 INJECTION, SOLUTION INTRAMUSCULAR; INTRAVENOUS at 13:45

## 2025-08-29 SDOH — HEALTH STABILITY: MENTAL HEALTH: CURRENT SMOKER: 0

## 2025-08-29 ASSESSMENT — PAIN - FUNCTIONAL ASSESSMENT
PAIN_FUNCTIONAL_ASSESSMENT: 0-10
PAIN_FUNCTIONAL_ASSESSMENT: CPOT (CRITICAL CARE PAIN OBSERVATION TOOL)
PAIN_FUNCTIONAL_ASSESSMENT: 0-10

## 2025-08-29 ASSESSMENT — PAIN SCALES - GENERAL
PAINLEVEL_OUTOF10: 0 - NO PAIN
PAIN_LEVEL: 0
PAINLEVEL_OUTOF10: 0 - NO PAIN
PAINLEVEL_OUTOF10: 8
PAINLEVEL_OUTOF10: 0 - NO PAIN

## 2025-08-29 ASSESSMENT — PAIN DESCRIPTION - LOCATION: LOCATION: HIP

## 2025-08-29 ASSESSMENT — PAIN DESCRIPTION - ORIENTATION: ORIENTATION: LEFT

## 2025-08-30 ENCOUNTER — APPOINTMENT (OUTPATIENT)
Dept: CARDIOLOGY | Facility: HOSPITAL | Age: 80
DRG: 480 | End: 2025-08-30
Payer: MEDICARE

## 2025-08-30 PROCEDURE — 93005 ELECTROCARDIOGRAM TRACING: CPT

## 2025-08-30 ASSESSMENT — COGNITIVE AND FUNCTIONAL STATUS - GENERAL
TOILETING: A LOT
STANDING UP FROM CHAIR USING ARMS: TOTAL
MOVING FROM LYING ON BACK TO SITTING ON SIDE OF FLAT BED WITH BEDRAILS: A LITTLE
DRESSING REGULAR UPPER BODY CLOTHING: A LOT
HELP NEEDED FOR BATHING: A LOT
EATING MEALS: A LITTLE
DRESSING REGULAR LOWER BODY CLOTHING: TOTAL
WALKING IN HOSPITAL ROOM: TOTAL
CLIMB 3 TO 5 STEPS WITH RAILING: TOTAL
MOBILITY SCORE: 10
MOVING TO AND FROM BED TO CHAIR: TOTAL
PERSONAL GROOMING: A LITTLE
DAILY ACTIVITIY SCORE: 13
TURNING FROM BACK TO SIDE WHILE IN FLAT BAD: A LITTLE

## 2025-08-30 ASSESSMENT — PAIN SCALES - WONG BAKER
WONGBAKER_NUMERICALRESPONSE: HURTS LITTLE MORE
WONGBAKER_NUMERICALRESPONSE: HURTS WHOLE LOT
WONGBAKER_NUMERICALRESPONSE: NO HURT
WONGBAKER_NUMERICALRESPONSE: HURTS WHOLE LOT
WONGBAKER_NUMERICALRESPONSE: NO HURT
WONGBAKER_NUMERICALRESPONSE: HURTS LITTLE MORE

## 2025-08-30 ASSESSMENT — PAIN DESCRIPTION - LOCATION
LOCATION: HIP

## 2025-08-30 ASSESSMENT — PAIN DESCRIPTION - ORIENTATION
ORIENTATION: LEFT

## 2025-08-30 ASSESSMENT — PAIN - FUNCTIONAL ASSESSMENT
PAIN_FUNCTIONAL_ASSESSMENT: WONG-BAKER FACES
PAIN_FUNCTIONAL_ASSESSMENT: WONG-BAKER FACES
PAIN_FUNCTIONAL_ASSESSMENT: 0-10
PAIN_FUNCTIONAL_ASSESSMENT: 0-10
PAIN_FUNCTIONAL_ASSESSMENT: WONG-BAKER FACES
PAIN_FUNCTIONAL_ASSESSMENT: 0-10

## 2025-08-30 ASSESSMENT — PAIN DESCRIPTION - DESCRIPTORS
DESCRIPTORS: ACHING
DESCRIPTORS: ACHING;SORE
DESCRIPTORS: DISCOMFORT;SORE
DESCRIPTORS: ACHING

## 2025-08-30 ASSESSMENT — PAIN SCALES - GENERAL
PAINLEVEL_OUTOF10: 0 - NO PAIN
PAINLEVEL_OUTOF10: 7
PAINLEVEL_OUTOF10: 0 - NO PAIN

## 2025-08-31 ASSESSMENT — COGNITIVE AND FUNCTIONAL STATUS - GENERAL
MOVING TO AND FROM BED TO CHAIR: TOTAL
MOVING TO AND FROM BED TO CHAIR: TOTAL
DRESSING REGULAR LOWER BODY CLOTHING: TOTAL
MOBILITY SCORE: 10
STANDING UP FROM CHAIR USING ARMS: TOTAL
DAILY ACTIVITIY SCORE: 13
TURNING FROM BACK TO SIDE WHILE IN FLAT BAD: A LITTLE
PERSONAL GROOMING: A LITTLE
TOILETING: A LOT
MOVING FROM LYING ON BACK TO SITTING ON SIDE OF FLAT BED WITH BEDRAILS: A LOT
DAILY ACTIVITIY SCORE: 13
CLIMB 3 TO 5 STEPS WITH RAILING: TOTAL
MOVING FROM LYING ON BACK TO SITTING ON SIDE OF FLAT BED WITH BEDRAILS: A LITTLE
WALKING IN HOSPITAL ROOM: TOTAL
MOBILITY SCORE: 10
CLIMB 3 TO 5 STEPS WITH RAILING: TOTAL
TURNING FROM BACK TO SIDE WHILE IN FLAT BAD: A LITTLE
MOVING FROM LYING ON BACK TO SITTING ON SIDE OF FLAT BED WITH BEDRAILS: A LITTLE
DRESSING REGULAR LOWER BODY CLOTHING: TOTAL
STANDING UP FROM CHAIR USING ARMS: TOTAL
WALKING IN HOSPITAL ROOM: TOTAL
HELP NEEDED FOR BATHING: A LOT
PERSONAL GROOMING: A LITTLE
DRESSING REGULAR UPPER BODY CLOTHING: A LOT
TOILETING: A LOT
CLIMB 3 TO 5 STEPS WITH RAILING: TOTAL
HELP NEEDED FOR BATHING: A LOT
DRESSING REGULAR UPPER BODY CLOTHING: A LOT
TURNING FROM BACK TO SIDE WHILE IN FLAT BAD: A LOT
WALKING IN HOSPITAL ROOM: TOTAL
MOBILITY SCORE: 8
MOVING TO AND FROM BED TO CHAIR: TOTAL
EATING MEALS: A LITTLE
EATING MEALS: A LITTLE
STANDING UP FROM CHAIR USING ARMS: TOTAL

## 2025-08-31 ASSESSMENT — PAIN SCALES - GENERAL
PAINLEVEL_OUTOF10: 0 - NO PAIN

## 2025-08-31 ASSESSMENT — PAIN - FUNCTIONAL ASSESSMENT
PAIN_FUNCTIONAL_ASSESSMENT: 0-10

## 2025-08-31 ASSESSMENT — ACTIVITIES OF DAILY LIVING (ADL): ADL_ASSISTANCE: INDEPENDENT

## 2025-09-01 ASSESSMENT — COGNITIVE AND FUNCTIONAL STATUS - GENERAL
WALKING IN HOSPITAL ROOM: TOTAL
DRESSING REGULAR UPPER BODY CLOTHING: A LOT
STANDING UP FROM CHAIR USING ARMS: TOTAL
DAILY ACTIVITIY SCORE: 13
TURNING FROM BACK TO SIDE WHILE IN FLAT BAD: A LOT
MOBILITY SCORE: 9
WALKING IN HOSPITAL ROOM: TOTAL
MOBILITY SCORE: 9
STANDING UP FROM CHAIR USING ARMS: TOTAL
MOVING TO AND FROM BED TO CHAIR: TOTAL
TURNING FROM BACK TO SIDE WHILE IN FLAT BAD: A LOT
DAILY ACTIVITIY SCORE: 13
MOVING TO AND FROM BED TO CHAIR: TOTAL
MOVING TO AND FROM BED TO CHAIR: TOTAL
TURNING FROM BACK TO SIDE WHILE IN FLAT BAD: A LOT
MOBILITY SCORE: 8
STANDING UP FROM CHAIR USING ARMS: TOTAL
DRESSING REGULAR UPPER BODY CLOTHING: A LOT
PERSONAL GROOMING: A LITTLE
MOVING FROM LYING ON BACK TO SITTING ON SIDE OF FLAT BED WITH BEDRAILS: A LITTLE
MOVING FROM LYING ON BACK TO SITTING ON SIDE OF FLAT BED WITH BEDRAILS: A LITTLE
HELP NEEDED FOR BATHING: A LOT
HELP NEEDED FOR BATHING: A LOT
TOILETING: A LOT
WALKING IN HOSPITAL ROOM: TOTAL
CLIMB 3 TO 5 STEPS WITH RAILING: TOTAL
TOILETING: A LOT
PERSONAL GROOMING: A LITTLE
DRESSING REGULAR LOWER BODY CLOTHING: TOTAL
DRESSING REGULAR LOWER BODY CLOTHING: TOTAL
CLIMB 3 TO 5 STEPS WITH RAILING: TOTAL
EATING MEALS: A LITTLE
CLIMB 3 TO 5 STEPS WITH RAILING: TOTAL
EATING MEALS: A LITTLE
MOVING FROM LYING ON BACK TO SITTING ON SIDE OF FLAT BED WITH BEDRAILS: A LOT

## 2025-09-01 ASSESSMENT — PAIN - FUNCTIONAL ASSESSMENT
PAIN_FUNCTIONAL_ASSESSMENT: 0-10

## 2025-09-01 ASSESSMENT — PAIN SCALES - GENERAL
PAINLEVEL_OUTOF10: 10 - WORST POSSIBLE PAIN
PAINLEVEL_OUTOF10: 0 - NO PAIN
PAINLEVEL_OUTOF10: 0 - NO PAIN

## 2025-09-02 ENCOUNTER — TELEPHONE (OUTPATIENT)
Age: 80
End: 2025-09-02

## 2025-09-02 ENCOUNTER — APPOINTMENT (OUTPATIENT)
Dept: CARDIOLOGY | Facility: HOSPITAL | Age: 80
DRG: 480 | End: 2025-09-02
Payer: MEDICARE

## 2025-09-02 PROBLEM — Z90.710 HISTORY OF HYSTERECTOMY: Status: RESOLVED | Noted: 2025-08-29 | Resolved: 2025-09-02

## 2025-09-02 PROBLEM — G89.29 CHRONIC LOW BACK PAIN: Status: RESOLVED | Noted: 2025-08-29 | Resolved: 2025-09-02

## 2025-09-02 PROBLEM — S72.142A CLOSED INTERTROCHANTERIC FRACTURE OF LEFT HIP, INITIAL ENCOUNTER: Status: RESOLVED | Noted: 2025-08-28 | Resolved: 2025-09-02

## 2025-09-02 PROBLEM — S72.009A: Status: RESOLVED | Noted: 2025-08-27 | Resolved: 2025-09-02

## 2025-09-02 PROBLEM — M54.50 CHRONIC LOW BACK PAIN: Status: RESOLVED | Noted: 2025-08-29 | Resolved: 2025-09-02

## 2025-09-02 PROCEDURE — 93306 TTE W/DOPPLER COMPLETE: CPT | Performed by: INTERNAL MEDICINE

## 2025-09-02 PROCEDURE — 93306 TTE W/DOPPLER COMPLETE: CPT

## 2025-09-02 ASSESSMENT — PAIN - FUNCTIONAL ASSESSMENT
PAIN_FUNCTIONAL_ASSESSMENT: 0-10

## 2025-09-02 ASSESSMENT — COGNITIVE AND FUNCTIONAL STATUS - GENERAL
TURNING FROM BACK TO SIDE WHILE IN FLAT BAD: TOTAL
STANDING UP FROM CHAIR USING ARMS: TOTAL
STANDING UP FROM CHAIR USING ARMS: TOTAL
TOILETING: TOTAL
TURNING FROM BACK TO SIDE WHILE IN FLAT BAD: TOTAL
MOVING FROM LYING ON BACK TO SITTING ON SIDE OF FLAT BED WITH BEDRAILS: A LOT
PERSONAL GROOMING: A LITTLE
EATING MEALS: A LITTLE
MOVING TO AND FROM BED TO CHAIR: TOTAL
HELP NEEDED FOR BATHING: TOTAL
WALKING IN HOSPITAL ROOM: TOTAL
MOBILITY SCORE: 7
MOVING TO AND FROM BED TO CHAIR: TOTAL
DAILY ACTIVITIY SCORE: 12
MOBILITY SCORE: 7
DRESSING REGULAR UPPER BODY CLOTHING: A LITTLE
CLIMB 3 TO 5 STEPS WITH RAILING: TOTAL
MOVING FROM LYING ON BACK TO SITTING ON SIDE OF FLAT BED WITH BEDRAILS: A LOT
CLIMB 3 TO 5 STEPS WITH RAILING: TOTAL
WALKING IN HOSPITAL ROOM: TOTAL
DRESSING REGULAR LOWER BODY CLOTHING: TOTAL

## 2025-09-02 ASSESSMENT — PAIN DESCRIPTION - DESCRIPTORS: DESCRIPTORS: ACHING;DISCOMFORT;CRAMPING

## 2025-09-02 ASSESSMENT — PAIN SCALES - GENERAL
PAINLEVEL_OUTOF10: 7
PAINLEVEL_OUTOF10: 7
PAINLEVEL_OUTOF10: 6
PAINLEVEL_OUTOF10: 7
PAINLEVEL_OUTOF10: 6
PAINLEVEL_OUTOF10: 0 - NO PAIN

## 2025-09-02 ASSESSMENT — PAIN DESCRIPTION - LOCATION
LOCATION: HIP
LOCATION: LEG

## 2025-09-02 ASSESSMENT — ACTIVITIES OF DAILY LIVING (ADL)
ADL_ASSISTANCE: INDEPENDENT
BATHING_ASSISTANCE: TOTAL
BATHING_ASSISTANCE: TOTAL
ADL_ASSISTANCE: INDEPENDENT

## 2025-09-02 ASSESSMENT — PAIN DESCRIPTION - ORIENTATION
ORIENTATION: LEFT
ORIENTATION: RIGHT;LEFT

## 2025-09-03 DIAGNOSIS — Z87.81 S/P LEFT HIP FRACTURE: ICD-10-CM

## 2025-09-04 ENCOUNTER — HOSPITAL ENCOUNTER (INPATIENT)
Facility: HOSPITAL | Age: 80
End: 2025-09-04
Attending: EMERGENCY MEDICINE | Admitting: INTERNAL MEDICINE
Payer: MEDICARE

## 2025-09-04 ENCOUNTER — NURSING HOME VISIT (OUTPATIENT)
Dept: POST ACUTE CARE | Facility: EXTERNAL LOCATION | Age: 80
End: 2025-09-04
Payer: MEDICARE

## 2025-09-04 ENCOUNTER — TELEPHONE (OUTPATIENT)
Dept: NEPHROLOGY | Facility: CLINIC | Age: 80
End: 2025-09-04
Payer: MEDICARE

## 2025-09-04 DIAGNOSIS — K56.7 ILEUS (MULTI): ICD-10-CM

## 2025-09-04 DIAGNOSIS — K56.7 ILEUS (MULTI): Primary | ICD-10-CM

## 2025-09-04 DIAGNOSIS — N18.32 STAGE 3B CHRONIC KIDNEY DISEASE (MULTI): ICD-10-CM

## 2025-09-04 DIAGNOSIS — K52.9 COLITIS: Primary | ICD-10-CM

## 2025-09-04 LAB
ALBUMIN SERPL BCP-MCNC: 3.2 G/DL (ref 3.4–5)
ALP SERPL-CCNC: 216 U/L (ref 33–136)
ALT SERPL W P-5'-P-CCNC: 51 U/L (ref 7–45)
ANION GAP SERPL CALC-SCNC: 16 MMOL/L (ref 10–20)
AST SERPL W P-5'-P-CCNC: 59 U/L (ref 9–39)
BILIRUB SERPL-MCNC: 0.5 MG/DL (ref 0–1.2)
BUN SERPL-MCNC: 63 MG/DL (ref 6–23)
CALCIUM SERPL-MCNC: 8.7 MG/DL (ref 8.6–10.3)
CARDIAC TROPONIN I PNL SERPL HS: 30 NG/L (ref 0–13)
CHLORIDE SERPL-SCNC: 99 MMOL/L (ref 98–107)
CO2 SERPL-SCNC: 21 MMOL/L (ref 21–32)
CREAT SERPL-MCNC: 1.83 MG/DL (ref 0.5–1.05)
EGFRCR SERPLBLD CKD-EPI 2021: 28 ML/MIN/1.73M*2
GLUCOSE SERPL-MCNC: 117 MG/DL (ref 74–99)
LIPASE SERPL-CCNC: 28 U/L (ref 9–82)
POTASSIUM SERPL-SCNC: 3.6 MMOL/L (ref 3.5–5.3)
PROT SERPL-MCNC: 5.9 G/DL (ref 6.4–8.2)
SODIUM SERPL-SCNC: 132 MMOL/L (ref 136–145)

## 2025-09-04 PROCEDURE — 2500000001 HC RX 250 WO HCPCS SELF ADMINISTERED DRUGS (ALT 637 FOR MEDICARE OP): Performed by: EMERGENCY MEDICINE

## 2025-09-04 PROCEDURE — 99285 EMERGENCY DEPT VISIT HI MDM: CPT | Mod: 25 | Performed by: EMERGENCY MEDICINE

## 2025-09-04 PROCEDURE — 85027 COMPLETE CBC AUTOMATED: CPT | Performed by: EMERGENCY MEDICINE

## 2025-09-04 PROCEDURE — 83690 ASSAY OF LIPASE: CPT | Performed by: EMERGENCY MEDICINE

## 2025-09-04 PROCEDURE — 2500000005 HC RX 250 GENERAL PHARMACY W/O HCPCS: Performed by: EMERGENCY MEDICINE

## 2025-09-04 PROCEDURE — 84484 ASSAY OF TROPONIN QUANT: CPT | Performed by: EMERGENCY MEDICINE

## 2025-09-04 PROCEDURE — 99309 SBSQ NF CARE MODERATE MDM 30: CPT | Performed by: NURSE PRACTITIONER

## 2025-09-04 PROCEDURE — 36415 COLL VENOUS BLD VENIPUNCTURE: CPT | Performed by: EMERGENCY MEDICINE

## 2025-09-04 PROCEDURE — 85007 BL SMEAR W/DIFF WBC COUNT: CPT | Performed by: EMERGENCY MEDICINE

## 2025-09-04 PROCEDURE — 80053 COMPREHEN METABOLIC PANEL: CPT | Performed by: EMERGENCY MEDICINE

## 2025-09-04 RX ORDER — ALUMINUM HYDROXIDE, MAGNESIUM HYDROXIDE, AND SIMETHICONE 1200; 120; 1200 MG/30ML; MG/30ML; MG/30ML
30 SUSPENSION ORAL ONCE
Status: COMPLETED | OUTPATIENT
Start: 2025-09-04 | End: 2025-09-04

## 2025-09-04 RX ORDER — LIDOCAINE HYDROCHLORIDE 20 MG/ML
15 SOLUTION OROPHARYNGEAL ONCE
Status: COMPLETED | OUTPATIENT
Start: 2025-09-04 | End: 2025-09-04

## 2025-09-04 RX ADMIN — ALUMINUM HYDROXIDE, MAGNESIUM HYDROXIDE, AND SIMETHICONE 30 ML: 200; 200; 20 SUSPENSION ORAL at 22:48

## 2025-09-04 RX ADMIN — LIDOCAINE HYDROCHLORIDE 15 ML: 20 SOLUTION ORAL at 22:48

## 2025-09-04 ASSESSMENT — PAIN DESCRIPTION - PAIN TYPE: TYPE: ACUTE PAIN

## 2025-09-04 ASSESSMENT — PAIN SCALES - GENERAL: PAINLEVEL_OUTOF10: 8

## 2025-09-04 ASSESSMENT — PAIN DESCRIPTION - LOCATION: LOCATION: ABDOMEN

## 2025-09-04 ASSESSMENT — PAIN - FUNCTIONAL ASSESSMENT: PAIN_FUNCTIONAL_ASSESSMENT: 0-10

## 2025-09-05 ENCOUNTER — APPOINTMENT (OUTPATIENT)
Age: 80
End: 2025-09-05
Payer: MEDICARE

## 2025-09-05 PROBLEM — K56.7 ILEUS (MULTI): Status: ACTIVE | Noted: 2025-09-05

## 2025-09-05 PROBLEM — K52.9 COLITIS: Status: ACTIVE | Noted: 2025-09-05

## 2025-09-05 LAB
ALBUMIN SERPL BCP-MCNC: 3.1 G/DL (ref 3.4–5)
ALP SERPL-CCNC: 199 U/L (ref 33–136)
ALT SERPL W P-5'-P-CCNC: 51 U/L (ref 7–45)
ANION GAP SERPL CALC-SCNC: 13 MMOL/L (ref 10–20)
AST SERPL W P-5'-P-CCNC: 56 U/L (ref 9–39)
BASOPHILS # BLD MANUAL: 0 X10*3/UL (ref 0–0.1)
BASOPHILS NFR BLD MANUAL: 0 %
BILIRUB SERPL-MCNC: 0.4 MG/DL (ref 0–1.2)
BUN SERPL-MCNC: 60 MG/DL (ref 6–23)
C DIF TOX TCDA+TCDB STL QL NAA+PROBE: NOT DETECTED
CALCIUM SERPL-MCNC: 8.4 MG/DL (ref 8.6–10.3)
CHLORIDE SERPL-SCNC: 100 MMOL/L (ref 98–107)
CO2 SERPL-SCNC: 23 MMOL/L (ref 21–32)
CREAT SERPL-MCNC: 1.77 MG/DL (ref 0.5–1.05)
EGFRCR SERPLBLD CKD-EPI 2021: 29 ML/MIN/1.73M*2
EOSINOPHIL # BLD MANUAL: 0.45 X10*3/UL (ref 0–0.4)
EOSINOPHIL NFR BLD MANUAL: 4 %
ERYTHROCYTE [DISTWIDTH] IN BLOOD BY AUTOMATED COUNT: 13.7 % (ref 11.5–14.5)
GIANT PLATELETS BLD QL SMEAR: ABNORMAL
GLUCOSE SERPL-MCNC: 114 MG/DL (ref 74–99)
HCT VFR BLD AUTO: 32.5 % (ref 36–46)
HGB BLD-MCNC: 10.4 G/DL (ref 12–16)
HOLD SPECIMEN: NORMAL
IMM GRANULOCYTES # BLD AUTO: 0.61 X10*3/UL (ref 0–0.5)
IMM GRANULOCYTES NFR BLD AUTO: 5.4 % (ref 0–0.9)
LYMPHOCYTES # BLD MANUAL: 3.05 X10*3/UL (ref 0.8–3)
LYMPHOCYTES NFR BLD MANUAL: 27 %
MAGNESIUM SERPL-MCNC: 2.11 MG/DL (ref 1.6–2.4)
MCH RBC QN AUTO: 28.3 PG (ref 26–34)
MCHC RBC AUTO-ENTMCNC: 32 G/DL (ref 32–36)
MCV RBC AUTO: 88 FL (ref 80–100)
MONOCYTES # BLD MANUAL: 0.79 X10*3/UL (ref 0.05–0.8)
MONOCYTES NFR BLD MANUAL: 7 %
NEUTROPHILS # BLD MANUAL: 7.01 X10*3/UL (ref 1.6–5.5)
NEUTS BAND # BLD MANUAL: 0.57 X10*3/UL (ref 0–0.5)
NEUTS BAND NFR BLD MANUAL: 5 %
NEUTS SEG # BLD MANUAL: 6.44 X10*3/UL (ref 1.6–5)
NEUTS SEG NFR BLD MANUAL: 57 %
NRBC BLD-RTO: 0.3 /100 WBCS (ref 0–0)
PHOSPHATE SERPL-MCNC: 4 MG/DL (ref 2.5–4.9)
PLATELET # BLD AUTO: 454 X10*3/UL (ref 150–450)
POTASSIUM SERPL-SCNC: 3.6 MMOL/L (ref 3.5–5.3)
PROT SERPL-MCNC: 5.6 G/DL (ref 6.4–8.2)
RBC # BLD AUTO: 3.68 X10*6/UL (ref 4–5.2)
RBC MORPH BLD: ABNORMAL
SODIUM SERPL-SCNC: 132 MMOL/L (ref 136–145)
TOTAL CELLS COUNTED BLD: 100
WBC # BLD AUTO: 11.3 X10*3/UL (ref 4.4–11.3)

## 2025-09-05 PROCEDURE — 2500000001 HC RX 250 WO HCPCS SELF ADMINISTERED DRUGS (ALT 637 FOR MEDICARE OP): Performed by: INTERNAL MEDICINE

## 2025-09-05 PROCEDURE — 2500000004 HC RX 250 GENERAL PHARMACY W/ HCPCS (ALT 636 FOR OP/ED): Performed by: INTERNAL MEDICINE

## 2025-09-05 PROCEDURE — 87506 IADNA-DNA/RNA PROBE TQ 6-11: CPT | Mod: SAMLAB | Performed by: INTERNAL MEDICINE

## 2025-09-05 PROCEDURE — 83735 ASSAY OF MAGNESIUM: CPT | Performed by: SURGERY

## 2025-09-05 PROCEDURE — 99223 1ST HOSP IP/OBS HIGH 75: CPT | Performed by: INTERNAL MEDICINE

## 2025-09-05 PROCEDURE — 87493 C DIFF AMPLIFIED PROBE: CPT | Performed by: INTERNAL MEDICINE

## 2025-09-05 PROCEDURE — 99222 1ST HOSP IP/OBS MODERATE 55: CPT | Performed by: SURGERY

## 2025-09-05 PROCEDURE — 84100 ASSAY OF PHOSPHORUS: CPT | Performed by: SURGERY

## 2025-09-05 PROCEDURE — 2500000004 HC RX 250 GENERAL PHARMACY W/ HCPCS (ALT 636 FOR OP/ED): Performed by: EMERGENCY MEDICINE

## 2025-09-05 PROCEDURE — 80053 COMPREHEN METABOLIC PANEL: CPT | Performed by: INTERNAL MEDICINE

## 2025-09-05 PROCEDURE — 2500000002 HC RX 250 W HCPCS SELF ADMINISTERED DRUGS (ALT 637 FOR MEDICARE OP, ALT 636 FOR OP/ED): Performed by: INTERNAL MEDICINE

## 2025-09-05 PROCEDURE — 97165 OT EVAL LOW COMPLEX 30 MIN: CPT | Mod: GO

## 2025-09-05 PROCEDURE — 97161 PT EVAL LOW COMPLEX 20 MIN: CPT | Mod: GP | Performed by: PHYSICAL THERAPIST

## 2025-09-05 PROCEDURE — 36415 COLL VENOUS BLD VENIPUNCTURE: CPT | Performed by: INTERNAL MEDICINE

## 2025-09-05 PROCEDURE — 1100000001 HC PRIVATE ROOM DAILY

## 2025-09-05 RX ORDER — METRONIDAZOLE 500 MG/100ML
500 INJECTION, SOLUTION INTRAVENOUS EVERY 8 HOURS
Status: DISPENSED | OUTPATIENT
Start: 2025-09-05

## 2025-09-05 RX ORDER — METOPROLOL TARTRATE 25 MG/1
25 TABLET, FILM COATED ORAL 2 TIMES DAILY
Status: DISPENSED | OUTPATIENT
Start: 2025-09-05

## 2025-09-05 RX ORDER — METRONIDAZOLE 500 MG/100ML
500 INJECTION, SOLUTION INTRAVENOUS ONCE
Status: COMPLETED | OUTPATIENT
Start: 2025-09-05 | End: 2025-09-05

## 2025-09-05 RX ORDER — HEPARIN SODIUM 5000 [USP'U]/ML
5000 INJECTION, SOLUTION INTRAVENOUS; SUBCUTANEOUS EVERY 8 HOURS
Status: DISPENSED | OUTPATIENT
Start: 2025-09-05

## 2025-09-05 RX ORDER — CIPROFLOXACIN 2 MG/ML
400 INJECTION, SOLUTION INTRAVENOUS EVERY 12 HOURS
Status: DISPENSED | OUTPATIENT
Start: 2025-09-05

## 2025-09-05 RX ORDER — SERTRALINE HYDROCHLORIDE 50 MG/1
50 TABLET, FILM COATED ORAL DAILY
Status: DISPENSED | OUTPATIENT
Start: 2025-09-05

## 2025-09-05 RX ORDER — ONDANSETRON HYDROCHLORIDE 2 MG/ML
4 INJECTION, SOLUTION INTRAVENOUS EVERY 8 HOURS PRN
Status: DISPENSED | OUTPATIENT
Start: 2025-09-05

## 2025-09-05 RX ORDER — ACETAMINOPHEN 160 MG/5ML
650 SOLUTION ORAL EVERY 4 HOURS PRN
Status: ACTIVE | OUTPATIENT
Start: 2025-09-05

## 2025-09-05 RX ORDER — ASPIRIN 81 MG/1
81 TABLET ORAL 2 TIMES DAILY
Status: DISPENSED | OUTPATIENT
Start: 2025-09-05

## 2025-09-05 RX ORDER — ATORVASTATIN CALCIUM 20 MG/1
20 TABLET, FILM COATED ORAL DAILY
Status: DISCONTINUED | OUTPATIENT
Start: 2025-09-05 | End: 2025-09-05 | Stop reason: SDUPTHER

## 2025-09-05 RX ORDER — SODIUM CHLORIDE 9 MG/ML
75 INJECTION, SOLUTION INTRAVENOUS CONTINUOUS
Status: ACTIVE | OUTPATIENT
Start: 2025-09-05 | End: 2025-09-06

## 2025-09-05 RX ORDER — ROSUVASTATIN CALCIUM 5 MG/1
10 TABLET, COATED ORAL DAILY
Status: DISPENSED | OUTPATIENT
Start: 2025-09-05

## 2025-09-05 RX ORDER — PANTOPRAZOLE SODIUM 40 MG/1
40 TABLET, DELAYED RELEASE ORAL
Status: DISPENSED | OUTPATIENT
Start: 2025-09-05

## 2025-09-05 RX ORDER — ONDANSETRON 4 MG/1
4 TABLET, ORALLY DISINTEGRATING ORAL EVERY 8 HOURS PRN
Status: ACTIVE | OUTPATIENT
Start: 2025-09-05

## 2025-09-05 RX ORDER — ACETAMINOPHEN 650 MG/1
650 SUPPOSITORY RECTAL EVERY 4 HOURS PRN
Status: ACTIVE | OUTPATIENT
Start: 2025-09-05

## 2025-09-05 RX ORDER — ACETAMINOPHEN 325 MG/1
650 TABLET ORAL EVERY 4 HOURS PRN
Status: DISPENSED | OUTPATIENT
Start: 2025-09-05

## 2025-09-05 RX ORDER — ATORVASTATIN CALCIUM 20 MG/1
20 TABLET, FILM COATED ORAL DAILY
Status: ON HOLD | COMMUNITY

## 2025-09-05 RX ORDER — CIPROFLOXACIN 2 MG/ML
400 INJECTION, SOLUTION INTRAVENOUS ONCE
Status: COMPLETED | OUTPATIENT
Start: 2025-09-05 | End: 2025-09-05

## 2025-09-05 RX ADMIN — ONDANSETRON 4 MG: 2 INJECTION INTRAMUSCULAR; INTRAVENOUS at 23:02

## 2025-09-05 RX ADMIN — PANTOPRAZOLE SODIUM 40 MG: 40 TABLET, DELAYED RELEASE ORAL at 09:54

## 2025-09-05 RX ADMIN — ASPIRIN 81 MG: 81 TABLET, DELAYED RELEASE ORAL at 09:54

## 2025-09-05 RX ADMIN — Medication 1 CAPSULE: at 09:54

## 2025-09-05 RX ADMIN — HEPARIN SODIUM 5000 UNITS: 5000 INJECTION, SOLUTION INTRAVENOUS; SUBCUTANEOUS at 05:28

## 2025-09-05 RX ADMIN — METRONIDAZOLE 500 MG: 500 INJECTION, SOLUTION INTRAVENOUS at 17:34

## 2025-09-05 RX ADMIN — SODIUM CHLORIDE 75 ML/HR: 0.9 INJECTION, SOLUTION INTRAVENOUS at 05:28

## 2025-09-05 RX ADMIN — METOPROLOL TARTRATE 25 MG: 25 TABLET, FILM COATED ORAL at 20:27

## 2025-09-05 RX ADMIN — HEPARIN SODIUM 5000 UNITS: 5000 INJECTION, SOLUTION INTRAVENOUS; SUBCUTANEOUS at 15:31

## 2025-09-05 RX ADMIN — METOPROLOL TARTRATE 25 MG: 25 TABLET, FILM COATED ORAL at 09:56

## 2025-09-05 RX ADMIN — METRONIDAZOLE 500 MG: 500 INJECTION, SOLUTION INTRAVENOUS at 23:58

## 2025-09-05 RX ADMIN — HEPARIN SODIUM 5000 UNITS: 5000 INJECTION, SOLUTION INTRAVENOUS; SUBCUTANEOUS at 20:26

## 2025-09-05 RX ADMIN — CIPROFLOXACIN 400 MG: 2 INJECTION, SOLUTION INTRAVENOUS at 02:04

## 2025-09-05 RX ADMIN — METRONIDAZOLE 500 MG: 500 INJECTION, SOLUTION INTRAVENOUS at 10:01

## 2025-09-05 RX ADMIN — ASPIRIN 81 MG: 81 TABLET, DELAYED RELEASE ORAL at 20:27

## 2025-09-05 RX ADMIN — METRONIDAZOLE 500 MG: 500 INJECTION, SOLUTION INTRAVENOUS at 00:36

## 2025-09-05 RX ADMIN — CIPROFLOXACIN 400 MG: 2 INJECTION, SOLUTION INTRAVENOUS at 15:32

## 2025-09-05 RX ADMIN — SERTRALINE HYDROCHLORIDE 50 MG: 50 TABLET ORAL at 09:55

## 2025-09-05 RX ADMIN — ROSUVASTATIN CALCIUM 10 MG: 5 TABLET, FILM COATED ORAL at 09:54

## 2025-09-05 SDOH — ECONOMIC STABILITY: HOUSING INSECURITY: AT ANY TIME IN THE PAST 12 MONTHS, WERE YOU HOMELESS OR LIVING IN A SHELTER (INCLUDING NOW)?: NO

## 2025-09-05 SDOH — ECONOMIC STABILITY: FOOD INSECURITY: HOW HARD IS IT FOR YOU TO PAY FOR THE VERY BASICS LIKE FOOD, HOUSING, MEDICAL CARE, AND HEATING?: NOT VERY HARD

## 2025-09-05 SDOH — ECONOMIC STABILITY: FOOD INSECURITY: WITHIN THE PAST 12 MONTHS, YOU WORRIED THAT YOUR FOOD WOULD RUN OUT BEFORE YOU GOT THE MONEY TO BUY MORE.: NEVER TRUE

## 2025-09-05 SDOH — ECONOMIC STABILITY: INCOME INSECURITY: IN THE PAST 12 MONTHS HAS THE ELECTRIC, GAS, OIL, OR WATER COMPANY THREATENED TO SHUT OFF SERVICES IN YOUR HOME?: NO

## 2025-09-05 SDOH — ECONOMIC STABILITY: FOOD INSECURITY: WITHIN THE PAST 12 MONTHS, THE FOOD YOU BOUGHT JUST DIDN'T LAST AND YOU DIDN'T HAVE MONEY TO GET MORE.: NEVER TRUE

## 2025-09-05 SDOH — ECONOMIC STABILITY: HOUSING INSECURITY: IN THE LAST 12 MONTHS, WAS THERE A TIME WHEN YOU WERE NOT ABLE TO PAY THE MORTGAGE OR RENT ON TIME?: NO

## 2025-09-05 SDOH — SOCIAL STABILITY: SOCIAL INSECURITY
WITHIN THE LAST YEAR, HAVE YOU BEEN RAPED OR FORCED TO HAVE ANY KIND OF SEXUAL ACTIVITY BY YOUR PARTNER OR EX-PARTNER?: PATIENT DECLINED

## 2025-09-05 SDOH — SOCIAL STABILITY: SOCIAL INSECURITY: ARE THERE ANY APPARENT SIGNS OF INJURIES/BEHAVIORS THAT COULD BE RELATED TO ABUSE/NEGLECT?: NO

## 2025-09-05 SDOH — ECONOMIC STABILITY: HOUSING INSECURITY: IN THE PAST 12 MONTHS, HOW MANY TIMES HAVE YOU MOVED WHERE YOU WERE LIVING?: 0

## 2025-09-05 SDOH — SOCIAL STABILITY: SOCIAL INSECURITY: HAVE YOU HAD THOUGHTS OF HARMING ANYONE ELSE?: NO

## 2025-09-05 SDOH — SOCIAL STABILITY: SOCIAL INSECURITY
WITHIN THE LAST YEAR, HAVE YOU BEEN HUMILIATED OR EMOTIONALLY ABUSED IN OTHER WAYS BY YOUR PARTNER OR EX-PARTNER?: PATIENT DECLINED

## 2025-09-05 SDOH — SOCIAL STABILITY: SOCIAL INSECURITY: DOES ANYONE TRY TO KEEP YOU FROM HAVING/CONTACTING OTHER FRIENDS OR DOING THINGS OUTSIDE YOUR HOME?: NO

## 2025-09-05 SDOH — SOCIAL STABILITY: SOCIAL INSECURITY
WITHIN THE LAST YEAR, HAVE YOU BEEN KICKED, HIT, SLAPPED, OR OTHERWISE PHYSICALLY HURT BY YOUR PARTNER OR EX-PARTNER?: PATIENT DECLINED

## 2025-09-05 SDOH — SOCIAL STABILITY: SOCIAL INSECURITY: HAS ANYONE EVER THREATENED TO HURT YOUR FAMILY OR YOUR PETS?: NO

## 2025-09-05 SDOH — SOCIAL STABILITY: SOCIAL INSECURITY: DO YOU FEEL ANYONE HAS EXPLOITED OR TAKEN ADVANTAGE OF YOU FINANCIALLY OR OF YOUR PERSONAL PROPERTY?: NO

## 2025-09-05 SDOH — SOCIAL STABILITY: SOCIAL INSECURITY: DO YOU FEEL UNSAFE GOING BACK TO THE PLACE WHERE YOU ARE LIVING?: NO

## 2025-09-05 SDOH — SOCIAL STABILITY: SOCIAL INSECURITY: ARE YOU OR HAVE YOU BEEN THREATENED OR ABUSED PHYSICALLY, EMOTIONALLY, OR SEXUALLY BY ANYONE?: NO

## 2025-09-05 SDOH — SOCIAL STABILITY: SOCIAL INSECURITY: WITHIN THE LAST YEAR, HAVE YOU BEEN AFRAID OF YOUR PARTNER OR EX-PARTNER?: PATIENT DECLINED

## 2025-09-05 SDOH — SOCIAL STABILITY: SOCIAL INSECURITY: WERE YOU ABLE TO COMPLETE ALL THE BEHAVIORAL HEALTH SCREENINGS?: YES

## 2025-09-05 SDOH — SOCIAL STABILITY: SOCIAL INSECURITY: ABUSE: ADULT

## 2025-09-05 SDOH — ECONOMIC STABILITY: TRANSPORTATION INSECURITY: IN THE PAST 12 MONTHS, HAS LACK OF TRANSPORTATION KEPT YOU FROM MEDICAL APPOINTMENTS OR FROM GETTING MEDICATIONS?: NO

## 2025-09-05 ASSESSMENT — ENCOUNTER SYMPTOMS
DIARRHEA: 1
ABDOMINAL DISTENTION: 1
VOMITING: 0
RESPIRATORY NEGATIVE: 1
NAUSEA: 0
CARDIOVASCULAR NEGATIVE: 1
APPETITE CHANGE: 0
CHILLS: 0
FEVER: 0

## 2025-09-05 ASSESSMENT — ACTIVITIES OF DAILY LIVING (ADL)
PATIENT'S MEMORY ADEQUATE TO SAFELY COMPLETE DAILY ACTIVITIES?: YES
LACK_OF_TRANSPORTATION: NO
BATHING_ASSISTANCE: MAXIMAL
TOILETING: NEEDS ASSISTANCE
ADEQUATE_TO_COMPLETE_ADL: YES
FEEDING YOURSELF: INDEPENDENT
BATHING: NEEDS ASSISTANCE
WALKS IN HOME: NEEDS ASSISTANCE
LACK_OF_TRANSPORTATION: NO
ADL_ASSISTANCE: INDEPENDENT
GROOMING: NEEDS ASSISTANCE
ASSISTIVE_DEVICE: WALKER
JUDGMENT_ADEQUATE_SAFELY_COMPLETE_DAILY_ACTIVITIES: YES
DRESSING YOURSELF: NEEDS ASSISTANCE
LACK_OF_TRANSPORTATION: NO
HEARING - RIGHT EAR: FUNCTIONAL
HEARING - LEFT EAR: FUNCTIONAL

## 2025-09-05 ASSESSMENT — COGNITIVE AND FUNCTIONAL STATUS - GENERAL
DAILY ACTIVITIY SCORE: 16
MOVING TO AND FROM BED TO CHAIR: TOTAL
HELP NEEDED FOR BATHING: A LOT
WALKING IN HOSPITAL ROOM: A LOT
MOVING TO AND FROM BED TO CHAIR: A LOT
DRESSING REGULAR LOWER BODY CLOTHING: A LOT
MOBILITY SCORE: 12
TOILETING: A LOT
CLIMB 3 TO 5 STEPS WITH RAILING: TOTAL
TOILETING: A LOT
MOBILITY SCORE: 12
DRESSING REGULAR UPPER BODY CLOTHING: A LITTLE
MOVING FROM LYING ON BACK TO SITTING ON SIDE OF FLAT BED WITH BEDRAILS: A LITTLE
PERSONAL GROOMING: A LITTLE
DRESSING REGULAR LOWER BODY CLOTHING: TOTAL
STANDING UP FROM CHAIR USING ARMS: A LOT
STANDING UP FROM CHAIR USING ARMS: A LOT
STANDING UP FROM CHAIR USING ARMS: A LITTLE
MOVING TO AND FROM BED TO CHAIR: A LOT
MOBILITY SCORE: 10
HELP NEEDED FOR BATHING: A LOT
MOVING FROM LYING ON BACK TO SITTING ON SIDE OF FLAT BED WITH BEDRAILS: TOTAL
TOILETING: TOTAL
PERSONAL GROOMING: A LITTLE
DAILY ACTIVITIY SCORE: 17
TURNING FROM BACK TO SIDE WHILE IN FLAT BAD: TOTAL
DRESSING REGULAR UPPER BODY CLOTHING: A LITTLE
PERSONAL GROOMING: A LITTLE
HELP NEEDED FOR BATHING: A LOT
CLIMB 3 TO 5 STEPS WITH RAILING: TOTAL
CLIMB 3 TO 5 STEPS WITH RAILING: TOTAL
PATIENT BASELINE BEDBOUND: NO
MOVING FROM LYING ON BACK TO SITTING ON SIDE OF FLAT BED WITH BEDRAILS: A LITTLE
WALKING IN HOSPITAL ROOM: TOTAL
TURNING FROM BACK TO SIDE WHILE IN FLAT BAD: A LITTLE
TURNING FROM BACK TO SIDE WHILE IN FLAT BAD: A LOT
DRESSING REGULAR LOWER BODY CLOTHING: A LITTLE
WALKING IN HOSPITAL ROOM: A LITTLE
DRESSING REGULAR UPPER BODY CLOTHING: A LITTLE
DAILY ACTIVITIY SCORE: 14

## 2025-09-05 ASSESSMENT — LIFESTYLE VARIABLES
HOW MANY STANDARD DRINKS CONTAINING ALCOHOL DO YOU HAVE ON A TYPICAL DAY: PATIENT DOES NOT DRINK
SKIP TO QUESTIONS 9-10: 1
AUDIT-C TOTAL SCORE: 0
AUDIT-C TOTAL SCORE: 0
HOW OFTEN DO YOU HAVE 6 OR MORE DRINKS ON ONE OCCASION: NEVER
HOW OFTEN DO YOU HAVE A DRINK CONTAINING ALCOHOL: NEVER

## 2025-09-05 ASSESSMENT — PAIN SCALES - GENERAL
PAINLEVEL_OUTOF10: 5 - MODERATE PAIN
PAINLEVEL_OUTOF10: 0 - NO PAIN
PAINLEVEL_OUTOF10: 2
PAINLEVEL_OUTOF10: 0 - NO PAIN
PAINLEVEL_OUTOF10: 5 - MODERATE PAIN

## 2025-09-05 ASSESSMENT — PAIN - FUNCTIONAL ASSESSMENT
PAIN_FUNCTIONAL_ASSESSMENT: 0-10

## 2025-09-05 ASSESSMENT — PATIENT HEALTH QUESTIONNAIRE - PHQ9
2. FEELING DOWN, DEPRESSED OR HOPELESS: NOT AT ALL
1. LITTLE INTEREST OR PLEASURE IN DOING THINGS: NOT AT ALL
SUM OF ALL RESPONSES TO PHQ9 QUESTIONS 1 & 2: 0

## 2025-09-06 LAB
ANION GAP SERPL CALC-SCNC: 12 MMOL/L (ref 10–20)
BASOPHILS # BLD MANUAL: 0 X10*3/UL (ref 0–0.1)
BASOPHILS NFR BLD MANUAL: 0 %
BUN SERPL-MCNC: 49 MG/DL (ref 6–23)
C COLI+JEJ+UPSA DNA STL QL NAA+PROBE: NOT DETECTED
CALCIUM SERPL-MCNC: 8.1 MG/DL (ref 8.6–10.3)
CHLORIDE SERPL-SCNC: 103 MMOL/L (ref 98–107)
CO2 SERPL-SCNC: 21 MMOL/L (ref 21–32)
CREAT SERPL-MCNC: 1.52 MG/DL (ref 0.5–1.05)
EC STX1 GENE STL QL NAA+PROBE: NOT DETECTED
EC STX2 GENE STL QL NAA+PROBE: NOT DETECTED
EGFRCR SERPLBLD CKD-EPI 2021: 35 ML/MIN/1.73M*2
EOSINOPHIL # BLD MANUAL: 0.1 X10*3/UL (ref 0–0.4)
EOSINOPHIL NFR BLD MANUAL: 1 %
ERYTHROCYTE [DISTWIDTH] IN BLOOD BY AUTOMATED COUNT: 13.7 % (ref 11.5–14.5)
GIANT PLATELETS BLD QL SMEAR: ABNORMAL
GLUCOSE SERPL-MCNC: 132 MG/DL (ref 74–99)
HCT VFR BLD AUTO: 29.8 % (ref 36–46)
HGB BLD-MCNC: 9.7 G/DL (ref 12–16)
IMM GRANULOCYTES # BLD AUTO: 0.69 X10*3/UL (ref 0–0.5)
IMM GRANULOCYTES NFR BLD AUTO: 6.8 % (ref 0–0.9)
LYMPHOCYTES # BLD MANUAL: 1.52 X10*3/UL (ref 0.8–3)
LYMPHOCYTES NFR BLD MANUAL: 15 %
MCH RBC QN AUTO: 28.7 PG (ref 26–34)
MCHC RBC AUTO-ENTMCNC: 32.6 G/DL (ref 32–36)
MCV RBC AUTO: 88 FL (ref 80–100)
MONOCYTES # BLD MANUAL: 1.01 X10*3/UL (ref 0.05–0.8)
MONOCYTES NFR BLD MANUAL: 10 %
NEUTROPHILS # BLD MANUAL: 7.37 X10*3/UL (ref 1.6–5.5)
NEUTS BAND # BLD MANUAL: 0.3 X10*3/UL (ref 0–0.5)
NEUTS BAND NFR BLD MANUAL: 3 %
NEUTS SEG # BLD MANUAL: 7.07 X10*3/UL (ref 1.6–5)
NEUTS SEG NFR BLD MANUAL: 70 %
NOROVIRUS GI + GII RNA STL NAA+PROBE: NOT DETECTED
NRBC BLD-RTO: 0 /100 WBCS (ref 0–0)
PLATELET # BLD AUTO: 463 X10*3/UL (ref 150–450)
POLYCHROMASIA BLD QL SMEAR: ABNORMAL
POTASSIUM SERPL-SCNC: 3.4 MMOL/L (ref 3.5–5.3)
RBC # BLD AUTO: 3.38 X10*6/UL (ref 4–5.2)
RBC MORPH BLD: ABNORMAL
RV RNA STL NAA+PROBE: NOT DETECTED
SALMONELLA DNA STL QL NAA+PROBE: NOT DETECTED
SCHISTOCYTES BLD QL SMEAR: ABNORMAL
SHIGELLA DNA SPEC QL NAA+PROBE: NOT DETECTED
SODIUM SERPL-SCNC: 133 MMOL/L (ref 136–145)
TOTAL CELLS COUNTED BLD: 100
V CHOLERAE DNA STL QL NAA+PROBE: NOT DETECTED
VARIANT LYMPHS # BLD MANUAL: 0.1 X10*3/UL (ref 0–0.3)
VARIANT LYMPHS NFR BLD: 1 %
WBC # BLD AUTO: 10.1 X10*3/UL (ref 4.4–11.3)
Y ENTEROCOL DNA STL QL NAA+PROBE: NOT DETECTED

## 2025-09-06 PROCEDURE — 85027 COMPLETE CBC AUTOMATED: CPT | Performed by: INTERNAL MEDICINE

## 2025-09-06 PROCEDURE — 2500000004 HC RX 250 GENERAL PHARMACY W/ HCPCS (ALT 636 FOR OP/ED): Performed by: INTERNAL MEDICINE

## 2025-09-06 PROCEDURE — 80048 BASIC METABOLIC PNL TOTAL CA: CPT | Performed by: HOSPITALIST

## 2025-09-06 PROCEDURE — 2500000001 HC RX 250 WO HCPCS SELF ADMINISTERED DRUGS (ALT 637 FOR MEDICARE OP): Performed by: INTERNAL MEDICINE

## 2025-09-06 PROCEDURE — 99232 SBSQ HOSP IP/OBS MODERATE 35: CPT | Performed by: SURGERY

## 2025-09-06 PROCEDURE — 51701 INSERT BLADDER CATHETER: CPT

## 2025-09-06 PROCEDURE — 36415 COLL VENOUS BLD VENIPUNCTURE: CPT | Performed by: HOSPITALIST

## 2025-09-06 PROCEDURE — 99233 SBSQ HOSP IP/OBS HIGH 50: CPT | Performed by: HOSPITALIST

## 2025-09-06 PROCEDURE — 2500000002 HC RX 250 W HCPCS SELF ADMINISTERED DRUGS (ALT 637 FOR MEDICARE OP, ALT 636 FOR OP/ED): Performed by: INTERNAL MEDICINE

## 2025-09-06 PROCEDURE — 1100000001 HC PRIVATE ROOM DAILY

## 2025-09-06 PROCEDURE — 2500000002 HC RX 250 W HCPCS SELF ADMINISTERED DRUGS (ALT 637 FOR MEDICARE OP, ALT 636 FOR OP/ED): Performed by: HOSPITALIST

## 2025-09-06 PROCEDURE — 97110 THERAPEUTIC EXERCISES: CPT | Mod: GP

## 2025-09-06 PROCEDURE — 85007 BL SMEAR W/DIFF WBC COUNT: CPT | Performed by: INTERNAL MEDICINE

## 2025-09-06 RX ORDER — POTASSIUM CHLORIDE 20 MEQ/1
40 TABLET, EXTENDED RELEASE ORAL ONCE
Status: COMPLETED | OUTPATIENT
Start: 2025-09-06 | End: 2025-09-06

## 2025-09-06 RX ADMIN — SERTRALINE HYDROCHLORIDE 50 MG: 50 TABLET ORAL at 08:15

## 2025-09-06 RX ADMIN — POTASSIUM CHLORIDE 40 MEQ: 1500 TABLET, EXTENDED RELEASE ORAL at 09:42

## 2025-09-06 RX ADMIN — METOPROLOL TARTRATE 25 MG: 25 TABLET, FILM COATED ORAL at 08:15

## 2025-09-06 RX ADMIN — ROSUVASTATIN CALCIUM 10 MG: 5 TABLET, FILM COATED ORAL at 08:15

## 2025-09-06 RX ADMIN — ASPIRIN 81 MG: 81 TABLET, DELAYED RELEASE ORAL at 08:15

## 2025-09-06 RX ADMIN — METOPROLOL TARTRATE 25 MG: 25 TABLET, FILM COATED ORAL at 21:07

## 2025-09-06 RX ADMIN — HEPARIN SODIUM 5000 UNITS: 5000 INJECTION, SOLUTION INTRAVENOUS; SUBCUTANEOUS at 14:26

## 2025-09-06 RX ADMIN — METRONIDAZOLE 500 MG: 500 INJECTION, SOLUTION INTRAVENOUS at 23:52

## 2025-09-06 RX ADMIN — METRONIDAZOLE 500 MG: 500 INJECTION, SOLUTION INTRAVENOUS at 08:15

## 2025-09-06 RX ADMIN — ASPIRIN 81 MG: 81 TABLET, DELAYED RELEASE ORAL at 21:07

## 2025-09-06 RX ADMIN — METRONIDAZOLE 500 MG: 500 INJECTION, SOLUTION INTRAVENOUS at 16:09

## 2025-09-06 RX ADMIN — Medication 1 CAPSULE: at 08:15

## 2025-09-06 RX ADMIN — PANTOPRAZOLE SODIUM 40 MG: 40 TABLET, DELAYED RELEASE ORAL at 06:21

## 2025-09-06 RX ADMIN — HEPARIN SODIUM 5000 UNITS: 5000 INJECTION, SOLUTION INTRAVENOUS; SUBCUTANEOUS at 06:21

## 2025-09-06 RX ADMIN — CIPROFLOXACIN 400 MG: 2 INJECTION, SOLUTION INTRAVENOUS at 03:36

## 2025-09-06 RX ADMIN — HEPARIN SODIUM 5000 UNITS: 5000 INJECTION, SOLUTION INTRAVENOUS; SUBCUTANEOUS at 21:07

## 2025-09-06 RX ADMIN — CIPROFLOXACIN 400 MG: 2 INJECTION, SOLUTION INTRAVENOUS at 14:26

## 2025-09-06 ASSESSMENT — COGNITIVE AND FUNCTIONAL STATUS - GENERAL
MOBILITY SCORE: 12
CLIMB 3 TO 5 STEPS WITH RAILING: TOTAL
MOVING FROM LYING ON BACK TO SITTING ON SIDE OF FLAT BED WITH BEDRAILS: A LOT
STANDING UP FROM CHAIR USING ARMS: A LITTLE
MOBILITY SCORE: 14
TOILETING: A LOT
DAILY ACTIVITIY SCORE: 16
TURNING FROM BACK TO SIDE WHILE IN FLAT BAD: A LITTLE
MOVING TO AND FROM BED TO CHAIR: A LOT
WALKING IN HOSPITAL ROOM: A LOT
TURNING FROM BACK TO SIDE WHILE IN FLAT BAD: A LOT
DRESSING REGULAR LOWER BODY CLOTHING: A LOT
DAILY ACTIVITIY SCORE: 16
MOVING TO AND FROM BED TO CHAIR: A LOT
STANDING UP FROM CHAIR USING ARMS: A LOT
DRESSING REGULAR LOWER BODY CLOTHING: A LOT
HELP NEEDED FOR BATHING: A LOT
PERSONAL GROOMING: A LITTLE
WALKING IN HOSPITAL ROOM: A LOT
DRESSING REGULAR UPPER BODY CLOTHING: A LITTLE
TURNING FROM BACK TO SIDE WHILE IN FLAT BAD: A LITTLE
STANDING UP FROM CHAIR USING ARMS: A LOT
MOVING TO AND FROM BED TO CHAIR: A LOT
PERSONAL GROOMING: A LITTLE
DRESSING REGULAR UPPER BODY CLOTHING: A LITTLE
TOILETING: A LOT
STANDING UP FROM CHAIR USING ARMS: A LOT
MOBILITY SCORE: 14
CLIMB 3 TO 5 STEPS WITH RAILING: TOTAL
WALKING IN HOSPITAL ROOM: A LITTLE
HELP NEEDED FOR BATHING: A LOT
MOBILITY SCORE: 13
WALKING IN HOSPITAL ROOM: A LOT
MOVING TO AND FROM BED TO CHAIR: A LOT
TURNING FROM BACK TO SIDE WHILE IN FLAT BAD: A LOT
MOVING FROM LYING ON BACK TO SITTING ON SIDE OF FLAT BED WITH BEDRAILS: A LITTLE

## 2025-09-06 ASSESSMENT — PAIN SCALES - GENERAL
PAINLEVEL_OUTOF10: 0 - NO PAIN
PAINLEVEL_OUTOF10: 2

## 2025-09-06 ASSESSMENT — PAIN - FUNCTIONAL ASSESSMENT
PAIN_FUNCTIONAL_ASSESSMENT: 0-10
PAIN_FUNCTIONAL_ASSESSMENT: 0-10

## 2025-09-07 VITALS
OXYGEN SATURATION: 99 % | TEMPERATURE: 98.1 F | RESPIRATION RATE: 16 BRPM | SYSTOLIC BLOOD PRESSURE: 116 MMHG | HEART RATE: 64 BPM | BODY MASS INDEX: 38.65 KG/M2 | DIASTOLIC BLOOD PRESSURE: 81 MMHG | WEIGHT: 196.87 LBS | HEIGHT: 60 IN

## 2025-09-07 LAB
AMORPH CRY #/AREA UR COMP ASSIST: ABNORMAL /HPF
ANION GAP SERPL CALC-SCNC: 12 MMOL/L (ref 10–20)
APPEARANCE UR: CLEAR
BILIRUB UR STRIP.AUTO-MCNC: NEGATIVE MG/DL
BUN SERPL-MCNC: 40 MG/DL (ref 6–23)
CALCIUM SERPL-MCNC: 8.2 MG/DL (ref 8.6–10.3)
CHLORIDE SERPL-SCNC: 108 MMOL/L (ref 98–107)
CO2 SERPL-SCNC: 21 MMOL/L (ref 21–32)
COLOR UR: ABNORMAL
CREAT SERPL-MCNC: 1.5 MG/DL (ref 0.5–1.05)
EGFRCR SERPLBLD CKD-EPI 2021: 35 ML/MIN/1.73M*2
ERYTHROCYTE [DISTWIDTH] IN BLOOD BY AUTOMATED COUNT: 13.8 % (ref 11.5–14.5)
GLUCOSE SERPL-MCNC: 96 MG/DL (ref 74–99)
GLUCOSE UR STRIP.AUTO-MCNC: NORMAL MG/DL
HCT VFR BLD AUTO: 29.5 % (ref 36–46)
HGB BLD-MCNC: 9.3 G/DL (ref 12–16)
KETONES UR STRIP.AUTO-MCNC: NEGATIVE MG/DL
LEUKOCYTE ESTERASE UR QL STRIP.AUTO: ABNORMAL
MCH RBC QN AUTO: 28.7 PG (ref 26–34)
MCHC RBC AUTO-ENTMCNC: 31.5 G/DL (ref 32–36)
MCV RBC AUTO: 91 FL (ref 80–100)
MUCOUS THREADS #/AREA URNS AUTO: ABNORMAL /LPF
NITRITE UR QL STRIP.AUTO: NEGATIVE
NRBC BLD-RTO: 0 /100 WBCS (ref 0–0)
PH UR STRIP.AUTO: 5.5 [PH]
PLATELET # BLD AUTO: 496 X10*3/UL (ref 150–450)
POTASSIUM SERPL-SCNC: 3.6 MMOL/L (ref 3.5–5.3)
PROT UR STRIP.AUTO-MCNC: NEGATIVE MG/DL
RBC # BLD AUTO: 3.24 X10*6/UL (ref 4–5.2)
RBC # UR STRIP.AUTO: NEGATIVE MG/DL
RBC #/AREA URNS AUTO: ABNORMAL /HPF
SODIUM SERPL-SCNC: 137 MMOL/L (ref 136–145)
SP GR UR STRIP.AUTO: 1.02
SQUAMOUS #/AREA URNS AUTO: ABNORMAL /HPF
UROBILINOGEN UR STRIP.AUTO-MCNC: NORMAL MG/DL
WBC # BLD AUTO: 10.5 X10*3/UL (ref 4.4–11.3)
WBC #/AREA URNS AUTO: ABNORMAL /HPF
YEAST BUDDING #/AREA UR COMP ASSIST: PRESENT /HPF

## 2025-09-07 PROCEDURE — 2500000002 HC RX 250 W HCPCS SELF ADMINISTERED DRUGS (ALT 637 FOR MEDICARE OP, ALT 636 FOR OP/ED): Performed by: INTERNAL MEDICINE

## 2025-09-07 PROCEDURE — 1100000001 HC PRIVATE ROOM DAILY

## 2025-09-07 PROCEDURE — 36415 COLL VENOUS BLD VENIPUNCTURE: CPT | Performed by: HOSPITALIST

## 2025-09-07 PROCEDURE — 87086 URINE CULTURE/COLONY COUNT: CPT | Mod: SAMLAB | Performed by: HOSPITALIST

## 2025-09-07 PROCEDURE — 80048 BASIC METABOLIC PNL TOTAL CA: CPT | Performed by: HOSPITALIST

## 2025-09-07 PROCEDURE — 85027 COMPLETE CBC AUTOMATED: CPT | Performed by: HOSPITALIST

## 2025-09-07 PROCEDURE — 2500000004 HC RX 250 GENERAL PHARMACY W/ HCPCS (ALT 636 FOR OP/ED): Performed by: INTERNAL MEDICINE

## 2025-09-07 PROCEDURE — 81001 URINALYSIS AUTO W/SCOPE: CPT | Performed by: HOSPITALIST

## 2025-09-07 PROCEDURE — 97116 GAIT TRAINING THERAPY: CPT | Mod: GP,CQ | Performed by: PHYSICAL THERAPY ASSISTANT

## 2025-09-07 PROCEDURE — 51701 INSERT BLADDER CATHETER: CPT

## 2025-09-07 PROCEDURE — 2500000001 HC RX 250 WO HCPCS SELF ADMINISTERED DRUGS (ALT 637 FOR MEDICARE OP): Performed by: INTERNAL MEDICINE

## 2025-09-07 RX ADMIN — ASPIRIN 81 MG: 81 TABLET, DELAYED RELEASE ORAL at 09:37

## 2025-09-07 RX ADMIN — METOPROLOL TARTRATE 25 MG: 25 TABLET, FILM COATED ORAL at 09:37

## 2025-09-07 RX ADMIN — PANTOPRAZOLE SODIUM 40 MG: 40 TABLET, DELAYED RELEASE ORAL at 06:40

## 2025-09-07 RX ADMIN — ACETAMINOPHEN 650 MG: 325 TABLET ORAL at 22:01

## 2025-09-07 RX ADMIN — HEPARIN SODIUM 5000 UNITS: 5000 INJECTION, SOLUTION INTRAVENOUS; SUBCUTANEOUS at 20:03

## 2025-09-07 RX ADMIN — Medication 1 CAPSULE: at 09:37

## 2025-09-07 RX ADMIN — METRONIDAZOLE 500 MG: 500 INJECTION, SOLUTION INTRAVENOUS at 16:58

## 2025-09-07 RX ADMIN — CIPROFLOXACIN 400 MG: 2 INJECTION, SOLUTION INTRAVENOUS at 01:06

## 2025-09-07 RX ADMIN — ROSUVASTATIN CALCIUM 10 MG: 5 TABLET, FILM COATED ORAL at 09:36

## 2025-09-07 RX ADMIN — METRONIDAZOLE 500 MG: 500 INJECTION, SOLUTION INTRAVENOUS at 07:52

## 2025-09-07 RX ADMIN — ASPIRIN 81 MG: 81 TABLET, DELAYED RELEASE ORAL at 20:03

## 2025-09-07 RX ADMIN — SERTRALINE HYDROCHLORIDE 50 MG: 50 TABLET ORAL at 09:37

## 2025-09-07 RX ADMIN — METOPROLOL TARTRATE 25 MG: 25 TABLET, FILM COATED ORAL at 20:03

## 2025-09-07 RX ADMIN — CIPROFLOXACIN 400 MG: 2 INJECTION, SOLUTION INTRAVENOUS at 13:39

## 2025-09-07 RX ADMIN — HEPARIN SODIUM 5000 UNITS: 5000 INJECTION, SOLUTION INTRAVENOUS; SUBCUTANEOUS at 13:39

## 2025-09-07 ASSESSMENT — PAIN - FUNCTIONAL ASSESSMENT
PAIN_FUNCTIONAL_ASSESSMENT: 0-10
PAIN_FUNCTIONAL_ASSESSMENT: 0-10

## 2025-09-07 ASSESSMENT — COGNITIVE AND FUNCTIONAL STATUS - GENERAL
MOVING TO AND FROM BED TO CHAIR: A LOT
HELP NEEDED FOR BATHING: A LOT
HELP NEEDED FOR BATHING: A LOT
DRESSING REGULAR LOWER BODY CLOTHING: A LOT
WALKING IN HOSPITAL ROOM: A LITTLE
CLIMB 3 TO 5 STEPS WITH RAILING: TOTAL
DAILY ACTIVITIY SCORE: 16
DRESSING REGULAR LOWER BODY CLOTHING: A LOT
WALKING IN HOSPITAL ROOM: A LOT
STANDING UP FROM CHAIR USING ARMS: A LOT
MOVING FROM LYING ON BACK TO SITTING ON SIDE OF FLAT BED WITH BEDRAILS: A LOT
TURNING FROM BACK TO SIDE WHILE IN FLAT BAD: A LITTLE
DRESSING REGULAR UPPER BODY CLOTHING: A LITTLE
TURNING FROM BACK TO SIDE WHILE IN FLAT BAD: A LITTLE
CLIMB 3 TO 5 STEPS WITH RAILING: TOTAL
TOILETING: A LOT
WALKING IN HOSPITAL ROOM: A LOT
STANDING UP FROM CHAIR USING ARMS: A LOT
MOBILITY SCORE: 13
MOVING FROM LYING ON BACK TO SITTING ON SIDE OF FLAT BED WITH BEDRAILS: A LITTLE
MOBILITY SCORE: 14
MOVING TO AND FROM BED TO CHAIR: A LITTLE
STANDING UP FROM CHAIR USING ARMS: A LITTLE
MOVING TO AND FROM BED TO CHAIR: A LOT
PERSONAL GROOMING: A LITTLE
DAILY ACTIVITIY SCORE: 16
PERSONAL GROOMING: A LITTLE
MOBILITY SCORE: 14
CLIMB 3 TO 5 STEPS WITH RAILING: TOTAL
TURNING FROM BACK TO SIDE WHILE IN FLAT BAD: A LOT
DRESSING REGULAR UPPER BODY CLOTHING: A LITTLE
TOILETING: A LOT

## 2025-09-07 ASSESSMENT — PAIN SCALES - GENERAL
PAINLEVEL_OUTOF10: 4
PAINLEVEL_OUTOF10: 0 - NO PAIN

## 2025-09-10 ENCOUNTER — APPOINTMENT (OUTPATIENT)
Dept: ORTHOPEDIC SURGERY | Facility: CLINIC | Age: 80
End: 2025-09-10
Payer: MEDICARE

## 2025-09-15 ENCOUNTER — APPOINTMENT (OUTPATIENT)
Dept: NEPHROLOGY | Facility: CLINIC | Age: 80
End: 2025-09-15
Payer: MEDICARE

## 2025-12-04 ENCOUNTER — APPOINTMENT (OUTPATIENT)
Dept: ORTHOPEDIC SURGERY | Facility: CLINIC | Age: 80
End: 2025-12-04
Payer: MEDICARE